# Patient Record
Sex: FEMALE | Race: WHITE | Employment: OTHER | URBAN - METROPOLITAN AREA
[De-identification: names, ages, dates, MRNs, and addresses within clinical notes are randomized per-mention and may not be internally consistent; named-entity substitution may affect disease eponyms.]

---

## 2017-01-17 ENCOUNTER — ALLSCRIPTS OFFICE VISIT (OUTPATIENT)
Dept: OTHER | Facility: OTHER | Age: 77
End: 2017-01-17

## 2017-01-17 ENCOUNTER — LAB REQUISITION (OUTPATIENT)
Dept: LAB | Facility: HOSPITAL | Age: 77
End: 2017-01-17
Payer: COMMERCIAL

## 2017-01-17 DIAGNOSIS — N39.0 URINARY TRACT INFECTION: ICD-10-CM

## 2017-01-17 DIAGNOSIS — R10.13 EPIGASTRIC PAIN: ICD-10-CM

## 2017-01-17 LAB
BILIRUB UR QL STRIP: NEGATIVE
BILIRUB UR QL STRIP: NORMAL
CLARITY UR: CLEAR
CLARITY UR: NORMAL
COLOR UR: YELLOW
COLOR UR: YELLOW
GLUCOSE (HISTORICAL): NORMAL
GLUCOSE UR STRIP-MCNC: NEGATIVE MG/DL
HGB UR QL STRIP.AUTO: NEGATIVE
HGB UR QL STRIP.AUTO: NORMAL
KETONES UR STRIP-MCNC: NEGATIVE MG/DL
KETONES UR STRIP-MCNC: NORMAL MG/DL
LEUKOCYTE ESTERASE UR QL STRIP: 1
LEUKOCYTE ESTERASE UR QL STRIP: NEGATIVE
NITRITE UR QL STRIP: NEGATIVE
NITRITE UR QL STRIP: NORMAL
PH UR STRIP.AUTO: 6 [PH]
PH UR STRIP.AUTO: 6 [PH] (ref 4.5–8)
PROT UR STRIP-MCNC: NEGATIVE MG/DL
PROT UR STRIP-MCNC: NORMAL MG/DL
SP GR UR STRIP.AUTO: 1.01
SP GR UR STRIP.AUTO: 1.01 (ref 1–1.03)
UROBILINOGEN UR QL STRIP.AUTO: 0.2 E.U./DL
UROBILINOGEN UR QL STRIP.AUTO: 3.5

## 2017-01-17 PROCEDURE — 81003 URINALYSIS AUTO W/O SCOPE: CPT | Performed by: NURSE PRACTITIONER

## 2017-01-18 ENCOUNTER — GENERIC CONVERSION - ENCOUNTER (OUTPATIENT)
Dept: OTHER | Facility: OTHER | Age: 77
End: 2017-01-18

## 2017-01-19 ENCOUNTER — APPOINTMENT (OUTPATIENT)
Dept: LAB | Age: 77
End: 2017-01-19
Payer: COMMERCIAL

## 2017-01-19 ENCOUNTER — TRANSCRIBE ORDERS (OUTPATIENT)
Dept: ADMINISTRATIVE | Age: 77
End: 2017-01-19

## 2017-01-19 DIAGNOSIS — R10.13 EPIGASTRIC PAIN: ICD-10-CM

## 2017-01-19 PROCEDURE — 87338 HPYLORI STOOL AG IA: CPT

## 2017-01-20 LAB — H PYLORI AG STL QL IA: NEGATIVE

## 2017-01-22 ENCOUNTER — GENERIC CONVERSION - ENCOUNTER (OUTPATIENT)
Dept: OTHER | Facility: OTHER | Age: 77
End: 2017-01-22

## 2017-02-17 ENCOUNTER — ALLSCRIPTS OFFICE VISIT (OUTPATIENT)
Dept: OTHER | Facility: OTHER | Age: 77
End: 2017-02-17

## 2017-02-27 ENCOUNTER — ALLSCRIPTS OFFICE VISIT (OUTPATIENT)
Dept: OTHER | Facility: OTHER | Age: 77
End: 2017-02-27

## 2017-05-01 ENCOUNTER — ANESTHESIA EVENT (OUTPATIENT)
Dept: GASTROENTEROLOGY | Facility: HOSPITAL | Age: 77
End: 2017-05-01
Payer: COMMERCIAL

## 2017-05-01 ENCOUNTER — GENERIC CONVERSION - ENCOUNTER (OUTPATIENT)
Dept: OTHER | Facility: OTHER | Age: 77
End: 2017-05-01

## 2017-05-01 ENCOUNTER — ANESTHESIA (OUTPATIENT)
Dept: GASTROENTEROLOGY | Facility: HOSPITAL | Age: 77
End: 2017-05-01
Payer: COMMERCIAL

## 2017-05-01 ENCOUNTER — HOSPITAL ENCOUNTER (OUTPATIENT)
Facility: HOSPITAL | Age: 77
Setting detail: OUTPATIENT SURGERY
Discharge: HOME | End: 2017-05-01
Attending: INTERNAL MEDICINE | Admitting: INTERNAL MEDICINE
Payer: COMMERCIAL

## 2017-05-01 VITALS
RESPIRATION RATE: 16 BRPM | DIASTOLIC BLOOD PRESSURE: 53 MMHG | HEART RATE: 55 BPM | OXYGEN SATURATION: 98 % | HEIGHT: 63 IN | SYSTOLIC BLOOD PRESSURE: 106 MMHG | WEIGHT: 116.84 LBS | BODY MASS INDEX: 20.7 KG/M2 | TEMPERATURE: 97.1 F

## 2017-05-01 DIAGNOSIS — Z12.11 ENCOUNTER FOR SCREENING FOR MALIGNANT NEOPLASM OF COLON: ICD-10-CM

## 2017-05-01 DIAGNOSIS — K21.9 ACID REFLUX DISEASE: ICD-10-CM

## 2017-05-01 PROCEDURE — 88342 IMHCHEM/IMCYTCHM 1ST ANTB: CPT | Performed by: INTERNAL MEDICINE

## 2017-05-01 PROCEDURE — 88305 TISSUE EXAM BY PATHOLOGIST: CPT | Performed by: INTERNAL MEDICINE

## 2017-05-01 RX ORDER — LOSARTAN POTASSIUM 50 MG/1
50 TABLET ORAL DAILY
COMMUNITY
End: 2018-07-24 | Stop reason: SDUPTHER

## 2017-05-01 RX ORDER — SODIUM CHLORIDE, SODIUM LACTATE, POTASSIUM CHLORIDE, CALCIUM CHLORIDE 600; 310; 30; 20 MG/100ML; MG/100ML; MG/100ML; MG/100ML
125 INJECTION, SOLUTION INTRAVENOUS CONTINUOUS
Status: DISCONTINUED | OUTPATIENT
Start: 2017-05-01 | End: 2017-05-01 | Stop reason: HOSPADM

## 2017-05-01 RX ORDER — PROPOFOL 10 MG/ML
INJECTION, EMULSION INTRAVENOUS AS NEEDED
Status: DISCONTINUED | OUTPATIENT
Start: 2017-05-01 | End: 2017-05-01 | Stop reason: SURG

## 2017-05-01 RX ORDER — OMEPRAZOLE 40 MG/1
40 CAPSULE, DELAYED RELEASE ORAL DAILY
COMMUNITY
End: 2018-07-24 | Stop reason: SDUPTHER

## 2017-05-01 RX ADMIN — PROPOFOL 100 MG: 10 INJECTION, EMULSION INTRAVENOUS at 14:06

## 2017-05-01 RX ADMIN — PROPOFOL 50 MG: 10 INJECTION, EMULSION INTRAVENOUS at 14:19

## 2017-05-01 RX ADMIN — PROPOFOL 100 MG: 10 INJECTION, EMULSION INTRAVENOUS at 14:00

## 2017-05-01 RX ADMIN — SODIUM CHLORIDE, SODIUM LACTATE, POTASSIUM CHLORIDE, AND CALCIUM CHLORIDE 125 ML/HR: .6; .31; .03; .02 INJECTION, SOLUTION INTRAVENOUS at 12:18

## 2017-05-01 RX ADMIN — PROPOFOL 50 MG: 10 INJECTION, EMULSION INTRAVENOUS at 14:12

## 2017-05-01 RX ADMIN — PROPOFOL 100 MG: 10 INJECTION, EMULSION INTRAVENOUS at 14:03

## 2017-05-25 ENCOUNTER — GENERIC CONVERSION - ENCOUNTER (OUTPATIENT)
Dept: OTHER | Facility: OTHER | Age: 77
End: 2017-05-25

## 2017-09-14 DIAGNOSIS — Z12.31 ENCOUNTER FOR SCREENING MAMMOGRAM FOR MALIGNANT NEOPLASM OF BREAST: ICD-10-CM

## 2017-10-05 ENCOUNTER — HOSPITAL ENCOUNTER (OUTPATIENT)
Dept: RADIOLOGY | Age: 77
Discharge: HOME | End: 2017-10-05
Payer: COMMERCIAL

## 2017-10-05 DIAGNOSIS — Z12.31 ENCOUNTER FOR SCREENING MAMMOGRAM FOR MALIGNANT NEOPLASM OF BREAST: ICD-10-CM

## 2017-10-05 PROCEDURE — G0202 SCR MAMMO BI INCL CAD: HCPCS

## 2017-10-07 ENCOUNTER — GENERIC CONVERSION - ENCOUNTER (OUTPATIENT)
Dept: OTHER | Facility: OTHER | Age: 77
End: 2017-10-07

## 2017-11-08 ENCOUNTER — HOSPITAL ENCOUNTER (OUTPATIENT)
Dept: RADIOLOGY | Age: 77
Discharge: HOME | End: 2017-11-08
Payer: COMMERCIAL

## 2017-11-08 ENCOUNTER — TRANSCRIBE ORDERS (OUTPATIENT)
Dept: ADMINISTRATIVE | Age: 77
End: 2017-11-08

## 2017-11-08 ENCOUNTER — APPOINTMENT (OUTPATIENT)
Dept: LAB | Age: 77
End: 2017-11-08
Payer: COMMERCIAL

## 2017-11-08 DIAGNOSIS — E04.1 NONTOXIC SINGLE THYROID NODULE: ICD-10-CM

## 2017-11-08 DIAGNOSIS — E78.5 HYPERLIPIDEMIA: ICD-10-CM

## 2017-11-08 DIAGNOSIS — I10 ESSENTIAL (PRIMARY) HYPERTENSION: ICD-10-CM

## 2017-11-08 LAB
ALBUMIN SERPL BCP-MCNC: 3.5 G/DL (ref 3.5–5)
ALP SERPL-CCNC: 102 U/L (ref 46–116)
ALT SERPL W P-5'-P-CCNC: 18 U/L (ref 12–78)
ANION GAP SERPL CALCULATED.3IONS-SCNC: 3 MMOL/L (ref 4–13)
AST SERPL W P-5'-P-CCNC: 15 U/L (ref 5–45)
BASOPHILS # BLD AUTO: 0.02 THOUSANDS/ΜL (ref 0–0.1)
BASOPHILS NFR BLD AUTO: 0 % (ref 0–1)
BILIRUB SERPL-MCNC: 0.34 MG/DL (ref 0.2–1)
BUN SERPL-MCNC: 16 MG/DL (ref 5–25)
CALCIUM SERPL-MCNC: 9.1 MG/DL (ref 8.3–10.1)
CHLORIDE SERPL-SCNC: 105 MMOL/L (ref 100–108)
CHOLEST SERPL-MCNC: 218 MG/DL (ref 50–200)
CO2 SERPL-SCNC: 29 MMOL/L (ref 21–32)
CREAT SERPL-MCNC: 0.75 MG/DL (ref 0.6–1.3)
EOSINOPHIL # BLD AUTO: 0.05 THOUSAND/ΜL (ref 0–0.61)
EOSINOPHIL NFR BLD AUTO: 1 % (ref 0–6)
ERYTHROCYTE [DISTWIDTH] IN BLOOD BY AUTOMATED COUNT: 13.2 % (ref 11.6–15.1)
GFR SERPL CREATININE-BSD FRML MDRD: 79 ML/MIN/1.73SQ M
GLUCOSE P FAST SERPL-MCNC: 81 MG/DL (ref 65–99)
HCT VFR BLD AUTO: 43.1 % (ref 34.8–46.1)
HDLC SERPL-MCNC: 74 MG/DL (ref 40–60)
HGB BLD-MCNC: 14.3 G/DL (ref 11.5–15.4)
LDLC SERPL CALC-MCNC: 124 MG/DL (ref 0–100)
LYMPHOCYTES # BLD AUTO: 0.75 THOUSANDS/ΜL (ref 0.6–4.47)
LYMPHOCYTES NFR BLD AUTO: 9 % (ref 14–44)
MCH RBC QN AUTO: 32.1 PG (ref 26.8–34.3)
MCHC RBC AUTO-ENTMCNC: 33.2 G/DL (ref 31.4–37.4)
MCV RBC AUTO: 97 FL (ref 82–98)
MONOCYTES # BLD AUTO: 0.38 THOUSAND/ΜL (ref 0.17–1.22)
MONOCYTES NFR BLD AUTO: 5 % (ref 4–12)
NEUTROPHILS # BLD AUTO: 6.93 THOUSANDS/ΜL (ref 1.85–7.62)
NEUTS SEG NFR BLD AUTO: 85 % (ref 43–75)
NRBC BLD AUTO-RTO: 0 /100 WBCS
PLATELET # BLD AUTO: 287 THOUSANDS/UL (ref 149–390)
PMV BLD AUTO: 10.2 FL (ref 8.9–12.7)
POTASSIUM SERPL-SCNC: 4.5 MMOL/L (ref 3.5–5.3)
PROT SERPL-MCNC: 7.1 G/DL (ref 6.4–8.2)
RBC # BLD AUTO: 4.45 MILLION/UL (ref 3.81–5.12)
SODIUM SERPL-SCNC: 137 MMOL/L (ref 136–145)
TRIGL SERPL-MCNC: 98 MG/DL
TSH SERPL DL<=0.05 MIU/L-ACNC: 1.26 UIU/ML (ref 0.36–3.74)
WBC # BLD AUTO: 8.14 THOUSAND/UL (ref 4.31–10.16)

## 2017-11-08 PROCEDURE — 80061 LIPID PANEL: CPT

## 2017-11-08 PROCEDURE — 36415 COLL VENOUS BLD VENIPUNCTURE: CPT

## 2017-11-08 PROCEDURE — 84443 ASSAY THYROID STIM HORMONE: CPT

## 2017-11-08 PROCEDURE — 85025 COMPLETE CBC W/AUTO DIFF WBC: CPT

## 2017-11-08 PROCEDURE — 76536 US EXAM OF HEAD AND NECK: CPT

## 2017-11-08 PROCEDURE — 80053 COMPREHEN METABOLIC PANEL: CPT

## 2017-11-09 ENCOUNTER — GENERIC CONVERSION - ENCOUNTER (OUTPATIENT)
Dept: OTHER | Facility: OTHER | Age: 77
End: 2017-11-09

## 2017-11-13 ENCOUNTER — GENERIC CONVERSION - ENCOUNTER (OUTPATIENT)
Dept: OTHER | Facility: OTHER | Age: 77
End: 2017-11-13

## 2017-11-21 ENCOUNTER — ALLSCRIPTS OFFICE VISIT (OUTPATIENT)
Dept: OTHER | Facility: OTHER | Age: 77
End: 2017-11-21

## 2018-01-11 NOTE — PROGRESS NOTES
Assessment    1  Encounter for preventive health examination (V70 0) (Z00 00)   2  Need for hepatitis C screening test (V73 89) (Z11 59)   3  Abnormal blood chemistry (790 6) (R79 9)    Plan  Abnormal blood chemistry    · * DXA BONE DENSITY SPINE HIP AND PELVIS; Status:Hold For - Scheduling;  Requested for:21Nov2017; Abnormal blood chemistry, Acid reflux disease, Benign essential hypertension,  Hemorrhoids, Hyperlipidemia, Long term use of drug, Need for hepatitis C  screening test, Thyroid nodule, Vitamin D deficiency    · (1) HEP C ANTIBODY; Status:Active; Requested for:21Jul2018; Abnormal blood chemistry, Acid reflux disease, Benign essential hypertension,  Hemorrhoids, Hyperlipidemia, Long term use of drug, Thyroid nodule, Vitamin D  deficiency    · (1) COMPREHENSIVE METABOLIC PANEL; Status:Active; Requested for:21Jul2018;    · (1) LIPID PANEL FASTING W DIRECT LDL REFLEX; Status:Active; Requested  for:21Jul2018;    · (1) TSH WITH FT4 REFLEX; Status:Active; Requested for:21Jul2018; Abnormal blood chemistry, Need for hepatitis C screening test    · (1) CBC/PLT/DIFF; Status:Active; Requested for:21Jul2018;   Screening for genitourinary condition    · *VB - Urinary Incontinence Screen (Dx Z13 89 Screen for UI); Status:Complete -  Retrospective By Protocol Authorization;   Done: 58MFN3259 10:16AM    Discussion/Summary    DEXA  LABS  F/U 6 MONTHS  ONE YEAR MEDICARE WELLNESS  Impression: Subsequent Annual Wellness Visit, with preventive exam as well as age and risk appropriate counseling completed  Cardiovascular screening and counseling: the risks and benefits of screening were discussed and counseling was given on maintaining a healthy diet  Diabetes screening and counseling: the risks and benefits of screening were discussed and counseling was given on maintaining a healthy diet  Colorectal cancer screening and counseling: the risks and benefits of screening were discussed and screening is current  Breast cancer screening and counseling: the risks and benefits of screening were discussed and screening is current  Cervical cancer screening and counseling: the patient declines screening  Osteoporosis screening and counseling: due for DXA axial skeleton  Abdominal aortic aneurysm screening and counseling: screening not indicated  Glaucoma screening and counseling: the risks and benefits of screening were discussed and screening is current  HIV screening and counseling: screening not indicated  Immunizations: influenza vaccine is up to date this year, the patient declines the pneumococcal vaccination, hepatitis B vaccination series is not indicated at this time due to the patient's low risk of andree the disease, Zostavax vaccination up to date, the patient declines the Td vaccine and the patient declines the Tdap vaccine  Advance Directive Planning: not complete, paperwork and instructions were given to the patient  Patient Discussion: plan discussed with the patient, follow-up visit needed in one year  Chief Complaint  Patient presents to office today for a Medicare wellness  History of Present Illness  HPI: here for medicare wellnss   Welcome to Medicare and Wellness Visits: The patient is being seen for the subsequent annual wellness visit  Medicare Screening and Risk Factors   Hospitalizations: no previous hospitalizations  Medicare Screening Tests Risk Questions   Abdominal aortic aneurysm risk assessment: none indicated  Osteoporosis risk assessment: none indicated  HIV risk assessment: none indicated  Drug and Alcohol Use: The patient is a current cigarette smoker and currently smokes 3/4ppd x55 years cigarettes per day  She is ready to quit using tobacco and SCARED TO TRY CHANTIX, FAILED WITH THE PATCH  The patient reports occasional alcohol use  Alcohol concern:   The patient has no concerns about alcohol abuse  She has never used illicit drugs     Diet and Physical Activity: Current diet includes well balanced meals, low salt food choices and 1 1/2 cups of coffee per day  The patient does not exercise  Mood Disorder and Cognitive Impairment Screening: She denies feeling down, depressed, or hopeless over the past two weeks  She denies feeling little interest or pleasure in doing things over the past two weeks  Cognitive impairment screening: denies difficulty learning/retaining new information, denies difficulty handling complex tasks, denies difficulty with reasoning, denies difficulty with spatial ability and orientation, denies difficulty with language and denies difficulty with behavior  Functional Ability/Level of Safety: Hearing is normal bilaterally  She denies hearing difficulties  She does not use a hearing aid  The patient is currently able to do activities of daily living without limitations, able to do instrumental activities of daily living without limitations, able to participate in social activities without limitations and able to drive without limitations  Activities of daily living details: does not need help using the phone, no transportation help needed, does not need help shopping, no meal preparation help needed, does not need help doing housework, does not need help doing laundry, does not need help managing medications and does not need help managing money  Injury History: no polypharmacy, no alcohol use, no mobility impairment, no antidepressant use, no deconditioning, no postural hypotension, no sedative use, no visual impairment, no urinary incontinence, antihypertensive use, no cognitive impairment, up and go test was normal and no previous fall  Home safety risk factors:  no grab bars in the bathroom, but no unfamiliar surroundings, no loose rugs, no poor household lighting, no uneven floors, no household clutter and handrails on the stairs     Advance Directives: Advance directives: no living will, no durable power of  for health care directives and no advance directives  Co-Managers and Medical Equipment/Suppliers: See Patient Care Team   Reviewed Updated José Barr:   Last Medicare Wellness Visit Information was reviewed, patient interviewed, no change since last AWV  Preventive Quality Program 65 and Older: Falls Risk: The patient fell 0 times in the past 12 months  The patient currently has no urinary incontinence symptoms  Patient Care Team    Care Team Member Role Specialty Office Number   [facility] Specialist Certified Clinical Nurse Specialist [phone]   Connor Ovalles MD Specialist Gastroenterology Adult [phone]   Isaiah Owen MD Specialist Optometry [phone]   [facility] Specialist Neurosurgery [phone]   Becca GERONIMO  Specialist Neurosurgery [phone]   Calvin GERONIMO  Specialist Gastroenterology Adult [phone]     Review of Systems    Constitutional: negative  Eyes: negative  ENT: negative  Cardiovascular: negative  Respiratory: negative  Gastrointestinal: negative  Genitourinary: negative  Musculoskeletal: negative  Integumentary and Breasts: negative  Neurological: negative  Psychiatric: negative  Endocrine: negative  Hematologic and Lymphatic: negative  Active Problems    1  Abnormal blood chemistry (790 6) (R79 9)   2  Acid indigestion (536 8) (K30)   3  Acid reflux disease (530 81) (K21 9)   4  Benign essential hypertension (401 1) (I10)   5  Cervicalgia (723 1) (M54 2)   6  Encounter for screening mammogram for malignant neoplasm of breast (V76 12)   (Z12 31)   7  Family history of colon cancer (V16 0) (Z80 0)   8  Hemorrhoids (455 6) (K64 9)   9  Hyperlipidemia (272 4) (E78 5)   10  Long term use of drug (V58 69) (Z79 899)   11  Midepigastric pain (789 06) (R10 13)   12  Screening for colon cancer (V76 51) (Z12 11)   13  Spondylosis of cervical region without myelopathy or radiculopathy (721 0) (M47 812)   14   Thyroid nodule (241 0) (E04 1)   15  Vitamin D deficiency (268 9) (E55 9)    Past Medical History    · History of Acute upper respiratory infection (465 9) (J06 9)   · History of Encounter for screening mammogram for malignant neoplasm of breast  (V76 12) (Z12 31)   · History of urinary tract infection (V13 02) (Z87 440)   · History of Trapezius muscle spasm (728 85) (W51 373)    The active problems and past medical history were reviewed and updated today  Surgical History    · History of Fasciotomy Of The Lateral Epicondyle Of The Elbow   · History of Hysterectomy   · History of Total Abdominal Hysterectomy With Removal Of Both Ovaries    The surgical history was reviewed and updated today  Family History  Mother    · Family history of Melanoma  Father    · Family history of Cirrhosis, alcoholic   · Family history of CVA (cerebral vascular accident)   · Family history of Pacemaker Placement  Sister    · Family history of Colon cancer   · Family history of Diabetes    The family history was reviewed and updated today  Social History    · Consumes alcohol occasionally (V49 89) (Z78 9)   · Former smoker (B90 80) (H76 700)   · Quit June 2014 - 1/2 ppd for 40 years  · Retired   · Two children  The social history was reviewed and updated today  The social history was reviewed and is unchanged  Current Meds   1  Losartan Potassium 50 MG Oral Tablet; TAKE 1 TABLET DAILY; Therapy: 90QSE9632 to ([facility])  Requested for: 40TYW0923; Last   Rx:10Nov2017 Ordered   2  Omeprazole 40 MG Oral Capsule Delayed Release; take 1 capsule daily; Therapy: 50ODX7247 to ([facility])  Requested for: 19EAQ0247; Last   Rx:10Nov2017 Ordered   3  Vitamin D 1000 UNIT CAPS; take 2 tablet daily; Therapy: 88DRP9655 to Recorded    The medication list was reviewed and updated today  Allergies    1  Atorvastatin Calcium TABS    2  No Known Environmental Allergies   3   No Known Food Allergies    Immunizations   ** Printed in Appendix #1 below  Vitals  Signs    Heart Rate: 79  Systolic: 129  Diastolic: 74  Height: 5 ft 2 in  Weight: 128 lb 6 oz  BMI Calculated: 23 48  BSA Calculated: 1 58    Physical Exam    Constitutional   General appearance: No acute distress, well appearing and well nourished  Head and Face   Head and face: Normal     Eyes   Conjunctiva and lids: No swelling, erythema or discharge  Pupils and irises: Equal, round, reactive to light  Ears, Nose, Mouth, and Throat   External inspection of ears and nose: Normal     Otoscopic examination: Tympanic membranes translucent with normal light reflex  Canals patent without erythema  Nasal mucosa, septum, and turbinates: Normal without edema or erythema  Lips, teeth, and gums: Normal, good dentition  Oropharynx: Normal with no erythema, edema, exudate or lesions  Neck   Neck: Supple, symmetric, trachea midline, no masses  Thyroid: Normal, no thyromegaly  Pulmonary   Respiratory effort: No increased work of breathing or signs of respiratory distress  Percussion of chest: Normal     Palpation of chest: Normal     Auscultation of lungs: Clear to auscultation  Cardiovascular   Auscultation of heart: Normal rate and rhythm, normal S1 and S2, no murmurs  Examination of extremities for edema and/or varicosities: Normal     Abdomen   Abdomen: Non-tender, no masses  Liver and spleen: No hepatomegaly or splenomegaly  Lymphatic   Palpation of lymph nodes in neck: No lymphadenopathy  Palpation of lymph nodes in axillae: No lymphadenopathy  Musculoskeletal   Gait and station: Normal     Digits and nails: Normal without clubbing or cyanosis  Joints, bones, and muscles: Normal     Range of motion: Normal     Stability: Normal     Muscle strength/tone: Normal     Skin   Skin and subcutaneous tissue: Normal without rashes or lesions  Palpation of skin and subcutaneous tissue: Normal turgor  Neurologic   Cranial nerves: Cranial nerves II-XII intact  Reflexes: 2+ and symmetric  Sensation: No sensory loss  Psychiatric   Judgment and insight: Normal     Orientation to person, place, and time: Normal     Recent and remote memory: Intact  Mood and affect: Normal        Results/Data  *VB - Urinary Incontinence Screen (Dx Z13 89 Screen for UI)  10:16AM Kori Rueda     Test Name Result Flag Reference   Urinary Incontinence Assessment [id number]       PHQ-2 Adult Depression Screening 2017 10:15AM User, s     Test Name Result Flag Reference   PHQ-2 Adult Depression Score 0     Over the last two weeks, how often have you been bothered by any of the following problems? Little interest or pleasure in doing things: Not at all - 0  Feeling down, depressed, or hopeless: Not at all - 0   PHQ-2 Adult Depression Screening Negative         Health Management  Acid reflux disease   EGD; every 1 year; Last 53JLX2382; Next Due: 12HLS9026; Active  Family history of colon cancer   COLONOSCOPY (GI, SURG); every 3 years; Last 00LYD1917; Next Due: 69WPW3989; Active  Health Maintenance   Digital Bilateral Screening Mammogram With CAD; every 1 year; Last 46ACA1433; Next  Due: 10Gim4326; Overdue  Medicare Annual Wellness Visit; every 1 year; Next Due: 97YVD4659; Overdue  U/S Thyroid; every 1 year; Last 50HJA3706; Next Due: 87Wta6401;  Overdue    Future Appointments    Date/Time Provider Specialty Site   2018 12:00 PM Kori Rueda [address]  [facility]     Signatures   Electronically signed by : IRAM Dodson; 2017 10:52AM EST                       (Author)    Electronically signed by : MIRTA García ; 2017 11:14AM EST                       (Review)    Appendix #1     Patient: Donnell Segundo ; : 1940; MRN: [id number]      1 2 3 4    Influenza  08-Oct-2013 10-Oct-2014 13-Oct-2015 25-Oct-2016    PCV  Temporarily Deferred: Pt refuses, Fall 2015       PPSV  12-Oct-2010

## 2018-01-12 NOTE — RESULT NOTES
Message   #1  Please call the patient with the results of her mammogram       #2  The radiologist states that her mammogram looks well and recommends repeating another one in 1 year, unless she feels something sooner  #3  She can  an order slip for next year's mammogram at her office visit with Marline Arita later this month  #4  You may leave a message, if her communication consent allows for it  Verified Results  * MAMMO SCREENING BILATERAL W CAD 68AUF7751 09:27AM Rafat Fragmin     Test Name Result Flag Reference   MAMMO SCREENING BILATERAL W CAD (Report)     Patient History:   Patient is postmenopausal    Family history of colorectal cancer in sister at age 71  Cyst aspiration of the right breast, September 8, 2010  Took hormonal contraceptives for 10 years  Took estrogen for 5    years  Patient is a former smoker, and smoked for 45 years  Patient's    BMI is 23 9  Reason for exam: screening (asymptomatic)  Mammo Screening Bilateral W CAD: September 29, 2016 - Check In #:   [de-identified]   Bilateral CC and MLO view(s) were taken  Technologist: Codie Handy, RT(R)(M)   Prior study comparison: September 24, 2015, digital bilateral    screening mammogram performed at [facility]  September 18, 2014, digital bilateral screening mammogram    performed at [facility]  September 12, 2013,    digital bilateral screening mammogram performed at [address] [place] [address]  September 6, 2012, digital bilateral screening   mammogram performed at [facility]  September 1, 2011, digital bilateral screening mammogram performed at [address] [place] [address]  There are scattered fibroglandular densities  The parenchymal pattern appears stable  No dominant soft tissue    mass or suspicious calcifications are noted  The skin and nipple   contours are within normal limits  No mammographic evidence of malignancy   No    significant changes when compared with prior studies  ASSESSMENT: BiRad:1 - Negative     Recommendation:   Routine screening mammogram in 1 year  A reminder letter will be   scheduled  Analyzed by CAD     8-10% of cancers will be missed on mammography  Management of a    palpable abnormality must be based on clinical grounds  Patients   will be notified of their results via letter from our facility  Accredited by [employer] of Radiology and FDA       Transcription Location: GARRY Vincent 98: [id number]     Risk Value(s):   Tyrer-Cuzick 10 Year: 0 937%, Tyrer-Cuzick Lifetime: 1 156%,    Myriad Table: 1 5%, XIOMY 5 Year: 1 3%, NCI Lifetime: 3 1%   Signed by:   Marci Gurrola MD   9/29/16

## 2018-01-13 VITALS
DIASTOLIC BLOOD PRESSURE: 74 MMHG | SYSTOLIC BLOOD PRESSURE: 124 MMHG | HEIGHT: 62 IN | BODY MASS INDEX: 23.62 KG/M2 | WEIGHT: 128.38 LBS | HEART RATE: 79 BPM

## 2018-01-13 VITALS
HEIGHT: 62 IN | SYSTOLIC BLOOD PRESSURE: 130 MMHG | HEART RATE: 96 BPM | BODY MASS INDEX: 23.93 KG/M2 | OXYGEN SATURATION: 95 % | WEIGHT: 130.04 LBS | RESPIRATION RATE: 16 BRPM | TEMPERATURE: 98.1 F | DIASTOLIC BLOOD PRESSURE: 62 MMHG

## 2018-01-13 VITALS
DIASTOLIC BLOOD PRESSURE: 76 MMHG | BODY MASS INDEX: 25.39 KG/M2 | SYSTOLIC BLOOD PRESSURE: 114 MMHG | HEART RATE: 95 BPM | WEIGHT: 130 LBS | TEMPERATURE: 96.5 F | OXYGEN SATURATION: 96 %

## 2018-01-13 NOTE — RESULT NOTES
Message   #1  Please call the patient with the results of her laboratory testing  #2  Her vitamin D level is normal       #3  Her cholesterol level is slightly high, but stable  #4  Otherwise, her laboratory testing looks well  #5  I recommend that she continue with her current supplements until her office visit later this month  #6  You may leave a message, if her communication consent allows for it  Verified Results  (1) COMPREHENSIVE METABOLIC PANEL 24FJV0122 66:63ZH [place]     Test Name Result Flag Reference   GLUCOSE 85 mg/dL  65-99   Fasting reference interval   UREA NITROGEN (BUN) 10 mg/dL  7-25   CREATININE 0 72 mg/dL  0 60-0 93   For patients >52years of age, the reference limit  for Creatinine is approximately 13% higher for people  identified as -American  eGFR NON-AFR  AMERICAN 83 mL/min/1 73m2  > OR = 60   eGFR AFRICAN AMERICAN 96 mL/min/1 73m2  > OR = 60   BUN/CREATININE RATIO   0-62   NOT APPLICABLE (calc)   SODIUM 141 mmol/L  135-146   POTASSIUM 4 6 mmol/L  3 5-5 3   CHLORIDE 104 mmol/L     CARBON DIOXIDE 28 mmol/L  20-31   CALCIUM 10 0 mg/dL  8 6-10 4   PROTEIN, TOTAL 7 0 g/dL  6 1-8 1   ALBUMIN 4 4 g/dL  3 6-5 1   GLOBULIN 2 6 g/dL (calc)  1 9-3 7   ALBUMIN/GLOBULIN RATIO 1 7 (calc)  1 0-2 5   BILIRUBIN, TOTAL 0 4 mg/dL  0 2-1 2   ALKALINE PHOSPHATASE 94 U/L     AST 17 U/L  10-35   ALT 13 U/L  6-29     (Q) LIPID PANEL WITH REFLEX TO DIRECT LDL 11Oct2016 08:53AM [place]     Test Name Result Flag Reference   CHOLESTEROL, TOTAL 251 mg/dL H 125-200   HDL CHOLESTEROL 80 mg/dL  > OR = 46   TRIGLICERIDES 93 mg/dL  <940   LDL-CHOLESTEROL 152 mg/dL (calc) H <130   Desirable range <100 mg/dL for patients with CHD or  diabetes and <70 mg/dL for diabetic patients with  known heart disease     CHOL/HDLC RATIO 3 1 (calc)  < OR = 5 0   NON HDL CHOLESTEROL 171 mg/dL (calc) H    Target for non-HDL cholesterol is 30 mg/dL higher than   LDL cholesterol target  *(Q) VITAMIN D, 25-HYDROXY, LC/MS/MS 11Oct2016 08:53AM Trey Ann     Test Name Result Flag Reference   VITAMIN D, 25-OH, TOTAL 42 ng/mL     Vitamin D Status         25-OH Vitamin D:     Deficiency:                    <20 ng/mL  Insufficiency:             20 - 29 ng/mL  Optimal:                 > or = 30 ng/mL     For 25-OH Vitamin D testing on patients on   D2-supplementation and patients for whom quantitation   of D2 and D3 fractions is required, the QuestAssureD(TM)  25-OH VIT D, (D2,D3), LC/MS/MS is recommended: order   code 84967 (patients >2yrs)  For more information on this test, go to:  http://[employer]/faq/LWL897  (This link is being provided for   informational/educational purposes only )     (Q) TSH, 3RD GENERATION W/REFLEX TO FT4 11Oct2016 08:53AM Trey Ann   REPORT COMMENT:  FASTING:YES     Test Name Result Flag Reference   TSH W/REFLEX TO FT4 1 24 mIU/L  0 40-4 50

## 2018-01-14 VITALS
WEIGHT: 134.05 LBS | RESPIRATION RATE: 16 BRPM | TEMPERATURE: 98.6 F | HEART RATE: 103 BPM | HEIGHT: 62 IN | SYSTOLIC BLOOD PRESSURE: 160 MMHG | DIASTOLIC BLOOD PRESSURE: 70 MMHG | OXYGEN SATURATION: 98 % | BODY MASS INDEX: 24.67 KG/M2

## 2018-01-14 NOTE — PROGRESS NOTES
Chief Complaint  The patient presented today for a BP check (138/80) confirmed with Abigail Holman and she advised the patient to stay on valsartan 160mg once a day  Active Problems    1  Abnormal blood chemistry (790 6) (R79 9)   2  Acid indigestion (536 8) (K30)   3  Acute upper respiratory infection (465 9) (J06 9)   4  Benign essential hypertension (401 1) (I10)   5  Cervicalgia (723 1) (M54 2)   6  Encounter for screening mammogram for malignant neoplasm of breast (V76 12)   (Z12 31)   7  Hyperlipidemia (272 4) (E78 5)   8  Long term use of drug (V58 69) (Z79 899)   9  Need for prophylactic vaccination against single diseases (V05 9) (Z23)   10  Need for prophylactic vaccination against Streptococcus pneumoniae (pneumococcus)    (V03 82) (Z23)   11  Need for prophylactic vaccination and inoculation against influenza (V04 81) (Z23)   12  Screening for colon cancer (V76 51) (Z12 11)   13  Screening for genitourinary condition (V81 6) (Z13 89)   14  Spondylosis of cervical region without myelopathy or radiculopathy (721 0) (M47 812)   15  Thyroid nodule (241 0) (E04 1)   16  Trapezius muscle spasm (728 85) (M62 838)   17  Vitamin D deficiency (268 9) (E55 9)    Current Meds   1  Azithromycin 250 MG Oral Tablet; TAKE 2 TABLETS ON DAY 1 THEN TAKE 1 TABLET A   DAY FOR 4 DAYS; Therapy: 07CLW4868 to (Prasanna Chan)  Requested for: 86EOM8436; Last   Rx:02Nov2016 Ordered   2  RaNITidine HCl - 150 MG Oral Tablet; TAKE 1 TABLET TWICE DAILY; Therapy: 57NIP7675 to (Sia Nguyễn)  Requested for: 24HZH5377; Last   Rx:02Nov2016 Ordered   3  Valsartan 160 MG Oral Tablet; take 1 tablet by mouth every day; Therapy: 94QEY4486 to (Last Rx:29Nov2016)  Requested for: 15YKK1409 Ordered   4  Vitamin D 1000 UNIT CAPS; take 2 tablet daily; Therapy: 42YYP9006 to Recorded    Allergies    1  Atorvastatin Calcium TABS    2  No Known Environmental Allergies   3   No Known Food Allergies    Vitals  Signs    Systolic: 362  Diastolic: 80    Future Appointments    Date/Time Provider Specialty Site   10/26/2017 09:00 AM Pedro Caal [address]  [facility]     Signatures   Electronically signed by : IRAM Daniels; Dec 23 2016  6:29AM EST                       (Author)    Electronically signed by : Ibis Sharpe DO; Dec 23 2016  3:05PM EST                       (Author)

## 2018-01-14 NOTE — RESULT NOTES
Discussion/Summary   egd: shows barretts- repeat in 1 year  continue ppi daily   colon: 1 precancerous polyp- repeat in 3 years    1 precancerous polyp- has Fhx of colon ca, repeat in 3 years     Verified Results  COLONOSCOPY [id number] 02:02PM John Syed     Test Name Result Flag Reference   Colonoscopy 05/01/2017        Summary / No summary entered :      No summary entered   Documents attached :      EPIC OP NOTE - John Syed; [place]: [id number] - Appointment -      John Humphreyo - (Gastroenterology Adult) (Additional Information      Document)  (1) TISSUE EXAM [id number] 02:00PM John Syed     Test Name Result Flag Reference   LAB AP CASE REPORT (Report)     Surgical Pathology Report             Case: [id number]                   Authorizing Provider: Juliana Khalil MD    Collected:      05/01/2017 1400        Ordering Location:   [facility]    Received:      05/01/2017 [facility] [employer] [facility]                               Pathologist:      Elaine Hector MD                                 Specimens:  A) - Duodenum, Cold biopsy duodenum, r/o celiac dx                           B) - Stomach, Cold biopsy stomach, r/o H pylori                            C) - Esophagogastric junction, Cold biopsy GE junction, r/o Christie's esophagus            D) - Large Intestine, Left/Descending Colon, Cold forceps polypectomy descending            colon polyp                                              E) - Rectum, Cold forceps polypectomy rectal polyp   LAB AP FINAL DIAGNOSIS (Report)     A  Duodenum (biopsy):  - Duodenal mucosa with no diagnostic abnormality  - No Marsh lesion  - Negative for dysplasia     B   Stomach (biopsy):  - Reactive antral and oxyntic gastropathy with intestinal metaplasia   - No H pylori identified on immunohistochemistry stain  - Negative for dysplasia     C  GE junction (biopsy):  - Cardiac-type mucosa with focal area of multilayered epithelium  - Adjoining squamous mucosa with reactive epithelial changes  - Negative for dysplasia     Note: Some observers regard multilayered epithelium as a precursor to   Chritsie's mucosa  D  Left/descending colon polyp (polypectomy):  - Tubular adenoma  - No high grade dysplasia     E  Rectal polyp (polypectomy):  - Hyperplastic polyp  - Negative for dysplasia     Electronically signed by Kristin Uribe MD on 5/3/2017 at 9:50 AM   LAB AP SURGICAL ADDITIONAL INFORMATION (Report)     These tests were developed and their performance characteristics   determined by [facility] or [facility]  They may not be cleared or approved by the [place]  Food and   Drug Administration  The FDA has determined that such clearance or   approval is not necessary  These tests are used for clinical purposes  They should not be regarded as investigational or for research  This   laboratory has been approved by [facility], designated as a high-complexity   laboratory and is qualified to perform these tests  LAB AP GROSS DESCRIPTION (Report)     A  The specimen is received in formalin, labeled with the patient's name   and medical record number, and is designated duodenum  The specimen   consists of multiple tan-pink soft tissue fragments measuring in aggregate   0 5 x 0 4 x 0 2 cm  Entirely submitted  One cassette  B  The specimen is received in formalin, labeled with the patient's name   and medical record number, and is designated stomach biopsy  The   specimen consists of 4 tan-pink soft tissue fragments measuring 0 2 cm,   0 3 cm, 0 4 cm, and 0 4 cm in greatest dimension  Entirely submitted  One   cassette  C  The specimen is received in formalin, labeled with the patient's name   and medical record number, and is designated GE junction biopsy  The   specimen consists of one white tan pink soft tissue fragment measuring 0 3   cm in greatest dimension  Entirely submitted  One cassette  D   The specimen is received in formalin, labeled with the patient's name   and medical record number, and is designated descending colon polyp  The specimen consists of one tan-pink soft tissue fragment measuring 0 2   cm in greatest dimension  Entirely submitted  One cassette  E  The specimen is received in formalin, labeled with the patient's name   and medical record number, and is designated rectal polyp  The specimen   consists of one tan-pink soft tissue fragment measuring 0 5 cm in greatest   dimension  Entirely submitted  One cassette  Note: The estimated total formalin fixation time based upon information   provided by the submitting clinician and the standard processing schedule   is 23 75 hours    MAS   LAB AP CLINICAL INFORMATION R/o celiac dx     R/o celiac dx

## 2018-01-15 NOTE — RESULT NOTES
Verified Results  * MAMMO SCREENING BILATERAL W CAD [id number] 09:31AM Jeff Roberto Order Number: [id number]    - Patient Instructions: To schedule this appointment, please contact Central Scheduling at [phone]  Do not wear any perfume, powder, lotion or deodorant on breast or underarm area  Please bring your doctors order, referral (if needed) and insurance information with you on the day of the test  Failure to bring this information may result in this test being rescheduled  Arrive 15 minutes prior to your appointment time to register  On the day of your test, please bring any prior mammogram or breast studies with you that were not performed at a [facility]  Failure to bring prior exams may result in your test needing to be rescheduled  Test Name Result Flag Reference   MAMMO SCREENING BILATERAL W CAD (Report)     Patient History:   Patient is postmenopausal    Family history of colorectal cancer at age 71 in sister  Cyst aspiration of the right breast, September 8, 2010  Took hormonal contraceptives for 10 years  Took estrogen for 5    years  Patient is a former smoker, and smoked for 45 years  Patient's    BMI is 22 1  Reason for exam: screening, asymptomatic  Mammo Screening Bilateral W CAD: October 5, 2017 - Check In #:    [de-identified]   Bilateral CC and MLO view(s) were taken  Technologist: RT Alfredo(R)(M)   Prior study comparison: September 29, 2016, mammo screening    bilateral W CAD performed at [address] [place] [address]  September 24, 2015, digital bilateral screening mammogram    performed at [address] [place] [address]  September 18, 2014,    digital bilateral screening mammogram performed at [address] [place] [address]  September 12, 2013, digital bilateral    screening mammogram performed at [address] [place] [address]  September 6, 2012, digital bilateral screening mammogram    performed at [address] [place] [address]       There are scattered fibroglandular densities  No dominant soft tissue mass, architectural distortion or    suspicious calcifications are noted in either breast  The skin    and nipple contours are within normal limits  No evidence of malignancy  No significant changes when compared with prior studies  ACR BI-RADSï¾® Assessments: BiRad:1 - Negative     Recommendation:   Routine screening mammogram of both breasts in 1 year  Analyzed by CAD     The patient is scheduled in a reminder system for screening    mammography  8-10% of cancers will be missed on mammography  Management of a    palpable abnormality must be based on clinical grounds  Patients   will be notified of their results via letter from our facility  Accredited by [employer] and FDA  Transcription Location: [facility]: [id number]     Risk Value(s):   Tyrer-Cuzick 10 Year: 0 900%, Tyrer-Cuzick Lifetime: 1 100%,    Myriad Table: 1 5%, XIOMY 5 Year: 1 3%, NCI Lifetime: 3 0%   Signed by:   Nicho Palomino MD   10/5/17       Discussion/Summary   MAMMOGRAM SHOWS NO EVIDENCE OF MALIGNANCY    REPEAT IN ONE YEAR OR SOONER WITH ANY CHANGES

## 2018-01-16 NOTE — RESULT NOTES
Verified Results  US THYROID [id number] 08:58AM [facility] Order Number: [id number]    - Patient Instructions: To schedule this appointment, please contact Central Scheduling at [phone]  Test Name Result Flag Reference   US THYROID (Report)     THYROID ULTRASOUND     INDICATION: Nodule follow-up     COMPARISON: Thyroid ultrasound 10/6/2016     TECHNIQUE:  Ultrasound of the thyroid was performed with a high frequency linear transducer in transverse and sagittal planes including volumetric imaging sweeps as well as traditional still imaging technique  FINDINGS: Normal homogeneous smooth echotexture  Right lobe: 5 2 x 1 8 x 1 5 cm  Left lobe: 5 6 x 1 7 x 1 4 cm  Isthmus: 0 4 cm  No nodules bilaterally of greater than 1cm  5 x 6 x 3 mm isoechoic nodule at the upper pole right lobe of the thyroid with smooth margins  5 x 5 x 2 mm hypoechoic nodule at the upper pole left lobe of the thyroid  Scattered colloid cyst to include a 4    mm cyst within the inferior right isthmus  IMPRESSION:   Stable subcentimeter thyroid nodules and colloid cysts without significant interval change  No new or suspicious nodule at this time  Workstation performed: [id number]     Signed by:   Donovan Durham MD   11/12/17       Discussion/Summary   STABLE THYROID NODULE AND CYST  CONTINUE TO MONITOR FOR NOW

## 2018-01-17 NOTE — RESULT NOTES
Message   #1  Please call the patient with the results of her thyroid ultrasound  #2  The radiologist states that her thyroid ultrasound looks well and recommends continued ultrasound surveillance  #3  She may  the order slip for her next ultrasound at her office visit with Dawood Langley later this month  #4  You may leave a message, if her communication consent allows for it  Verified Results  [address] [place], [place] [id number] 10:58AM Lynn Liner     Test Name Result Flag Reference   US THYROID (Report)     THYROID ULTRASOUND     INDICATION: Nontoxic thyroid nodule     COMPARISON: September 15, 2015     TECHNIQUE:  Ultrasound of the thyroid was performed with a high frequency linear transducer in transverse and sagittal planes including volumetric imaging sweeps as well as traditional still imaging technique  FINDINGS:   Mildly heterogenous in echotexture     Right gland: 4 8 x 1 6 x 1 5 cm  Small subcentimeter nodules/colitis cysts are seen within the right thyroid lobe, unchanged from the previous study  Left gland: 5 1 x 1 7 x 1 9 cm  The left thyroid lobe is heterogeneous   Again noted is a nodule in the upper pole which measures 0 6 x 0 5 x 0 2 cm on the previous study this was measuring 0 5 x 0 5 x 0 2 cm   On the previous study 9 mm x 5 mm x 7 mm nodule was seen in the left mid to upper thyroid lobe  This is not identified     Isthmus: 0 38 cm in AP dimension     A cystic appearing nodule is seen in the isthmus on the right side which measures about 0 5 x 0 3 cm, unchanged from the previous study           IMPRESSION:      THE 9 MM NODULE SEEN ON THE PREVIOUS STUDY IN THE LEFT THYROID LOBE IS NOT IDENTIFIED   ADDITIONAL SUBCENTIMETER THYROID NODULES ARE SEEN WHICH ARE STABLE     CONTINUED SURVEILLANCE       Workstation performed: [id number]     Signed by:   Doris Parra MD   10/7/16

## 2018-01-17 NOTE — RESULT NOTES
Verified Results  (1) URINALYSIS w URINE C/S REFLEX (will reflex a microscopy if leukocytes, occult blood, or nitrites are not within normal limits) [id number] 12:12PM Richy Rodrigueslander Order Number: [id number]     Test Name Result Flag Reference   COLOR Yellow     CLARITY Clear     PH UA 6 0  4 5-8 0   LEUKOCYTE ESTERASE UA Negative  Negative   NITRITE UA Negative  Negative   PROTEIN UA Negative mg/dl  Negative   GLUCOSE UA Negative mg/dl  Negative   KETONES UA Negative mg/dl  Negative   UROBILINOGEN UA 0 2 E U /dl  0 2, 1 0 E U /dl   BILIRUBIN UA Negative  Negative   BLOOD UA Negative  Negative   SPECIFIC GRAVITY UA 1 007  1 003-1 030       Discussion/Summary   URINE IS NORMAL

## 2018-01-17 NOTE — PROGRESS NOTES
Assessment    1  Encounter for preventive health examination (V70 0) (Z00 00)    Plan   Benign essential hypertension    · Valsartan 80 MG Oral Tablet; TAKE 1 TABLET DAILY  Benign essential hypertension, Hyperlipidemia    · (1) CBC/PLT/DIFF; Status:Active; Requested FNQ:[id number];    · (1) COMPREHENSIVE METABOLIC PANEL; Status:Active; Requested SANAM:[id number];    · (1) LIPID PANEL FASTING W DIRECT LDL REFLEX; Status:Active; Requested  RZP:[id number];    · (1) TSH WITH FT4 REFLEX; Status:Active; Requested KTN:[id number];   Need for prophylactic vaccination and inoculation against influenza    · Fluzone High-Dose 0 5 ML Intramuscular Suspension Prefilled Syringe  Screening for colon cancer    · *1 -  [facility] Physician Referral  Consult  Status:  Active  Requested for: 96HGZ3170  Care Summary provided  : Yes  Screening for genitourinary condition    · *VB - Urinary Incontinence Screen (Dx Z13 89 Screen for UI); Status:Complete -  Retrospective By Protocol Authorization;   Done: 23GYL0571 12:21PM    US THYROID; Status:Hold For - Scheduling; Requested FRANKLIN:[id number];   Perform:[facility]; SGW:[id number]; Ordered; For:Thyroid nodule; Ordered By:Beatriz Ayers; Discussion/Summary    CAN GO TO VITAMIN WORLD AND TRY FISH OIL 1000MG DAILY  Impression: Subsequent Annual Wellness Visit, with preventive exam as well as age and risk appropriate counseling completed  Cardiovascular screening and counseling: the risks and benefits of screening were discussed and screening is current  Diabetes screening and counseling: the risks and benefits of screening were discussed and screening is current  Colorectal cancer screening and counseling: the risks and benefits of screening were discussed and due for a colonoscopy (low risk)  Breast cancer screening and counseling: the risks and benefits of screening were discussed and screening is current     Cervical cancer screening and counseling: the risks and benefits of screening were discussed and screening not indicated  Osteoporosis screening and counseling: the risks and benefits of screening were discussed and the patient declines screening  Glaucoma screening and counseling: screening not indicated  HIV screening and counseling: screening not indicated  Immunizations: influenza vaccine is due today, the patient declines the pneumococcal vaccination, hepatitis B vaccination series is not indicated at this time due to the patient's low risk of andree the disease, the patient declines the Zostavax vaccine, the patient declines the Td vaccine and the patient declines the Tdap vaccine  Advance Directive Planning: paperwork and instructions were given to the patient  Patient Discussion: plan discussed with the patient, follow-up visit needed in one year  Chief Complaint  Patient presents today for a Medicare wellness      History of Present Illness  HPI: HERE FOR ANNUAL WELLNESS  QUIT SMOKING JUNE OF LAST YEAR  CONTINUES TO NOT SMOKE, GREAT!!!  DID NOT TOLERATE STATIN, HAD NECK SWELLING FROM IT  RELUCTANT TO START OR TRY ANOTHER STATIN  HX OF ELEVATED BP THIS PAST 2 YEARS     Welcome to [employer] and Wellness Visits: The patient is being seen for the subsequent annual wellness visit  Medicare Screening and Risk Factors   Hospitalizations: no previous hospitalizations  Medicare Screening Tests Risk Questions   Osteoporosis risk assessment: , female gender and over 48years of age  HIV risk assessment: none indicated  Drug and Alcohol Use: The patient is a former cigarette smoker  The patient reports occasional alcohol use  She has never used illicit drugs  Diet and Physical Activity: Current diet includes well balanced meals  The patient does not exercise  Mood Disorder and Cognitive Impairment Screening: She denies feeling down, depressed, or hopeless over the past two weeks   She denies feeling little interest or pleasure in doing things over the past two weeks  Cognitive impairment screening: denies difficulty learning/retaining new information, denies difficulty handling complex tasks, denies difficulty with reasoning, denies difficulty with spatial ability and orientation, denies difficulty with language and denies difficulty with behavior  Functional Ability/Level of Safety: Hearing is normal bilaterally  The patient is currently able to do activities of daily living without limitations, able to do instrumental activities of daily living without limitations, able to participate in social activities without limitations and able to drive without limitations  Activities of daily living details: does not need help using the phone, no transportation help needed, does not need help shopping, no meal preparation help needed, does not need help doing housework, does not need help doing laundry, does not need help managing medications and does not need help managing money  Fall risk factors:  alcohol use, but The patient fell 0 times in the past 12 months , no polypharmacy, no mobility impairment, no antidepressant use, no deconditioning, no postural hypotension, no sedative use, no visual impairment, no urinary incontinence, no antihypertensive use, no cognitive impairment, up and go test was normal and no previous fall  Home safety risk factors:  no unfamiliar surroundings, no loose rugs, no poor household lighting, no uneven floors, no household clutter, grab bars in the bathroom and handrails on the stairs  Advance Directives: Advance directives: no living will, no durable power of  for health care directives and no advance directives  end of life decisions were reviewed with the patient and I disagree with the patient's decisions     Co-Managers and Medical Equipment/Suppliers: See Patient Care Team   Reviewed Updated [facility]:   Last Medicare Wellness Visit Information was reviewed, patient interviewed, no change since last AWV  Preventive Quality Program 65 and Older: Falls Risk: The patient fell 0 times in the past 12 months  Patient Care Team    Care Team Member Role Specialty Office Number   [facility] Specialist Certified Clinical Nurse Specialist [phone]   Supa Colunga MD Specialist Gastroenterology Adult [phone]   Norma Olivares MD Specialist Optometry [phone]   [facility] Specialist Neurosurgery [phone]   [address]  Specialist Neurosurgery [phone]     Review of Systems    Constitutional: negative  Eyes: negative  ENT: negative  Cardiovascular: negative  Respiratory: negative  Gastrointestinal: negative  Genitourinary: negative  Musculoskeletal: negative  Integumentary and Breasts: negative  Neurological: negative  Psychiatric: negative  Endocrine: negative  Hematologic and Lymphatic: negative  Active Problems    1  Abnormal blood chemistry (790 6) (R79 9)   2  Cervicalgia (723 1) (M54 2)   3  Encounter for screening mammogram for malignant neoplasm of breast (V76 12)   (Z12 31)   4  Hyperlipidemia (272 4) (E78 5)   5  Long term use of drug (V58 69) (Z79 899)   6  Need for prophylactic vaccination against single diseases (V05 9) (Z23)   7  Need for prophylactic vaccination against Streptococcus pneumoniae (pneumococcus)   (V03 82) (Z23)   8  Need for prophylactic vaccination and inoculation against influenza (V04 81) (Z23)   9  Screening for colon cancer (V76 51) (Z12 11)   10  Screening for genitourinary condition (V81 6) (Z13 89)   11  Spondylosis of cervical region without myelopathy or radiculopathy (721 0) (M47 812)   12  Thyroid nodule (241 0) (E04 1)   13  Trapezius muscle spasm (728 85) (M62 838)   14   Vitamin D deficiency (268 9) (E55 9)    Past Medical History    · Need for prophylactic vaccination against single diseases (V05 9) (Z23)   · Need for prophylactic vaccination and inoculation against influenza (V04 81) (Z23)    The active problems and past medical history were reviewed and updated today  Surgical History    · History of Fasciotomy Of The Lateral Epicondyle Of The Elbow   · History of Total Abdominal Hysterectomy With Removal Of Both Ovaries    The surgical history was reviewed and updated today  Family History  Mother    · Family history of Melanoma  Father    · Family history of Cirrhosis, alcoholic   · Family history of CVA (cerebral vascular accident)   · Family history of Pacemaker Placement  Sister    · Family history of Colon cancer   · Family history of Diabetes    The family history was reviewed and updated today  Social History    · Consumes alcohol occasionally (V49 89) (Z78 9)   · Former smoker (P67 49) (U88 326)   · Quit June 2014 - 1/2 ppd for 40 years  · Retired   · Two children  The social history was reviewed and updated today  The social history was reviewed and is unchanged  Current Meds   1  Vitamin D 1000 UNIT CAPS; take 2 tablet daily; Therapy: 88UDU1905 to Recorded    The medication list was reviewed and updated today  Allergies    1  Atorvastatin Calcium TABS    2  No Known Environmental Allergies   3  No Known Food Allergies    Immunizations   1 2 3    Influenza  08-Oct-2013 10-Oct-2014 13-Oct-2015    PCV  Temporarily Deferred: Pt refuses, Fall 2015      PPSV  12-Oct-2010       Vitals  Signs    Systolic: 837  Diastolic: 74   Systolic: 074  Diastolic: 82  Heart Rate: 85  Respiration: 16  Temperature: 97 6 F  O2 Saturation: 98  Height: 5 ft 2 in  Weight: 136 lb 0 6 oz  BMI Calculated: 24 88  BSA Calculated: 1 62    Physical Exam    Constitutional   General appearance: No acute distress, well appearing and well nourished  Head and Face   Head and face: Normal     Eyes   Conjunctiva and lids: No swelling, erythema or discharge  Pupils and irises: Equal, round, reactive to light      Ears, Nose, Mouth, and Throat   External inspection of ears and nose: Normal  Otoscopic examination: Tympanic membranes translucent with normal light reflex  Canals patent without erythema  Nasal mucosa, septum, and turbinates: Normal without edema or erythema  Lips, teeth, and gums: Normal, good dentition  Oropharynx: Normal with no erythema, edema, exudate or lesions  Neck   Neck: Supple, symmetric, trachea midline, no masses  Thyroid: Normal, no thyromegaly  Pulmonary   Respiratory effort: No increased work of breathing or signs of respiratory distress  Auscultation of lungs: Clear to auscultation  Cardiovascular   Auscultation of heart: Normal rate and rhythm, normal S1 and S2, no murmurs  Examination of extremities for edema and/or varicosities: Normal     Abdomen   Abdomen: Non-tender, no masses  Liver and spleen: No hepatomegaly or splenomegaly  Lymphatic   Palpation of lymph nodes in neck: No lymphadenopathy  Palpation of lymph nodes in axillae: No lymphadenopathy  Musculoskeletal   Gait and station: Normal     Digits and nails: Normal without clubbing or cyanosis  Joints, bones, and muscles: Normal     Range of motion: Normal     Stability: Normal     Muscle strength/tone: Normal     Skin   Skin and subcutaneous tissue: Normal without rashes or lesions  Palpation of skin and subcutaneous tissue: Normal turgor  Neurologic   Cranial nerves: Cranial nerves II-XII intact  Cortical function: Normal mental status  Reflexes: 2+ and symmetric  Sensation: No sensory loss  Coordination: Normal finger to nose and heel to shin  Psychiatric   Judgment and insight: Normal     Orientation to person, place, and time: Normal     Recent and remote memory: Intact      Mood and affect: Normal        Results/Data  *VB - Urinary Incontinence Screen (Dx Z13 89 Screen for UI) 25Oct2016 12:21PM Rosacorwin Osborne     Test Name Result Flag Reference   Urinary Incontinence Assessment 25Oct2016       Falls Risk Assessment (Dx Z13 89 Screen for Neurologic Disorder) 25Oct2016 12:20PM User, Ahs     Test Name Result Flag Reference   Falls Risk      No falls in the past year     PHQ-2 Adult Depression Screening 25Oct2016 12:20PM User, Ahs     Test Name Result Flag Reference   PHQ-2 Adult Depression Score 0     Over the last two weeks, how often have you been bothered by any of the following problems? Little interest or pleasure in doing things: Not at all - 0  Feeling down, depressed, or hopeless: Not at all - 0   PHQ-2 Adult Depression Screening Negative         Health Management  Screening for colon cancer   COLONOSCOPY; every 10 years; Last 59EQY6603; Next Due: 65PKW2723; Active  Health Maintenance   Digital Bilateral Screening Mammogram With CAD; every 1 year; Last 87EYI7307; Next  Due: 42Clo8215; Overdue  Medicare Annual Wellness Visit; every 1 year; Next Due: 48HAR6891; Overdue  U/S Thyroid; every 1 year; Last 67LOJ0033; Next Due: 63Lmb7613;  Overdue    Future Appointments    Date/Time Provider Specialty Site   11/29/2016 11:00 AM CLEVE, Nurse Schedule  [facility]   10/26/2017 09:00 AM Darshan Shrestha, [address]  Internal Medicine [facility]     Signatures   Electronically signed by : IRAM Peralta; Oct 25 2016  1:52PM EST                       (Author)    Electronically signed by : MIRTA Caruso ; Oct 25 2016  6:37PM EST                       (Review)

## 2018-01-18 NOTE — RESULT NOTES
Verified Results  (1) Ace Galicia, STOOL [id number] 11:40AM Nita Cummings Order Number: [id number]     Test Name Result Flag Reference   H PYLORI ANTIGEN Negative  Negative   Performed at:  [address]  [id number]  : Ani Lynn MD, Phone:  [phone]       Discussion/Summary   STOOL NEGATIVE FOR H  PYLORI BACTERIA

## 2018-02-13 NOTE — RESULT NOTES
Verified Results  (1) COMPREHENSIVE METABOLIC PANEL [id number] 01:57QM [facility] Order Number: [id number]     Test Name Result Flag Reference   SODIUM 137 mmol/L  136-145   POTASSIUM 4 5 mmol/L  3 5-5 3   CHLORIDE 105 mmol/L  100-108   CARBON DIOXIDE 29 mmol/L  21-32   ANION GAP (CALC) 3 mmol/L L 4-13   BLOOD UREA NITROGEN 16 mg/dL  5-25   CREATININE 0 75 mg/dL  0 60-1 30   Standardized to IDMS reference method   CALCIUM 9 1 mg/dL  8 3-10 1   BILI, TOTAL 0 34 mg/dL  0 20-1 00   ALK PHOSPHATAS 102 U/L     ALT (SGPT) 18 U/L  12-78   Specimen collection should occur prior to Sulfasalazine and/or Sulfapyridine administration due to the potential for falsely depressed results  AST(SGOT) 15 U/L  5-45   Specimen collection should occur prior to Sulfasalazine administration due to the potential for falsely depressed results  ALBUMIN 3 5 g/dL  3 5-5 0   TOTAL PROTEIN 7 1 g/dL  6 4-8 2   eGFR 79 ml/min/1 73sq m     [facility] Disease Education Program recommendations are as follows:  GFR calculation is accurate only with a steady state creatinine  Chronic Kidney disease less than 60 ml/min/1 73 sq  meters  Kidney failure less than 15 ml/min/1 73 sq  meters  GLUCOSE FASTING 81 mg/dL  65-99   Specimen collection should occur prior to Sulfasalazine administration due to the potential for falsely depressed results  Specimen collection should occur prior to Sulfapyridine administration due to the potential for falsely elevated results  (1) CBC/PLT/DIFF [id number] 09:29AM [facility] Order Number: [id number]     Test Name Result Flag Reference   WBC COUNT 8 14 Thousand/uL  4 31-10 16   RBC COUNT 4 45 Million/uL  3 81-5 12   HEMOGLOBIN 14 3 g/dL  11 5-15 4   HEMATOCRIT 43 1 %  34 8-46  1   MCV 97 fL  82-98   MCH 32 1 pg  26 8-34 3   MCHC 33 2 g/dL  31 4-37 4   RDW 13 2 %  11 6-15 1   MPV 10 2 fL  8 9-12 7   PLATELET COUNT 658 Thousands/uL  149-390   nRBC AUTOMATED 0 /100 WBCs     NEUTROPHILS RELATIVE PERCENT 85 % H 43-75   LYMPHOCYTES RELATIVE PERCENT 9 % L 14-44   MONOCYTES RELATIVE PERCENT 5 %  4-12   EOSINOPHILS RELATIVE PERCENT 1 %  0-6   BASOPHILS RELATIVE PERCENT 0 %  0-1   NEUTROPHILS ABSOLUTE COUNT 6 93 Thousands/? ??L  1 85-7 62   LYMPHOCYTES ABSOLUTE COUNT 0 75 Thousands/? ??L  0 60-4 47   MONOCYTES ABSOLUTE COUNT 0 38 Thousand/? ??L  0 17-1 22   EOSINOPHILS ABSOLUTE COUNT 0 05 Thousand/? ??L  0 00-0 61   BASOPHILS ABSOLUTE COUNT 0 02 Thousands/? ??L  0 00-0 10   - Patient Instructions: This bloodwork is non-fasting  Please drink two glasses of water morning of bloodwork  (1) LIPID PANEL FASTING W DIRECT LDL REFLEX [id number] 09:29AM Jordi Kerr Order Number: [id number]     Test Name Result Flag Reference   CHOLESTEROL 218 mg/dL H    LDL CHOLESTEROL CALCULATED 124 mg/dL H 0-100   - Patient Instructions: This is a fasting blood test  Water, black tea or black coffee only after 9:00pm the night before test   Drink 2 glasses of water the morning of test       Triglyceride:        Normal <150 mg/dl   Borderline High 150-199 mg/dl   High 200-499 mg/dl   Very High >499 mg/dl      Cholesterol:       Desirable <200 mg/dl    Borderline High 200-239 mg/dl    High >239 mg/dl      HDL Cholesterol:       High>59 mg/dL    Low <41 mg/dL      HDL Cholesterol:       High>59 mg/dL    Low <41 mg/dL      This screening LDL is a calculated result  It does not have the accuracy of the Direct Measured LDL in the monitoring of patients with hyperlipidemia and/or statin therapy  Direct Measure LDL (EEH278) must be ordered separately in these patients  TRIGLYCERIDES 98 mg/dL  <=150   Specimen collection should occur prior to N-Acetylcysteine or Metamizole administration due to the potential for falsely depressed results     HDL,DIRECT 74 mg/dL H 40-60   Specimen collection should occur prior to Metamizole administration due to the potential for falsley depressed results  (1) TSH WITH FT4 REFLEX [id number] 09:29AM Mohit Sheehan Order Number: [id number]     Test Name Result Flag Reference   TSH 1 260 uIU/mL  0 358-3 740   Patients undergoing fluorescein dye angiography may retain small amounts of fluorescein in the body for 48-72 hours post procedure  Samples containing fluorescein can produce falsely depressed TSH values  If the patient had this procedure,a specimen should be resubmitted post fluorescein clearance  The recommended reference ranges for TSH during pregnancy are as follows:  First trimester 0 1 to 2 5 uIU/mL  Second trimester  0 2 to 3 0 uIU/mL  Third trimester 0 3 to 3 0 uIU/m       Discussion/Summary   CHOLESTEROL MILD ELEVATED  OTHERWISE LABS ALL OK

## 2018-07-20 DIAGNOSIS — K21.9 GASTROESOPHAGEAL REFLUX DISEASE WITHOUT ESOPHAGITIS: ICD-10-CM

## 2018-07-20 DIAGNOSIS — I10 ESSENTIAL HYPERTENSION: Primary | ICD-10-CM

## 2018-07-20 RX ORDER — OMEPRAZOLE 40 MG/1
1 CAPSULE, DELAYED RELEASE ORAL DAILY
COMMUNITY
Start: 2017-01-17 | End: 2018-07-24

## 2018-07-20 RX ORDER — OMEPRAZOLE 40 MG/1
40 CAPSULE, DELAYED RELEASE ORAL DAILY
Qty: 30 CAPSULE | Refills: 5 | OUTPATIENT
Start: 2018-07-20

## 2018-07-20 RX ORDER — LOSARTAN POTASSIUM 50 MG/1
1 TABLET ORAL DAILY
COMMUNITY
Start: 2017-03-10 | End: 2018-07-24

## 2018-07-20 RX ORDER — LOSARTAN POTASSIUM 50 MG/1
50 TABLET ORAL DAILY
Qty: 30 TABLET | Refills: 5 | OUTPATIENT
Start: 2018-07-20

## 2018-07-21 DIAGNOSIS — E78.5 HYPERLIPIDEMIA: ICD-10-CM

## 2018-07-21 DIAGNOSIS — E04.1 NONTOXIC SINGLE THYROID NODULE: ICD-10-CM

## 2018-07-21 DIAGNOSIS — R79.9 ABNORMAL FINDING OF BLOOD CHEMISTRY: ICD-10-CM

## 2018-07-21 DIAGNOSIS — E55.9 VITAMIN D DEFICIENCY: ICD-10-CM

## 2018-07-21 DIAGNOSIS — K64.9 HEMORRHOIDS: ICD-10-CM

## 2018-07-21 DIAGNOSIS — I10 ESSENTIAL (PRIMARY) HYPERTENSION: ICD-10-CM

## 2018-07-21 DIAGNOSIS — K21.9 GASTRO-ESOPHAGEAL REFLUX DISEASE WITHOUT ESOPHAGITIS: ICD-10-CM

## 2018-07-21 DIAGNOSIS — Z79.899 OTHER LONG TERM (CURRENT) DRUG THERAPY: ICD-10-CM

## 2018-07-21 DIAGNOSIS — Z11.59 ENCOUNTER FOR SCREENING FOR OTHER VIRAL DISEASES (CODE): ICD-10-CM

## 2018-07-24 ENCOUNTER — OFFICE VISIT (OUTPATIENT)
Dept: FAMILY MEDICINE CLINIC | Facility: CLINIC | Age: 78
End: 2018-07-24
Payer: COMMERCIAL

## 2018-07-24 VITALS
SYSTOLIC BLOOD PRESSURE: 140 MMHG | BODY MASS INDEX: 24 KG/M2 | HEART RATE: 84 BPM | DIASTOLIC BLOOD PRESSURE: 80 MMHG | WEIGHT: 131.2 LBS | RESPIRATION RATE: 12 BRPM

## 2018-07-24 DIAGNOSIS — K21.9 GASTROESOPHAGEAL REFLUX DISEASE WITHOUT ESOPHAGITIS: Primary | ICD-10-CM

## 2018-07-24 DIAGNOSIS — I10 BENIGN ESSENTIAL HYPERTENSION: ICD-10-CM

## 2018-07-24 DIAGNOSIS — E04.1 THYROID NODULE: ICD-10-CM

## 2018-07-24 DIAGNOSIS — Z11.59 ENCOUNTER FOR HEPATITIS C SCREENING TEST FOR LOW RISK PATIENT: ICD-10-CM

## 2018-07-24 DIAGNOSIS — Z12.31 ENCOUNTER FOR SCREENING MAMMOGRAM FOR MALIGNANT NEOPLASM OF BREAST: ICD-10-CM

## 2018-07-24 DIAGNOSIS — I10 ESSENTIAL HYPERTENSION: ICD-10-CM

## 2018-07-24 DIAGNOSIS — E78.2 MIXED HYPERLIPIDEMIA: ICD-10-CM

## 2018-07-24 PROCEDURE — 99214 OFFICE O/P EST MOD 30 MIN: CPT | Performed by: NURSE PRACTITIONER

## 2018-07-24 PROCEDURE — 3725F SCREEN DEPRESSION PERFORMED: CPT | Performed by: NURSE PRACTITIONER

## 2018-07-24 PROCEDURE — 1101F PT FALLS ASSESS-DOCD LE1/YR: CPT | Performed by: NURSE PRACTITIONER

## 2018-07-24 RX ORDER — OMEPRAZOLE 40 MG/1
40 CAPSULE, DELAYED RELEASE ORAL DAILY
Qty: 90 CAPSULE | Refills: 3 | Status: SHIPPED | OUTPATIENT
Start: 2018-07-24 | End: 2019-07-29 | Stop reason: SDUPTHER

## 2018-07-24 RX ORDER — BIOTIN 1 MG
2 TABLET ORAL DAILY
COMMUNITY
Start: 2013-10-08 | End: 2019-07-29

## 2018-07-24 RX ORDER — LANOLIN ALCOHOL/MO/W.PET/CERES
CREAM (GRAM) TOPICAL
COMMUNITY
Start: 2018-07-10 | End: 2019-07-29

## 2018-07-24 RX ORDER — LOSARTAN POTASSIUM 50 MG/1
50 TABLET ORAL DAILY
Qty: 90 TABLET | Refills: 3 | Status: SHIPPED | OUTPATIENT
Start: 2018-07-24 | End: 2019-07-29 | Stop reason: SDUPTHER

## 2018-07-24 NOTE — PROGRESS NOTES
Assessment/Plan:           Problem List Items Addressed This Visit        Digestive    Acid reflux disease - Primary     Cont current meds           Relevant Medications    omeprazole (PriLOSEC) 40 MG capsule    Other Relevant Orders    Comprehensive metabolic panel    CBC and differential    TSH, 3rd generation with T4 reflex    Lipid Panel with Direct LDL reflex    Hepatitis C antibody       Endocrine    Thyroid nodule    Relevant Orders    US thyroid       Cardiovascular and Mediastinum    Benign essential hypertension     Cont current meds           Relevant Medications    losartan (COZAAR) 50 mg tablet    Other Relevant Orders    Comprehensive metabolic panel    CBC and differential    TSH, 3rd generation with T4 reflex    Lipid Panel with Direct LDL reflex    Hepatitis C antibody       Other    Hyperlipidemia     Monitor off meds  Low fat diet           Relevant Orders    Comprehensive metabolic panel    CBC and differential    TSH, 3rd generation with T4 reflex    Lipid Panel with Direct LDL reflex    Hepatitis C antibody      Other Visit Diagnoses     Essential hypertension        Relevant Medications    losartan (COZAAR) 50 mg tablet    Other Relevant Orders    Comprehensive metabolic panel    CBC and differential    TSH, 3rd generation with T4 reflex    Lipid Panel with Direct LDL reflex    Hepatitis C antibody    Encounter for screening mammogram for malignant neoplasm of breast        Relevant Orders    Mammo screening bilateral w cad    Encounter for hepatitis C screening test for low risk patient        Relevant Orders    Hepatitis C antibody            Subjective:      Patient ID: Mariaelena Peña is a 78 y o  female      Here for f/u to chronic medical conditions  Feeling well         The following portions of the patient's history were reviewed and updated as appropriate: allergies, current medications, past family history, past medical history, past social history, past surgical history and problem list     Review of Systems   Constitutional: Negative for fatigue and fever  HENT: Negative for congestion, postnasal drip and rhinorrhea  Eyes: Negative for photophobia and visual disturbance  Respiratory: Negative for cough, shortness of breath and wheezing  Cardiovascular: Negative for chest pain and palpitations  Gastrointestinal: Negative for diarrhea, nausea and vomiting  Genitourinary: Negative for dysuria, frequency and urgency  Musculoskeletal: Negative for gait problem and myalgias  Skin: Negative for rash  Neurological: Negative for dizziness, light-headedness and headaches  Hematological: Negative for adenopathy  Psychiatric/Behavioral: Negative for agitation  The patient is not nervous/anxious            Objective:      /80   Pulse 84   Resp 12   Wt 59 5 kg (131 lb 3 2 oz)   LMP 07/22/1986 (LMP Unknown) Comment: hysterectomy 30 years ago  BMI 24 00 kg/m²     Family History   Problem Relation Age of Onset    Cancer Sister     Colon cancer Sister     Diabetes Sister     Melanoma Mother     Cirrhosis Father         alcoholic    Stroke Father         CVA (cerebral vascular accident)    Arrhythmia Father         pacemaker placement     Past Medical History:   Diagnosis Date    GERD (gastroesophageal reflux disease)     Hyperlipidemia     Hypertension     Spinal stenosis     Trapezius muscle spasm     last assessed - 35Mew9723     Social History     Social History    Marital status: /Civil Union     Spouse name: N/A    Number of children: 2    Years of education: N/A     Occupational History    Retired -  - [address] [place] [address] [employer]      Social History Main Topics    Smoking status: Former Smoker     Packs/day: 0 50     Quit date: 7/1/2016    Smokeless tobacco: Former User      Comment: Quit June 2014 - 1/2 ppd for 40 years per [employer]    Alcohol use No      Comment: Consumes alcohol occasionally per [employer]    Drug use: No    Sexual activity: Not on file     Other Topics Concern    Not on file     Social History Narrative    Two children - [address]       Current Outpatient Prescriptions:     Cholecalciferol (VITAMIN D3) 1000 units CAPS, Take 2 tablets by mouth daily, Disp: , Rfl:     Cholecalciferol (VITAMIN D) 2000 UNITS CAPS, Take by mouth , Disp: , Rfl:     cyanocobalamin (VITAMIN B-12) 1,000 mcg tablet, , Disp: , Rfl:     losartan (COZAAR) 50 mg tablet, Take 1 tablet (50 mg total) by mouth daily, Disp: 90 tablet, Rfl: 3    omeprazole (PriLOSEC) 40 MG capsule, Take 1 capsule (40 mg total) by mouth daily, Disp: 90 capsule, Rfl: 3  Allergies   Allergen Reactions    Atorvastatin Edema    Other      Cholesterol med- name unknown and pneumonia vaccine        Physical Exam   Constitutional: She is oriented to person, place, and time  She appears well-developed and well-nourished  HENT:   Head: Normocephalic and atraumatic  Right Ear: External ear normal    Left Ear: External ear normal    Nose: Nose normal    Mouth/Throat: Oropharynx is clear and moist    Eyes: Conjunctivae are normal    Neck: Normal range of motion  Neck supple  No thyromegaly present  Cardiovascular: Normal rate, regular rhythm, normal heart sounds and intact distal pulses  Pulmonary/Chest: Effort normal and breath sounds normal    Abdominal: Soft  Bowel sounds are normal    Musculoskeletal: Normal range of motion  Neurological: She is alert and oriented to person, place, and time  Skin: Skin is warm and dry  Psychiatric: She has a normal mood and affect  Her behavior is normal  Judgment and thought content normal    Nursing note and vitals reviewed

## 2018-09-05 ENCOUNTER — OFFICE VISIT (OUTPATIENT)
Dept: FAMILY MEDICINE CLINIC | Facility: CLINIC | Age: 78
End: 2018-09-05
Payer: COMMERCIAL

## 2018-09-05 VITALS
WEIGHT: 129 LBS | DIASTOLIC BLOOD PRESSURE: 86 MMHG | BODY MASS INDEX: 22.86 KG/M2 | RESPIRATION RATE: 12 BRPM | TEMPERATURE: 98.5 F | HEART RATE: 80 BPM | OXYGEN SATURATION: 97 % | HEIGHT: 63 IN | SYSTOLIC BLOOD PRESSURE: 138 MMHG

## 2018-09-05 DIAGNOSIS — M25.512 ACUTE PAIN OF LEFT SHOULDER: ICD-10-CM

## 2018-09-05 DIAGNOSIS — I10 BENIGN ESSENTIAL HYPERTENSION: ICD-10-CM

## 2018-09-05 DIAGNOSIS — J06.9 ACUTE UPPER RESPIRATORY INFECTION: Primary | ICD-10-CM

## 2018-09-05 PROCEDURE — 99214 OFFICE O/P EST MOD 30 MIN: CPT | Performed by: NURSE PRACTITIONER

## 2018-09-05 PROCEDURE — 1160F RVW MEDS BY RX/DR IN RCRD: CPT | Performed by: NURSE PRACTITIONER

## 2018-09-05 PROCEDURE — 3008F BODY MASS INDEX DOCD: CPT | Performed by: NURSE PRACTITIONER

## 2018-09-05 PROCEDURE — 3079F DIAST BP 80-89 MM HG: CPT | Performed by: NURSE PRACTITIONER

## 2018-09-05 PROCEDURE — 3075F SYST BP GE 130 - 139MM HG: CPT | Performed by: NURSE PRACTITIONER

## 2018-09-05 PROCEDURE — 4040F PNEUMOC VAC/ADMIN/RCVD: CPT | Performed by: NURSE PRACTITIONER

## 2018-09-05 RX ORDER — ALBUTEROL SULFATE 90 UG/1
2 AEROSOL, METERED RESPIRATORY (INHALATION) EVERY 4 HOURS PRN
Qty: 1 INHALER | Refills: 0 | Status: SHIPPED | OUTPATIENT
Start: 2018-09-05 | End: 2020-07-31

## 2018-09-05 RX ORDER — MELOXICAM 7.5 MG/1
7.5 TABLET ORAL DAILY
Qty: 30 TABLET | Refills: 1 | Status: SHIPPED | OUTPATIENT
Start: 2018-09-05 | End: 2020-07-31

## 2018-09-05 RX ORDER — AZITHROMYCIN 250 MG/1
TABLET, FILM COATED ORAL
Qty: 6 TABLET | Refills: 0 | Status: SHIPPED | OUTPATIENT
Start: 2018-09-05 | End: 2018-09-09

## 2018-09-05 NOTE — PROGRESS NOTES
Assessment/Plan:           Problem List Items Addressed This Visit        Cardiovascular and Mediastinum    Benign essential hypertension     Cont current meds              Other    Acute pain of left shoulder     First try mobic low dose due to gi issues or tylenol prn  If not better will refer to ortho for eval  Pt has high copay for therapy           Relevant Medications    meloxicam (MOBIC) 7 5 mg tablet      Other Visit Diagnoses     Acute upper respiratory infection    -  Primary    Relevant Medications    azithromycin (ZITHROMAX) 250 mg tablet    albuterol (PROAIR HFA) 90 mcg/act inhaler            Subjective:      Patient ID: Marek Fleming is a 78 y o  female  Started with a cold 4 weeks ago  Mucous in her throat  Was clear initially  Cold symptoms improved  Now cough lingers  Mucous in throat continues  No fever  No sob  Tried theraflu and cough syrup, tylenol, cough drops  Also c/o left shoulder pain and arm pain  Shoulder hurts in the trapezius area when she moves arm behind her back  Also c/o arm pain under her triceps region when she presses on it only  This started about 2 weeks ago  No known trauma or injury  She does have known cervical disc disease but she states this is not her typical neck pain so she does not feel it is coming from her neck  She does admit she has on occasion had some tingling down her left arm but no loss of          Cough   This is a new problem  The current episode started more than 1 month ago  The problem has been waxing and waning  The problem occurs every few hours  The cough is non-productive  Pertinent negatives include no chest pain, chills, ear congestion, ear pain, fever, headaches, heartburn, hemoptysis, myalgias, nasal congestion, postnasal drip, rash, rhinorrhea, sore throat, shortness of breath, sweats, weight loss or wheezing  Nothing aggravates the symptoms  She has tried OTC cough suppressant for the symptoms  The treatment provided no relief         The following portions of the patient's history were reviewed and updated as appropriate: allergies, current medications, past family history, past medical history, past social history, past surgical history and problem list       Review of Systems   Constitutional: Negative for chills, fever and weight loss  HENT: Negative for congestion, ear pain, postnasal drip, rhinorrhea and sore throat  Eyes: Negative for photophobia and visual disturbance  Respiratory: Positive for cough  Negative for hemoptysis, chest tightness, shortness of breath and wheezing  Cardiovascular: Negative for chest pain and palpitations  Gastrointestinal: Negative for constipation, diarrhea, heartburn, nausea and vomiting  Endocrine: Negative for polydipsia, polyphagia and polyuria  Genitourinary: Negative for dysuria and frequency  Musculoskeletal: Negative for back pain, myalgias and neck pain  Skin: Negative for rash  Neurological: Negative for dizziness, light-headedness and headaches  Hematological: Negative for adenopathy  Psychiatric/Behavioral: Negative for decreased concentration  The patient is not nervous/anxious            Objective:      /86 (BP Location: Right arm, Patient Position: Sitting, Cuff Size: Standard)   Pulse 80   Temp 98 5 °F (36 9 °C)   Resp 12   Ht 5' 3" (1 6 m)   Wt 58 5 kg (129 lb)   LMP 07/22/1986 (LMP Unknown) Comment: hysterectomy 30 years ago  SpO2 97%   BMI 22 85 kg/m²   Family History   Problem Relation Age of Onset    Cancer Sister     Colon cancer Sister     Diabetes Sister     Melanoma Mother     Cirrhosis Father         alcoholic    Stroke Father         CVA (cerebral vascular accident)    Arrhythmia Father         pacemaker placement     Past Medical History:   Diagnosis Date    GERD (gastroesophageal reflux disease)     Hyperlipidemia     Hypertension     Spinal stenosis     Trapezius muscle spasm     last assessed - [id number]     Social History     Social History    Marital status: /Civil Union     Spouse name: N/A    Number of children: 2    Years of education: N/A     Occupational History    Retired -  - [address] [place] [address] [employer]      Social History Main Topics    Smoking status: Former Smoker     Packs/day: 0 50     Quit date: 7/1/2016    Smokeless tobacco: Former User      Comment: Quit June 2014 - 1/2 ppd for 40 years per [employer]    Alcohol use No      Comment: Consumes alcohol occasionally per [employer]    Drug use: No    Sexual activity: Not on file     Other Topics Concern    Not on file     Social History Narrative    Two children - [address]       Current Outpatient Prescriptions:     albuterol (PROAIR HFA) 90 mcg/act inhaler, Inhale 2 puffs every 4 (four) hours as needed for wheezing, Disp: 1 Inhaler, Rfl: 0    azithromycin (ZITHROMAX) 250 mg tablet, Take 2 tablets today then 1 tablet daily x 4 days, Disp: 6 tablet, Rfl: 0    Cholecalciferol (VITAMIN D) 2000 UNITS CAPS, Take by mouth , Disp: , Rfl:     Cholecalciferol (VITAMIN D3) 1000 units CAPS, Take 2 tablets by mouth daily, Disp: , Rfl:     cyanocobalamin (VITAMIN B-12) 1,000 mcg tablet, , Disp: , Rfl:     losartan (COZAAR) 50 mg tablet, Take 1 tablet (50 mg total) by mouth daily, Disp: 90 tablet, Rfl: 3    meloxicam (MOBIC) 7 5 mg tablet, Take 1 tablet (7 5 mg total) by mouth daily, Disp: 30 tablet, Rfl: 1    omeprazole (PriLOSEC) 40 MG capsule, Take 1 capsule (40 mg total) by mouth daily, Disp: 90 capsule, Rfl: 3  Allergies   Allergen Reactions    Atorvastatin Edema    Other      Cholesterol med- name unknown and pneumonia vaccine          Physical Exam   Constitutional: She is oriented to person, place, and time  She appears well-developed and well-nourished  HENT:   Head: Normocephalic and atraumatic     Right Ear: External ear normal    Left Ear: External ear normal    Nose: Nose normal    Mouth/Throat: Oropharynx is clear and moist    Eyes: Conjunctivae are normal  Pupils are equal, round, and reactive to light  Neck: Normal range of motion  Neck supple  No thyromegaly present  Cardiovascular: Normal rate, regular rhythm and normal heart sounds  Pulmonary/Chest: Effort normal and breath sounds normal    Abdominal: Soft  Bowel sounds are normal    Musculoskeletal: Normal range of motion  Full rom left shoulder  Neg arc  Neg crossarm  Mild tender to pain with palpation left trapezius muscle  No mass noted  No mass noted under triceps region     Neurological: She is alert and oriented to person, place, and time  Skin: Skin is warm and dry  Psychiatric: She has a normal mood and affect  Her behavior is normal  Judgment and thought content normal    Nursing note and vitals reviewed

## 2018-09-06 PROBLEM — M25.512 ACUTE PAIN OF LEFT SHOULDER: Status: ACTIVE | Noted: 2018-09-06

## 2018-09-06 NOTE — ASSESSMENT & PLAN NOTE
First try mobic low dose due to gi issues or tylenol prn  If not better will refer to ortho for eval  Pt has high copay for therapy

## 2018-10-11 ENCOUNTER — HOSPITAL ENCOUNTER (OUTPATIENT)
Dept: RADIOLOGY | Age: 78
Discharge: HOME | End: 2018-10-11
Payer: COMMERCIAL

## 2018-10-11 DIAGNOSIS — E04.1 THYROID NODULE: ICD-10-CM

## 2018-10-11 DIAGNOSIS — Z12.31 ENCOUNTER FOR SCREENING MAMMOGRAM FOR MALIGNANT NEOPLASM OF BREAST: ICD-10-CM

## 2018-10-11 PROCEDURE — 77067 SCR MAMMO BI INCL CAD: CPT

## 2018-10-11 PROCEDURE — 76536 US EXAM OF HEAD AND NECK: CPT

## 2018-11-03 ENCOUNTER — HOSPITAL ENCOUNTER (EMERGENCY)
Facility: HOSPITAL | Age: 78
Discharge: HOME | End: 2018-11-03
Attending: EMERGENCY MEDICINE | Admitting: EMERGENCY MEDICINE
Payer: COMMERCIAL

## 2018-11-03 ENCOUNTER — APPOINTMENT (EMERGENCY)
Dept: RADIOLOGY | Facility: HOSPITAL | Age: 78
End: 2018-11-03
Payer: COMMERCIAL

## 2018-11-03 VITALS
BODY MASS INDEX: 23.91 KG/M2 | RESPIRATION RATE: 18 BRPM | SYSTOLIC BLOOD PRESSURE: 191 MMHG | WEIGHT: 135 LBS | OXYGEN SATURATION: 98 % | DIASTOLIC BLOOD PRESSURE: 86 MMHG | TEMPERATURE: 98.5 F | HEART RATE: 98 BPM

## 2018-11-03 DIAGNOSIS — S99.922A TOE TRAUMA, LEFT, INITIAL ENCOUNTER: Primary | ICD-10-CM

## 2018-11-03 PROCEDURE — 99283 EMERGENCY DEPT VISIT LOW MDM: CPT

## 2018-11-03 PROCEDURE — 73660 X-RAY EXAM OF TOE(S): CPT

## 2018-11-03 NOTE — ED PROVIDER NOTES
History  Chief Complaint   Patient presents with    Toe Injury     a cast iron umbrella stand fell on her left great toe  has swelling and pain  happened on wednesday  has been getting worse  took tylenol at home for pain     74-year-old lady that in the process of retrieving her umbrella dropped a cast iron umbrella [place]oya on to her left great toe on Wednesday  She has been ambulating on it normally since Wednesday  Patient did not urinate cracks pops and has not had significant toe pain since  She presents today due to increased swelling and tenderness on the most distal part of her great toe  She has been taking Tylenol which has been relieving her pain appropriately  Patient denies any loss of sensation or other trauma to the toe  History provided by:  Patient   used: No        Prior to Admission Medications   Prescriptions Last Dose Informant Patient Reported? Taking? Cholecalciferol (VITAMIN D) 2000 UNITS CAPS   Yes No   Sig: Take by mouth     Cholecalciferol (VITAMIN D3) 1000 units CAPS   Yes No   Sig: Take 2 tablets by mouth daily   albuterol (PROAIR HFA) 90 mcg/act inhaler   No No   Sig: Inhale 2 puffs every 4 (four) hours as needed for wheezing   cyanocobalamin (VITAMIN B-12) 1,000 mcg tablet   Yes No   losartan (COZAAR) 50 mg tablet   No No   Sig: Take 1 tablet (50 mg total) by mouth daily   meloxicam (MOBIC) 7 5 mg tablet   No No   Sig: Take 1 tablet (7 5 mg total) by mouth daily   omeprazole (PriLOSEC) 40 MG capsule   No No   Sig: Take 1 capsule (40 mg total) by mouth daily      Facility-Administered Medications: None       Past Medical History:   Diagnosis Date    GERD (gastroesophageal reflux disease)     Hyperlipidemia     Hypertension     Spinal stenosis     Trapezius muscle spasm     last assessed - [id number]       Past Surgical History:   Procedure Laterality Date    DEBRIDEMENT TENNIS ELBOW      ELBOW SURGERY Right     tennis elbow    FASCIOTOMY ELBOW Right     of the lateral epicondyle; last assessed - 10Oct2014    HYSTERECTOMY      WIN-BSO    [place] COLONOSCOPY FLX DX W/COLLJ SPEC WHEN PFRMD N/A 5/1/2017    Procedure: EGD AND COLONOSCOPY;  Surgeon: Talib Corona MD;  Location: [facility]; Service: Gastroenterology       Family History   Problem Relation Age of Onset    Cancer Sister     Colon cancer Sister     Diabetes Sister     Melanoma Mother     Cirrhosis Father         alcoholic    Stroke Father         CVA (cerebral vascular accident)    Arrhythmia Father         pacemaker placement     I have reviewed and agree with the history as documented  Social History   Substance Use Topics    Smoking status: Former Smoker     Packs/day: 0 50     Quit date: 7/1/2016    Smokeless tobacco: Former User      Comment: Quit June 2014 - 1/2 ppd for 40 years per [employer]    Alcohol use No      Comment: Consumes alcohol occasionally per [employer]        Review of Systems   Constitutional: Negative  HENT: Negative  Eyes: Negative  Respiratory: Negative  Endocrine: Negative  Musculoskeletal:        Left great toe pain   Allergic/Immunologic: Negative  Neurological: Negative  Hematological: Negative  Psychiatric/Behavioral: Negative  All other systems reviewed and are negative  Physical Exam  Physical Exam   Constitutional: She is oriented to person, place, and time  She appears well-developed and well-nourished  HENT:   Head: Normocephalic and atraumatic  Nose: Nose normal    Eyes: Pupils are equal, round, and reactive to light  EOM are normal    Neck: Normal range of motion  Neck supple  Cardiovascular: Normal rate, regular rhythm and normal heart sounds  Pulmonary/Chest: Effort normal and breath sounds normal    Abdominal: Soft  Bowel sounds are normal    Musculoskeletal: Normal range of motion  Redness and swelling on the left great toe  Minor hematoma at the proximal toe nail     Neurological: She is alert and oriented to person, place, and time  Skin: Skin is warm and dry  Psychiatric: She has a normal mood and affect  Judgment and thought content normal        Vital Signs  ED Triage Vitals [11/03/18 1239]   Temperature Pulse Respirations Blood Pressure SpO2   98 5 °F (36 9 °C) 98 18 (!) 191/86 98 %      Temp Source Heart Rate Source Patient Position - Orthostatic VS BP Location FiO2 (%)   Oral Monitor -- Left arm --      Pain Score       3           Vitals:    11/03/18 1239   BP: (!) 191/86   Pulse: 98       Visual Acuity      ED Medications  Medications - No data to display    Diagnostic Studies  Results Reviewed     None                 XR toe great min 2 views LEFT    (Results Pending)              Procedures  Procedures       Phone Contacts  ED Phone Contact    ED Course                               MDM  Number of Diagnoses or Management Options  Toe trauma, left, initial encounter: new and requires workup     Amount and/or Complexity of Data Reviewed  Tests in the radiology section of CPT®: ordered and reviewed  Discussion of test results with the performing providers: yes  Decide to obtain previous medical records or to obtain history from someone other than the patient: yes  Review and summarize past medical records: yes  Discuss the patient with other providers: yes  Independent visualization of images, tracings, or specimens: yes    Risk of Complications, Morbidity, and/or Mortality  Presenting problems: low  Diagnostic procedures: low      CritCare Time    Disposition  Final diagnoses: Toe trauma, left, initial encounter     Time reflects when diagnosis was documented in both MDM as applicable and the Disposition within this note     Time User Action Codes Description Comment    11/3/2018  1:06 PM Jorge Zhang Add [[id number]] Toe trauma, left, initial encounter       ED Disposition     ED Disposition Condition Comment    Discharge  Maciel Daanabelle discharge to home/self care      Condition at discharge: Stable          Follow-up Information     Follow up With Specialties Details Why [address] [place] [address], [address] [place] [address], Internal Medicine, Nurse Practitioner   [address] [facility] Freida Sotelo [address]  [phone]            Patient's Medications   Discharge Prescriptions    No medications on file     No discharge procedures on file      ED Provider  Electronically Signed by           Valentina Negron PA-C  11/03/18 [address] Marilyn Rivera PA-C  11/03/18 5718

## 2018-11-03 NOTE — DISCHARGE INSTRUCTIONS
Foot Contusion   WHAT YOU NEED TO KNOW:   A foot contusion is a bruise to the foot  DISCHARGE INSTRUCTIONS:   Medicines:   · NSAIDs:  These medicines decrease swelling and pain  NSAIDs are available without a doctor's order  Ask your healthcare provider which medicine is right for you  Ask how much to take and when to take it  Take as directed  NSAIDs can cause stomach bleeding and kidney problems if not taken correctly  · Take your medicine as directed  Contact your healthcare provider if you think your medicine is not helping or if you have side effects  Tell him of her if you are allergic to any medicine  Keep a list of the medicines, vitamins, and herbs you take  Include the amounts, and when and why you take them  Bring the list or the pill bottles to follow-up visits  Carry your medicine list with you in case of an emergency  Follow up with your healthcare provider as directed:  Write down your questions so you remember to ask them during your visits  Care for your foot: Follow your treatment plan to help decrease your pain and improve your muscle movement  · Rest:  You will need to rest your foot for 1 to 2 days after your injury  This will help decrease the risk of more damage  · Ice:  Ice helps decrease swelling and pain  Ice may also help prevent tissue damage  Use an ice pack, or put crushed ice in a plastic bag  Cover it with a towel and place it on your foot for 15 to 20 minutes every hour or as directed  · Compression:  Compression (tight hold) provides support and helps decrease swelling and movement so your foot can heal  You may be told to keep your foot wrapped with a tight elastic bandage  Follow instructions about how to apply your bandage  Do not massage your foot  You could cause more damage or pain  · Elevation:  Keep your foot raised above the level of your heart while you are sitting or lying down  This will help decrease or limit swelling   Use pillows, blankets, or rolled towels to elevate your foot comfortably  Exercise your foot:  You may be given gentle exercises to improve your foot movement and help decrease stiffness  Ask when you can return to your normal activities or sports  Prevent another injury:   · Wear equipment to protect yourself when you play sports  · Make sure your shoes fit properly  · Always wear shoes on streets or sidewalks  · Clean spills off the floor right away to avoid slipping or hitting your foot  · Make sure your home is well lit when you get up during the night  This will help you avoid hurting your foot in the dark  Contact your healthcare provider if:   · You have increased swelling on your foot  · You have severe foot pain  · You are not able to move your foot  · You have questions or concerns about your injury or treatment  © 2017 [address] [employer]  Information is for End User's use only and may not be sold, redistributed or otherwise used for commercial purposes  All illustrations and images included in CareNotes® are the copyrighted property of [employer]  or [employer]  The above information is an  only  It is not intended as medical advice for individual conditions or treatments  Talk to your doctor, nurse or pharmacist before following any medical regimen to see if it is safe and effective for you

## 2019-01-24 LAB
ALBUMIN SERPL-MCNC: 4.4 G/DL (ref 3.6–5.1)
ALBUMIN/GLOB SERPL: 1.7 (CALC) (ref 1–2.5)
ALP SERPL-CCNC: 113 U/L (ref 33–130)
ALT SERPL-CCNC: 12 U/L (ref 6–29)
AST SERPL-CCNC: 18 U/L (ref 10–35)
BASOPHILS # BLD AUTO: 20 CELLS/UL (ref 0–200)
BASOPHILS NFR BLD AUTO: 0.4 %
BILIRUB SERPL-MCNC: 0.5 MG/DL (ref 0.2–1.2)
BUN SERPL-MCNC: 17 MG/DL (ref 7–25)
BUN/CREAT SERPL: NORMAL (CALC) (ref 6–22)
CALCIUM SERPL-MCNC: 9.8 MG/DL (ref 8.6–10.4)
CHLORIDE SERPL-SCNC: 101 MMOL/L (ref 98–110)
CHOLEST SERPL-MCNC: 228 MG/DL
CHOLEST/HDLC SERPL: 2.9 (CALC)
CO2 SERPL-SCNC: 28 MMOL/L (ref 20–32)
CREAT SERPL-MCNC: 0.84 MG/DL (ref 0.6–0.93)
EOSINOPHIL # BLD AUTO: 80 CELLS/UL (ref 15–500)
EOSINOPHIL NFR BLD AUTO: 1.6 %
ERYTHROCYTE [DISTWIDTH] IN BLOOD BY AUTOMATED COUNT: 12.6 % (ref 11–15)
GLOBULIN SER CALC-MCNC: 2.6 G/DL (CALC) (ref 1.9–3.7)
GLUCOSE SERPL-MCNC: 85 MG/DL (ref 65–99)
HCT VFR BLD AUTO: 44 % (ref 35–45)
HCV AB S/CO SERPL IA: 0.02
HCV AB SERPL QL IA: NORMAL
HDLC SERPL-MCNC: 78 MG/DL
HGB BLD-MCNC: 15 G/DL (ref 11.7–15.5)
LDLC SERPL CALC-MCNC: 128 MG/DL (CALC)
LYMPHOCYTES # BLD AUTO: 1195 CELLS/UL (ref 850–3900)
LYMPHOCYTES NFR BLD AUTO: 23.9 %
MCH RBC QN AUTO: 32.3 PG (ref 27–33)
MCHC RBC AUTO-ENTMCNC: 34.1 G/DL (ref 32–36)
MCV RBC AUTO: 94.6 FL (ref 80–100)
MONOCYTES # BLD AUTO: 310 CELLS/UL (ref 200–950)
MONOCYTES NFR BLD AUTO: 6.2 %
NEUTROPHILS # BLD AUTO: 3395 CELLS/UL (ref 1500–7800)
NEUTROPHILS NFR BLD AUTO: 67.9 %
NONHDLC SERPL-MCNC: 150 MG/DL (CALC)
PLATELET # BLD AUTO: 339 THOUSAND/UL (ref 140–400)
PMV BLD REES-ECKER: 10.8 FL (ref 7.5–12.5)
POTASSIUM SERPL-SCNC: 4.3 MMOL/L (ref 3.5–5.3)
PROT SERPL-MCNC: 7 G/DL (ref 6.1–8.1)
RBC # BLD AUTO: 4.65 MILLION/UL (ref 3.8–5.1)
SL AMB EGFR AFRICAN AMERICAN: 78 ML/MIN/1.73M2
SL AMB EGFR NON AFRICAN AMERICAN: 68 ML/MIN/1.73M2
SODIUM SERPL-SCNC: 139 MMOL/L (ref 135–146)
TRIGL SERPL-MCNC: 111 MG/DL
TSH SERPL-ACNC: 1.58 MIU/L (ref 0.4–4.5)
WBC # BLD AUTO: 5 THOUSAND/UL (ref 3.8–10.8)

## 2019-01-28 ENCOUNTER — OFFICE VISIT (OUTPATIENT)
Dept: FAMILY MEDICINE CLINIC | Facility: CLINIC | Age: 79
End: 2019-01-28
Payer: COMMERCIAL

## 2019-01-28 VITALS
OXYGEN SATURATION: 98 % | DIASTOLIC BLOOD PRESSURE: 74 MMHG | WEIGHT: 131.2 LBS | HEART RATE: 82 BPM | BODY MASS INDEX: 23.25 KG/M2 | HEIGHT: 63 IN | SYSTOLIC BLOOD PRESSURE: 130 MMHG | RESPIRATION RATE: 12 BRPM

## 2019-01-28 DIAGNOSIS — K21.9 GASTROESOPHAGEAL REFLUX DISEASE WITHOUT ESOPHAGITIS: ICD-10-CM

## 2019-01-28 DIAGNOSIS — M25.512 ACUTE PAIN OF LEFT SHOULDER: ICD-10-CM

## 2019-01-28 DIAGNOSIS — I10 BENIGN ESSENTIAL HYPERTENSION: Primary | ICD-10-CM

## 2019-01-28 DIAGNOSIS — E78.2 MIXED HYPERLIPIDEMIA: ICD-10-CM

## 2019-01-28 PROCEDURE — 1036F TOBACCO NON-USER: CPT | Performed by: NURSE PRACTITIONER

## 2019-01-28 PROCEDURE — 3078F DIAST BP <80 MM HG: CPT | Performed by: NURSE PRACTITIONER

## 2019-01-28 PROCEDURE — 3075F SYST BP GE 130 - 139MM HG: CPT | Performed by: NURSE PRACTITIONER

## 2019-01-28 PROCEDURE — 99214 OFFICE O/P EST MOD 30 MIN: CPT | Performed by: NURSE PRACTITIONER

## 2019-01-28 PROCEDURE — 1160F RVW MEDS BY RX/DR IN RCRD: CPT | Performed by: NURSE PRACTITIONER

## 2019-01-28 NOTE — PROGRESS NOTES
Assessment/Plan:           Problem List Items Addressed This Visit        Digestive    Acid reflux disease    Relevant Orders    Comprehensive metabolic panel    Lipid Panel with Direct LDL reflex    CBC and differential       Cardiovascular and Mediastinum    Benign essential hypertension - Primary    Relevant Orders    Comprehensive metabolic panel    Lipid Panel with Direct LDL reflex    CBC and differential       Other    Hyperlipidemia    Relevant Orders    Comprehensive metabolic panel    Lipid Panel with Direct LDL reflex    CBC and differential    RESOLVED: Acute pain of left shoulder    Relevant Orders    Comprehensive metabolic panel    Lipid Panel with Direct LDL reflex    CBC and differential            Subjective:      Patient ID: Tarah Escalante is a 70 y o  female  Here for f/u to chronic medical conditions   Feeling well  Tolerating meds without side affects  Labs reviewed with patient          The following portions of the patient's history were reviewed and updated as appropriate: allergies, current medications, past family history, past medical history, past social history, past surgical history and problem list     Review of Systems   Constitutional: Negative for fatigue and fever  HENT: Negative for congestion, ear pain and rhinorrhea  Eyes: Negative for photophobia and visual disturbance  Respiratory: Negative for cough, shortness of breath and wheezing  Cardiovascular: Negative for chest pain and palpitations  Gastrointestinal: Negative for constipation, diarrhea, nausea and vomiting  Endocrine: Negative for polydipsia, polyphagia and polyuria  Genitourinary: Negative for dysuria, frequency and hematuria  Musculoskeletal: Negative for arthralgias, myalgias and neck pain  Skin: Negative for rash  Neurological: Negative for dizziness, light-headedness and headaches  Hematological: Negative for adenopathy     Psychiatric/Behavioral: Negative for dysphoric mood and sleep disturbance  The patient is not nervous/anxious            Objective:      /82 (BP Location: Right arm, Patient Position: Sitting, Cuff Size: Standard)   Pulse 82   Resp 12   Ht 5' 3" (1 6 m)   Wt 59 5 kg (131 lb 3 2 oz)   LMP 07/22/1986 (LMP Unknown) Comment: hysterectomy 30 years ago  SpO2 98%   BMI 23 24 kg/m²     /74   Pulse 82   Resp 12   Ht 5' 3" (1 6 m)   Wt 59 5 kg (131 lb 3 2 oz)   LMP 07/22/1986 (LMP Unknown) Comment: hysterectomy 30 years ago  SpO2 98%   BMI 23 24 kg/m²     Family History   Problem Relation Age of Onset    Cancer Sister     Colon cancer Sister     Diabetes Sister     Melanoma Mother     Cirrhosis Father         alcoholic    Stroke Father         CVA (cerebral vascular accident)    Arrhythmia Father         pacemaker placement     Past Medical History:   Diagnosis Date    GERD (gastroesophageal reflux disease)     Hyperlipidemia     Hypertension     Spinal stenosis     Trapezius muscle spasm     last assessed - [id number]     Social History     Social History    Marital status: /Civil Union     Spouse name: N/A    Number of children: 2    Years of education: N/A     Occupational History    Retired -  - [address] [place] [address] [email or web address]      Social History Main Topics    Smoking status: Former Smoker     Packs/day: 0 50     Quit date: 7/1/2016    Smokeless tobacco: Former User      Comment: Quit June 2014 - 1/2 ppd for 40 years per [employer]    Alcohol use No      Comment: Consumes alcohol occasionally per [employer]    Drug use: No    Sexual activity: Not on file     Other Topics Concern    Not on file     Social History Narrative    Two children - [address]       Current Outpatient Prescriptions:     albuterol (PROAIR HFA) 90 mcg/act inhaler, Inhale 2 puffs every 4 (four) hours as needed for wheezing, Disp: 1 Inhaler, Rfl: 0    Cholecalciferol (VITAMIN D) 2000 UNITS CAPS, Take by mouth , Disp: , Rfl:     Cholecalciferol (VITAMIN D3) 1000 units CAPS, Take 2 tablets by mouth daily, Disp: , Rfl:     cyanocobalamin (VITAMIN B-12) 1,000 mcg tablet, , Disp: , Rfl:     losartan (COZAAR) 50 mg tablet, Take 1 tablet (50 mg total) by mouth daily, Disp: 90 tablet, Rfl: 3    meloxicam (MOBIC) 7 5 mg tablet, Take 1 tablet (7 5 mg total) by mouth daily, Disp: 30 tablet, Rfl: 1    omeprazole (PriLOSEC) 40 MG capsule, Take 1 capsule (40 mg total) by mouth daily, Disp: 90 capsule, Rfl: 3  Allergies   Allergen Reactions    Atorvastatin Edema    Other      Cholesterol med- name unknown and pneumonia vaccine    Pneumococcal Vaccines Edema        Physical Exam   Constitutional: She is oriented to person, place, and time  She appears well-developed and well-nourished  HENT:   Head: Normocephalic and atraumatic  Right Ear: External ear normal    Left Ear: External ear normal    Nose: Nose normal    Mouth/Throat: Oropharynx is clear and moist    Eyes: Conjunctivae are normal    Neck: Normal range of motion  Neck supple  No thyromegaly present  Cardiovascular: Normal rate, regular rhythm, normal heart sounds and intact distal pulses  Pulmonary/Chest: Effort normal and breath sounds normal    Abdominal: Soft  Bowel sounds are normal    Musculoskeletal: Normal range of motion  Neurological: She is alert and oriented to person, place, and time  Skin: Skin is warm and dry  Psychiatric: She has a normal mood and affect  Her behavior is normal  Judgment and thought content normal    Nursing note and vitals reviewed          Recent Results (from the past 1008 hour(s))   Lipid Panel with Direct LDL reflex    Collection Time: 01/23/19  8:24 AM   Result Value Ref Range    Total Cholesterol 228 (H) <200 mg/dL    HDL 78 >50 mg/dL    Triglycerides 111 <150 mg/dL    LDL Direct 128 (H) mg/dL (calc)    Chol HDLC Ratio 2 9 <5 0 (calc)    Non-HDL Cholesterol 150 (H) <130 mg/dL (calc)   Comprehensive metabolic panel    Collection Time: 01/23/19  8:24 AM   Result Value Ref Range    Glucose, Random 85 65 - 99 mg/dL    BUN 17 7 - 25 mg/dL    Creatinine 0 84 0 60 - 0 93 mg/dL    eGFR Non  68 > OR = 60 mL/min/1 73m2    SL AMB EGFR  78 > OR = 60 mL/min/1 73m2    SL AMB BUN/CREATININE RATIO NOT APPLICABLE 6 - 22 (calc)    Sodium 139 135 - 146 mmol/L    Potassium 4 3 3 5 - 5 3 mmol/L    Chloride 101 98 - 110 mmol/L    CO2 28 20 - 32 mmol/L    SL AMB CALCIUM 9 8 8 6 - 10 4 mg/dL    SL AMB PROTEIN, TOTAL 7 0 6 1 - 8 1 g/dL    Albumin 4 4 3 6 - 5 1 g/dL    Globulin 2 6 1 9 - 3 7 g/dL (calc)    Albumin/Globulin Ratio 1 7 1 0 - 2 5 (calc)    TOTAL BILIRUBIN 0 5 0 2 - 1 2 mg/dL    Alkaline Phosphatase 113 33 - 130 U/L    SL AMB AST 18 10 - 35 U/L    SL AMB ALT 12 6 - 29 U/L   CBC and differential    Collection Time: 01/23/19  8:24 AM   Result Value Ref Range    White Blood Cell Count 5 0 3 8 - 10 8 Thousand/uL    Red Blood Cell Count 4 65 3 80 - 5 10 Million/uL    Hemoglobin 15 0 11 7 - 15 5 g/dL    HCT 44 0 35 0 - 45 0 %    MCV 94 6 80 0 - 100 0 fL    MCH 32 3 27 0 - 33 0 pg    MCHC 34 1 32 0 - 36 0 g/dL    RDW 12 6 11 0 - 15 0 %    Platelet Count 095 706 - 400 Thousand/uL    SL AMB MPV 10 8 7 5 - 12 5 fL    Neutrophils (Absolute) 3,395 1,500 - 7,800 cells/uL    Lymphocytes (Absolute) 1,195 850 - 3,900 cells/uL    Monocytes (Absolute) 310 200 - 950 cells/uL    Eosinophils (Absolute) 80 15 - 500 cells/uL    Basophils ABS 20 0 - 200 cells/uL    Neutrophils 67 9 %    Lymphocytes 23 9 %    Monocytes 6 2 %    Eosinophils 1 6 %    Basophils PCT 0 4 %   Hepatitis C Ab W/Refl To HCV RNA, Qn, PCR    Collection Time: 01/23/19  8:24 AM   Result Value Ref Range    HEP C AB NON-REACTIVE NON-REACTIVE    Signal to Cut-Off 0 02 <1 00   TSH, 3rd generation with Free T4 reflex    Collection Time: 01/23/19  8:24 AM   Result Value Ref Range    TSH W/RFX TO FREE T4 1 58 0 40 - 4 50 mIU/L

## 2019-05-21 ENCOUNTER — OFFICE VISIT (OUTPATIENT)
Dept: FAMILY MEDICINE CLINIC | Facility: CLINIC | Age: 79
End: 2019-05-21
Payer: COMMERCIAL

## 2019-05-21 VITALS
WEIGHT: 126.2 LBS | SYSTOLIC BLOOD PRESSURE: 158 MMHG | BODY MASS INDEX: 22.36 KG/M2 | DIASTOLIC BLOOD PRESSURE: 80 MMHG | OXYGEN SATURATION: 97 % | HEIGHT: 63 IN | RESPIRATION RATE: 12 BRPM | HEART RATE: 104 BPM

## 2019-05-21 DIAGNOSIS — R10.30 LOWER ABDOMINAL PAIN: Primary | ICD-10-CM

## 2019-05-21 PROCEDURE — 1101F PT FALLS ASSESS-DOCD LE1/YR: CPT | Performed by: NURSE PRACTITIONER

## 2019-05-21 PROCEDURE — 3725F SCREEN DEPRESSION PERFORMED: CPT | Performed by: NURSE PRACTITIONER

## 2019-05-21 PROCEDURE — 1160F RVW MEDS BY RX/DR IN RCRD: CPT | Performed by: NURSE PRACTITIONER

## 2019-05-21 PROCEDURE — 99213 OFFICE O/P EST LOW 20 MIN: CPT | Performed by: NURSE PRACTITIONER

## 2019-05-25 ENCOUNTER — HOSPITAL ENCOUNTER (EMERGENCY)
Facility: HOSPITAL | Age: 79
Discharge: HOME | End: 2019-05-25
Attending: EMERGENCY MEDICINE | Admitting: EMERGENCY MEDICINE
Payer: COMMERCIAL

## 2019-05-25 VITALS
TEMPERATURE: 97.8 F | OXYGEN SATURATION: 99 % | BODY MASS INDEX: 22.81 KG/M2 | RESPIRATION RATE: 18 BRPM | HEART RATE: 72 BPM | SYSTOLIC BLOOD PRESSURE: 165 MMHG | WEIGHT: 128.75 LBS | DIASTOLIC BLOOD PRESSURE: 76 MMHG

## 2019-05-25 DIAGNOSIS — M54.31 SCIATICA OF RIGHT SIDE: ICD-10-CM

## 2019-05-25 DIAGNOSIS — M54.9 BACK PAIN: Primary | ICD-10-CM

## 2019-05-25 LAB
BACTERIA UR QL AUTO: ABNORMAL /HPF
BILIRUB UR QL STRIP: NEGATIVE
CLARITY UR: CLEAR
COLOR UR: YELLOW
GLUCOSE UR STRIP-MCNC: NEGATIVE MG/DL
HGB UR QL STRIP.AUTO: ABNORMAL
INR PPP: 0.98 (ref 0.86–1.17)
KETONES UR STRIP-MCNC: NEGATIVE MG/DL
LEUKOCYTE ESTERASE UR QL STRIP: NEGATIVE
NITRITE UR QL STRIP: NEGATIVE
NON-SQ EPI CELLS URNS QL MICRO: ABNORMAL /HPF
PH UR STRIP.AUTO: 5.5 [PH]
PROT UR STRIP-MCNC: NEGATIVE MG/DL
PROTHROMBIN TIME: 12.7 SECONDS (ref 11.8–14.2)
RBC #/AREA URNS AUTO: ABNORMAL /HPF
SP GR UR STRIP.AUTO: 1.01 (ref 1–1.03)
UROBILINOGEN UR QL STRIP.AUTO: 0.2 E.U./DL
WBC #/AREA URNS AUTO: ABNORMAL /HPF

## 2019-05-25 PROCEDURE — 81001 URINALYSIS AUTO W/SCOPE: CPT | Performed by: PHYSICIAN ASSISTANT

## 2019-05-25 PROCEDURE — 36415 COLL VENOUS BLD VENIPUNCTURE: CPT | Performed by: PHYSICIAN ASSISTANT

## 2019-05-25 PROCEDURE — 99283 EMERGENCY DEPT VISIT LOW MDM: CPT | Performed by: PHYSICIAN ASSISTANT

## 2019-05-25 PROCEDURE — 85610 PROTHROMBIN TIME: CPT | Performed by: PHYSICIAN ASSISTANT

## 2019-05-25 PROCEDURE — 99283 EMERGENCY DEPT VISIT LOW MDM: CPT

## 2019-05-25 RX ORDER — CYCLOBENZAPRINE HCL 10 MG
10 TABLET ORAL
Qty: 5 TABLET | Refills: 0 | Status: SHIPPED | OUTPATIENT
Start: 2019-05-25 | End: 2019-07-29

## 2019-05-25 RX ORDER — ACETAMINOPHEN 325 MG/1
650 TABLET ORAL ONCE
Status: COMPLETED | OUTPATIENT
Start: 2019-05-25 | End: 2019-05-25

## 2019-05-25 RX ADMIN — ACETAMINOPHEN 650 MG: 325 TABLET, FILM COATED ORAL at 14:06

## 2019-07-16 LAB
ALBUMIN SERPL-MCNC: 4 G/DL (ref 3.6–5.1)
ALBUMIN/GLOB SERPL: 1.6 (CALC) (ref 1–2.5)
ALP SERPL-CCNC: 95 U/L (ref 33–130)
ALT SERPL-CCNC: 11 U/L (ref 6–29)
AST SERPL-CCNC: 15 U/L (ref 10–35)
BASOPHILS # BLD AUTO: 22 CELLS/UL (ref 0–200)
BASOPHILS NFR BLD AUTO: 0.5 %
BILIRUB SERPL-MCNC: 0.5 MG/DL (ref 0.2–1.2)
BUN SERPL-MCNC: 15 MG/DL (ref 7–25)
BUN/CREAT SERPL: NORMAL (CALC) (ref 6–22)
CALCIUM SERPL-MCNC: 9.7 MG/DL (ref 8.6–10.4)
CHLORIDE SERPL-SCNC: 103 MMOL/L (ref 98–110)
CHOLEST SERPL-MCNC: 208 MG/DL
CHOLEST/HDLC SERPL: 3.5 (CALC)
CO2 SERPL-SCNC: 30 MMOL/L (ref 20–32)
CREAT SERPL-MCNC: 0.78 MG/DL (ref 0.6–0.93)
EOSINOPHIL # BLD AUTO: 69 CELLS/UL (ref 15–500)
EOSINOPHIL NFR BLD AUTO: 1.6 %
ERYTHROCYTE [DISTWIDTH] IN BLOOD BY AUTOMATED COUNT: 13.1 % (ref 11–15)
GLOBULIN SER CALC-MCNC: 2.5 G/DL (CALC) (ref 1.9–3.7)
GLUCOSE SERPL-MCNC: 86 MG/DL (ref 65–99)
HCT VFR BLD AUTO: 43.4 % (ref 35–45)
HDLC SERPL-MCNC: 59 MG/DL
HGB BLD-MCNC: 14.4 G/DL (ref 11.7–15.5)
LDLC SERPL CALC-MCNC: 125 MG/DL (CALC)
LYMPHOCYTES # BLD AUTO: 1269 CELLS/UL (ref 850–3900)
LYMPHOCYTES NFR BLD AUTO: 29.5 %
MCH RBC QN AUTO: 31.9 PG (ref 27–33)
MCHC RBC AUTO-ENTMCNC: 33.2 G/DL (ref 32–36)
MCV RBC AUTO: 96 FL (ref 80–100)
MONOCYTES # BLD AUTO: 280 CELLS/UL (ref 200–950)
MONOCYTES NFR BLD AUTO: 6.5 %
NEUTROPHILS # BLD AUTO: 2662 CELLS/UL (ref 1500–7800)
NEUTROPHILS NFR BLD AUTO: 61.9 %
NONHDLC SERPL-MCNC: 149 MG/DL (CALC)
PLATELET # BLD AUTO: 284 THOUSAND/UL (ref 140–400)
PMV BLD REES-ECKER: 10.5 FL (ref 7.5–12.5)
POTASSIUM SERPL-SCNC: 4.3 MMOL/L (ref 3.5–5.3)
PROT SERPL-MCNC: 6.5 G/DL (ref 6.1–8.1)
RBC # BLD AUTO: 4.52 MILLION/UL (ref 3.8–5.1)
SL AMB EGFR AFRICAN AMERICAN: 86 ML/MIN/1.73M2
SL AMB EGFR NON AFRICAN AMERICAN: 74 ML/MIN/1.73M2
SODIUM SERPL-SCNC: 139 MMOL/L (ref 135–146)
TRIGL SERPL-MCNC: 125 MG/DL
WBC # BLD AUTO: 4.3 THOUSAND/UL (ref 3.8–10.8)

## 2019-07-29 ENCOUNTER — OFFICE VISIT (OUTPATIENT)
Dept: FAMILY MEDICINE CLINIC | Facility: CLINIC | Age: 79
End: 2019-07-29
Payer: COMMERCIAL

## 2019-07-29 VITALS
DIASTOLIC BLOOD PRESSURE: 68 MMHG | HEIGHT: 63 IN | SYSTOLIC BLOOD PRESSURE: 124 MMHG | BODY MASS INDEX: 22.18 KG/M2 | HEART RATE: 97 BPM | RESPIRATION RATE: 12 BRPM | WEIGHT: 125.2 LBS | OXYGEN SATURATION: 95 %

## 2019-07-29 DIAGNOSIS — E78.2 MIXED HYPERLIPIDEMIA: ICD-10-CM

## 2019-07-29 DIAGNOSIS — K21.9 GASTROESOPHAGEAL REFLUX DISEASE WITHOUT ESOPHAGITIS: ICD-10-CM

## 2019-07-29 DIAGNOSIS — Z12.31 ENCOUNTER FOR SCREENING MAMMOGRAM FOR MALIGNANT NEOPLASM OF BREAST: ICD-10-CM

## 2019-07-29 DIAGNOSIS — I10 ESSENTIAL HYPERTENSION: Primary | ICD-10-CM

## 2019-07-29 DIAGNOSIS — Z00.00 MEDICARE ANNUAL WELLNESS VISIT, SUBSEQUENT: ICD-10-CM

## 2019-07-29 PROCEDURE — 1036F TOBACCO NON-USER: CPT | Performed by: NURSE PRACTITIONER

## 2019-07-29 PROCEDURE — 1125F AMNT PAIN NOTED PAIN PRSNT: CPT | Performed by: NURSE PRACTITIONER

## 2019-07-29 PROCEDURE — 99214 OFFICE O/P EST MOD 30 MIN: CPT | Performed by: NURSE PRACTITIONER

## 2019-07-29 PROCEDURE — 1170F FXNL STATUS ASSESSED: CPT | Performed by: NURSE PRACTITIONER

## 2019-07-29 PROCEDURE — 3074F SYST BP LT 130 MM HG: CPT | Performed by: NURSE PRACTITIONER

## 2019-07-29 PROCEDURE — 1160F RVW MEDS BY RX/DR IN RCRD: CPT | Performed by: NURSE PRACTITIONER

## 2019-07-29 PROCEDURE — G0439 PPPS, SUBSEQ VISIT: HCPCS | Performed by: NURSE PRACTITIONER

## 2019-07-29 RX ORDER — OMEPRAZOLE 40 MG/1
40 CAPSULE, DELAYED RELEASE ORAL DAILY
Qty: 90 CAPSULE | Refills: 3 | Status: SHIPPED | OUTPATIENT
Start: 2019-07-29 | End: 2020-07-17

## 2019-07-29 RX ORDER — LOSARTAN POTASSIUM 50 MG/1
50 TABLET ORAL DAILY
Qty: 90 TABLET | Refills: 3 | Status: SHIPPED | OUTPATIENT
Start: 2019-07-29 | End: 2020-07-17

## 2019-07-29 NOTE — PROGRESS NOTES
Assessment and Plan:     Problem List Items Addressed This Visit        Digestive    Acid reflux disease    Relevant Medications    omeprazole (PriLOSEC) 40 MG capsule       Cardiovascular and Mediastinum    Essential hypertension - Primary    Relevant Medications    losartan (COZAAR) 50 mg tablet       Other    Hyperlipidemia     Monitor for now  Low fat diet             Other Visit Diagnoses     Encounter for screening mammogram for malignant neoplasm of breast        Relevant Orders    Mammo screening bilateral w cad    Medicare annual wellness visit, subsequent             History of Present Illness:     Patient presents for Medicare Annual Wellness visit    Patient Care Team:  IRAM Washington as PCP - General (Family Medicine)  MD Olive Tran PA-C Jenkins Clink, CRNP Delphina Perish, MD Chalmer Huntsman, MD as Endoscopist     Problem List:     Patient Active Problem List   Diagnosis    Acid reflux disease    Essential hypertension    Hyperlipidemia    Spondylosis of cervical region without myelopathy or radiculopathy    Thyroid nodule    Vitamin D deficiency      Past Medical and Surgical History:     Past Medical History:   Diagnosis Date    GERD (gastroesophageal reflux disease)     Hyperlipidemia     Hypertension     Spinal stenosis     Trapezius muscle spasm     last assessed - [id number]     Past Surgical History:   Procedure Laterality Date    DEBRIDEMENT TENNIS ELBOW      ELBOW SURGERY Right     tennis elbow    FASCIOTOMY ELBOW Right     of the lateral epicondyle; last assessed - [id number]    HYSTERECTOMY      WIN-BSO    MT COLONOSCOPY FLX DX W/COLLJ SPEC WHEN PFRMD N/A 5/1/2017    Procedure: EGD AND COLONOSCOPY;  Surgeon: Kavitha Gregorio MD;  Location: [facility];   Service: Gastroenterology      Family History:     Family History   Problem Relation Age of Onset    Cancer Sister     Colon cancer Sister     Diabetes Sister     Melanoma Mother     Cirrhosis Father         alcoholic    Stroke Father         CVA (cerebral vascular accident)    Arrhythmia Father         pacemaker placement      Social History:     Social History     Tobacco Use   Smoking Status Former Smoker    Packs/day: 0 50    Last attempt to quit: 7/1/2016    Years since quitting: 3 0   Smokeless Tobacco Former User   Tobacco Comment    Quit June 2014 - 1/2 ppd for 40 years per [employer]     Social History     Substance and Sexual Activity   Alcohol Use No    Comment: Consumes alcohol occasionally per [employer]     Social History     Substance and Sexual Activity   Drug Use No      Medications and Allergies:     Current Outpatient Medications   Medication Sig Dispense Refill    albuterol (PROAIR HFA) 90 mcg/act inhaler Inhale 2 puffs every 4 (four) hours as needed for wheezing (Patient not taking: Reported on 5/25/2019) 1 Inhaler 0    Cholecalciferol (VITAMIN D) 2000 UNITS CAPS Take by mouth   losartan (COZAAR) 50 mg tablet Take 1 tablet (50 mg total) by mouth daily 90 tablet 3    meloxicam (MOBIC) 7 5 mg tablet Take 1 tablet (7 5 mg total) by mouth daily (Patient not taking: Reported on 5/25/2019) 30 tablet 1    omeprazole (PriLOSEC) 40 MG capsule Take 1 capsule (40 mg total) by mouth daily 90 capsule 3     No current facility-administered medications for this visit        Allergies   Allergen Reactions    Atorvastatin Edema    Other      Cholesterol med- name unknown and pneumonia vaccine    Pneumococcal Vaccines Edema      Immunizations:     Immunization History   Administered Date(s) Administered     Influenza (IM) Preservative Free 11/24/2018    INFLUENZA 10/25/2016, 10/24/2017, 11/01/2018, 11/24/2018    Influenza Split High Dose Preservative Free IM 10/10/2014, 10/13/2015, 10/25/2016, 11/01/2018    Influenza TIV (IM) 10/08/2013    Pneumococcal Polysaccharide PPV23 10/12/2010    Zoster 05/31/2017      Medicare Screening Tests and Risk Assessments:     Donald Cazares is here for her Subsequent Wellness visit  Health Risk Assessment:  Patient rates overall health as very good  Patient feels that their physical health rating is Same  Eyesight was rated as Same  Hearing was rated as Same  Patient feels that their emotional and mental health rating is Same  Pain experienced by patient in the last 7 days has been None  Patient states that she has experienced no weight loss or gain in last 6 months  Emotional/Mental Health:  Patient has not been feeling nervous/anxious  PHQ-9 Depression Screening:    Frequency of the following problems over the past two weeks:      1  Little interest or pleasure in doing things: 0 - not at all      2  Feeling down, depressed, or hopeless: 0 - not at all  PHQ-2 Score: 0          Broken Bones/Falls: Fall Risk Assessment:    In the past year, patient has experienced: No history of falling in past year          Bladder/Bowel:  Patient has not leaked urine accidently in the last six months  Patient reports no loss of bowel control  Immunizations:  Patient has had a flu vaccination within the last year  Patient has not received a pneumonia shot  Patient has not received a shingles shot  Patient has not received tetanus/diphtheria shot  Home Safety:  Patient does not have trouble with stairs inside or outside of their home  Patient currently reports that there are no safety hazards present in home, working smoke alarms, working carbon monoxide detectors  Preventative Screenings:   Breast cancer screening performed, colon cancer screen completed, cholesterol screen completed, glaucoma eye exam completed,     Nutrition:  Current diet: Regular and Limited junk food with servings of the following:    Medications:  Patient is currently taking over-the-counter supplements  List of OTC medications includes: vit d  Patient is able to manage medications  Lifestyle Choices:  Patient reports no tobacco use    Patient has not smoked or used tobacco in the past   Patient reports no alcohol use  Patient drives a vehicle  Patient wears seat belt  Current level of exercise of physical activity described by patient as: limited activity  Activities of Daily Living:  Can get out of bed by his or her self, able to dress self, able to make own meals, able to do own shopping, able to bathe self, can do own laundry/housekeeping, can manage own money, pay bills and track expenses    Previous Hospitalizations:  No hospitalization or ED visit in past 12 months        Advanced Directives:  Patient has decided on a power of   Patient has spoken to designated power of   Patient has not completed advanced directive  Preventative Screening/Counseling:      Cardiovascular:      General: Risks and Benefits Discussed and Screening Current      Counseling: Healthy Diet and Improve Exercise Tolerance          Diabetes:      General: Risks and Benefits Discussed and Screening Current      Counseling: Improve Physical Activity          Colorectal Cancer:      General: Risks and Benefits Discussed and Screening Current      Counseling: high fiber diet          Breast Cancer:      General: Risks and Benefits Discussed and Screening Current          Cervical Cancer:      General: Screening Not Indicated          Osteoporosis:      General: Risks and Benefits Discussed and Screening Current          AAA:      General: Screening Not Indicated          Glaucoma:      General: Risks and Benefits Discussed and Screening Current          HIV:      General: Screening Not Indicated          Hepatitis C:      General: Screening Not Indicated        Advanced Directives:   Patient has no living will for healthcare, does not have durable POA for healthcare, patient does not have an advanced directive  Information on ACP and/or AD not provided  5 wishes given  End of life assessment reviewed with patient       Immunizations:      Influenza: Risks & Benefits Discussed and Influenza UTD This Year      Pneumococcal: Patient Declines      Shingrix: Patient Declines      Hepatitis B (Low risk patients): Series Not Indicated      Zostavax: Patient Declines      TD: Td Vaccine UTD      TDAP: Tdap Vaccine UTD      Other Preventative Counseling (Non-Medicare):   Fall Prevention, Increase physical activity, Skin self-exam and Sunscreen use

## 2019-07-29 NOTE — PATIENT INSTRUCTIONS
Obesity   AMBULATORY CARE:   Obesity  is when your body mass index (BMI) is greater than 30  Your healthcare provider will use your height and weight to measure your BMI  The risks of obesity include  many health problems, such as injuries or physical disability  You may need tests to check for the following:  · Diabetes     · High blood pressure or high cholesterol     · Heart disease     · Gallbladder or liver disease     · Cancer of the colon, breast, prostate, liver, or kidney     · Sleep apnea     · Arthritis or gout  Seek care immediately if:   · You have a severe headache, confusion, or difficulty speaking  · You have weakness on one side of your body  · You have chest pain, sweating, or shortness of breath  Contact your healthcare provider if:   · You have symptoms of gallbladder or liver disease, such as pain in your upper abdomen  · You have knee or hip pain and discomfort while walking  · You have symptoms of diabetes, such as intense hunger and thirst, and frequent urination  · You have symptoms of sleep apnea, such as snoring or daytime sleepiness  · You have questions or concerns about your condition or care  Treatment for obesity  focuses on helping you lose weight to improve your health  Even a small decrease in BMI can reduce the risk for many health problems  Your healthcare provider will help you set a weight-loss goal   · Lifestyle changes  are the first step in treating obesity  These include making healthy food choices and getting regular physical activity  Your healthcare provider may suggest a weight-loss program that involves coaching, education, and therapy  · Medicine  may help you lose weight when it is used with a healthy diet and physical activity  · Surgery  can help you lose weight if you are very obese and have other health problems  There are several types of weight-loss surgery  Ask your healthcare provider for more information    Be successful losing weight:   · Set small, realistic goals  An example of a small goal is to walk for 20 minutes 5 days a week  Anther goal is to lose 5% of your body weight  · Tell friends, family members, and coworkers about your goals  and ask for their support  Ask a friend to lose weight with you, or join a weight-loss support group  · Identify foods or triggers that may cause you to overeat , and find ways to avoid them  Remove tempting high-calorie foods from your home and workplace  Place a bowl of fresh fruit on your kitchen counter  If stress causes you to eat, then find other ways to cope with stress  · Keep a diary to track what you eat and drink  Also write down how many minutes of physical activity you do each day  Weigh yourself once a week and record it in your diary  Eating changes: You will need to eat 500 to 1,000 fewer calories each day than you currently eat to lose 1 to 2 pounds a week  The following changes will help you cut calories:  · Eat smaller portions  Use small plates, no larger than 9 inches in diameter  Fill your plate half full of fruits and vegetables  Measure your food using measuring cups until you know what a serving size looks like  · Eat 3 meals and 1 or 2 snacks each day  Plan your meals in advance  [employer] and eat at home most of the time  Eat slowly  · Eat fruits and vegetables at every meal   They are low in calories and high in fiber, which makes you feel full  Do not add butter, margarine, or cream sauce to vegetables  Use herbs to season steamed vegetables  · Eat less fat and fewer fried foods  Eat more baked or grilled chicken and fish  These protein sources are lower in calories and fat than red meat  Limit fast food  Dress your salads with olive oil and vinegar instead of bottled dressing  · Limit the amount of sugar you eat  Do not drink sugary beverages  Limit alcohol  Activity changes:  Physical activity is good for your body in many ways   It helps you burn calories and build strong muscles  It decreases stress and depression, and improves your mood  It can also help you sleep better  Talk to your healthcare provider before you begin an exercise program   · Exercise for at least 30 minutes 5 days a week  Start slowly  Set aside time each day for physical activity that you enjoy and that is convenient for you  It is best to do both weight training and an activity that increases your heart rate, such as walking, bicycling, or swimming  · Find ways to be more active  Do yard work and housecleaning  Walk up the stairs instead of using elevators  Spend your leisure time going to events that require walking, such as outdoor festivals or fairs  This extra physical activity can help you lose weight and keep it off  Follow up with your healthcare provider as directed: You may need to meet with a dietitian  Write down your questions so you remember to ask them during your visits  © 2017 [address] Information is for End User's use only and may not be sold, redistributed or otherwise used for commercial purposes  All illustrations and images included in CareNotes® are the copyrighted property of [employer]  or Benny Crowder  The above information is an  only  It is not intended as medical advice for individual conditions or treatments  Talk to your doctor, nurse or pharmacist before following any medical regimen to see if it is safe and effective for you  Urinary Incontinence   WHAT YOU NEED TO KNOW:   What is urinary incontinence? Urinary incontinence (UI) is when you lose control of your bladder  What causes UI? UI occurs because your bladder cannot store or empty urine properly  The following are the most common types of UI:  · Stress incontinence  is when you leak urine due to increased bladder pressure  This may happen when you cough, sneeze, or exercise       · Urge incontinence  is when you feel the need to urinate right away and leak urine accidentally  · Mixed incontinence  is when you have both stress and urge UI  What are the signs and symptoms of UI?   · You feel like your bladder does not empty completely when you urinate  · You urinate often and need to urinate immediately  · You leak urine when you sleep, or you wake up with the urge to urinate  · You leak urine when you cough, sneeze, exercise, or laugh  How is UI diagnosed? Your healthcare provider will ask how often you leak urine and whether you have stress or urge symptoms  Tell him which medicines you take, how often you urinate, and how much liquid you drink each day  You may need any of the following tests:  · Urine tests  may show infection or kidney function  · A pelvic exam  may be done to check for blockages  A pelvic exam will also show if your bladder, uterus, or other organs have moved out of place  · An x-ray, ultrasound, or CT  may show problems with parts of your urinary system  You may be given contrast liquid to help your organs show up better in the pictures  Tell the healthcare provider if you have ever had an allergic reaction to contrast liquid  Do not enter the MRI room with anything metal  Metal can cause serious injury  Tell the healthcare provider if you have any metal in or on your body  · A bladder scan  will show how much urine is left in your bladder after you urinate  You will be asked to urinate and then healthcare providers will use a small ultrasound machine to check the urine left in your bladder  · Cystometry  is used to check the function of your urinary system  Your healthcare provider checks the pressure in your bladder while filling it with fluid  Your bladder pressure may also be tested when your bladder is full and while you urinate  How is UI treated? · Medicines  can help strengthen your bladder control      · Electrical stimulation  is used to send a small amount of electrical energy to your pelvic floor muscles  This helps control your bladder function  Electrodes may be placed outside your body or in your rectum  For women, the electrodes may be placed in the vagina  · A bulking agent  may be injected into the wall of your urethra to make it thicker  This helps keep your urethra closed and decreases urine leakage  · Devices  such as a clamp, pessary, or tampon may help stop urine leaks  Ask your healthcare provider for more information about these and other devices  · Surgery  may be needed if other treatments do not work  Several types of surgery can help improve your bladder control  Ask your healthcare provider for more information about the surgery you may need  How can I manage my symptoms? · Do pelvic muscle exercises often  Your pelvic muscles help you stop urinating  Squeeze these muscles tight for 5 seconds, then relax for 5 seconds  Gradually work up to squeezing for 10 seconds  Do 3 sets of 15 repetitions a day, or as directed  This will help strengthen your pelvic muscles and improve bladder control  · A catheter  may be used to help empty your bladder  A catheter is a tiny, plastic tube that is put into your bladder to drain your urine  Your healthcare provider may tell you to use a catheter to prevent your bladder from getting too full and leaking urine  · Keep a UI record  Write down how often you leak urine and how much you leak  Make a note of what you were doing when you leaked urine  · Train your bladder  Go to the bathroom at set times, such as every 2 hours, even if you do not feel the urge to go  You can also try to hold your urine when you feel the urge to go  For example, hold your urine for 5 minutes when you feel the urge to go  As that becomes easier, hold your urine for 10 minutes  · Drink liquids as directed  Ask your healthcare provider how much liquid to drink each day and which liquids are best for you   You may need to limit the amount of liquid you drink to help control your urine leakage  Limit or do not have drinks that contain caffeine or alcohol  Do not drink any liquid right before you go to bed  · Prevent constipation  Eat a variety of high-fiber foods  Good examples are high-fiber cereals, beans, vegetables, and whole-grain breads  Prune juice may help make your bowel movement softer  Walking is the best way to trigger your intestines to have a bowel movement  · Exercise regularly and maintain a healthy weight  Ask your healthcare provider how much you should weigh and about the best exercise plan for you  Weight loss and exercise will decrease pressure on your bladder and help you control your leakage  Ask him to help you create a weight loss plan if you are overweight  When should I seek immediate care? · You have severe pain  · You are confused or cannot think clearly  When should I contact my healthcare provider? · You have a fever  · You see blood in your urine  · You have pain when you urinate  · You have new or worse pain, even after treatment  · Your mouth feels dry or you have vision changes  · Your urine is cloudy or smells bad  · You have questions or concerns about your condition or care  CARE AGREEMENT:   You have the right to help plan your care  Learn about your health condition and how it may be treated  Discuss treatment options with your caregivers to decide what care you want to receive  You always have the right to refuse treatment  The above information is an  only  It is not intended as medical advice for individual conditions or treatments  Talk to your doctor, nurse or pharmacist before following any medical regimen to see if it is safe and effective for you  © 2017 [address] [employer] Information is for End User's use only and may not be sold, redistributed or otherwise used for commercial purposes   All illustrations and images included in CareNotes® are the copyrighted property of [employer] M [employer] Inc  or Benny Crowder  Cigarette Smoking and Your Health   AMBULATORY CARE:   Risks to your health if you smoke:  Nicotine and other chemicals found in tobacco damage every cell in your body  Even if you are a light smoker, you have an increased risk for cancer, heart disease, and lung disease  If you are pregnant or have diabetes, smoking increases your risk for complications  Benefits to your health if you stop smoking:   · You decrease respiratory symptoms such as coughing, wheezing, and shortness of breath  · You reduce your risk for cancers of the lung, mouth, throat, kidney, bladder, pancreas, stomach, and cervix  If you already have cancer, you increase the benefits of chemotherapy  You also reduce your risk for cancer returning or a second cancer from developing  · You reduce your risk for heart disease, blood clots, heart attack, and stroke  · You reduce your risk for lung infections, and diseases such as pneumonia, asthma, chronic bronchitis, and emphysema  · Your circulation improves  More oxygen can be delivered to your body  If you have diabetes, you lower your risk for complications, such as kidney, artery, and eye diseases  You also lower your risk for nerve damage  Nerve damage can lead to amputations, poor vision, and blindness  · You improve your body's ability to heal and to fight infections  Benefits to the health of others if you stop smoking:  Tobacco is harmful to nonsmokers who breathe in your secondhand smoke  The following are ways the health of others around you may improve when you stop smoking:  · You lower the risks for lung cancer and heart disease in nonsmoking adults  · If you are pregnant, you lower the risk for miscarriage, early delivery, low birth weight, and stillbirth  You also lower your baby's risk for SIDS, obesity, developmental delay, and neurobehavioral problems, such as ADHD  · If you have children, you lower their risk for ear infections, colds, pneumonia, bronchitis, and asthma  For more information and support to stop smoking:   · [employer]  gov  Phone: 2- 687 - 334-2832  Web Address: www smokefree  gov  Follow up with your healthcare provider as directed:  Write down your questions so you remember to ask them during your visits  © 2017 [address] Information is for End User's use only and may not be sold, redistributed or otherwise used for commercial purposes  All illustrations and images included in CareNotes® are the copyrighted property of [employer] M [employer]  or Benny Crowder  The above information is an  only  It is not intended as medical advice for individual conditions or treatments  Talk to your doctor, nurse or pharmacist before following any medical regimen to see if it is safe and effective for you  Fall Prevention   AMBULATORY CARE:   Fall prevention  includes ways to make your home and other areas safer  It also includes ways you can move more carefully to prevent a fall  Health conditions that cause changes in your blood pressure, vision, or muscle strength and coordination may increase your risk for falls  Medicines may also increase your risk for falls if they make you dizzy, weak, or sleepy  Call 911 or have someone else call if:   · You have fallen and are unconscious  · You have fallen and cannot move part of your body  Contact your healthcare provider if:   · You have fallen and have pain or a headache  · You have questions or concerns about your condition or care  Fall prevention tips:   · Stand or sit up slowly  This may help you keep your balance and prevent falls  · Use assistive devices as directed  Your healthcare provider may suggest that you use a cane or walker to help you keep your balance  You may need to have grab bars put in your bathroom near the toilet or in the shower      · Wear shoes that fit well and have soles that   Wear shoes both inside and outside  Use slippers with good   Do not wear shoes with high heels  · Wear a personal alarm  This is a device that allows you to call 911 if you fall and need help  Ask your healthcare provider for more information  · Stay active  Exercise can help strengthen your muscles and improve your balance  Your healthcare provider may recommend water aerobics or walking  He or she may also recommend physical therapy to improve your coordination  Never start an exercise program without talking to your healthcare provider first      · Manage your medical conditions  Keep all appointments with your healthcare providers  Visit your eye doctor as directed  Home safety tips:   · Add items to prevent falls in the bathroom  Put nonslip strips on your bath or shower floor to prevent you from slipping  Use a bath mat if you do not have carpet in the bathroom  This will prevent you from falling when you step out of the bath or shower  Use a shower seat so you do not need to stand while you shower  Sit on the toilet or a chair in your bathroom to dry yourself and put on clothing  This will prevent you from losing your balance from drying or dressing yourself while you are standing  · Keep paths clear  Remove books, shoes, and other objects from walkways and stairs  Place cords for telephones and lamps out of the way so that you do not need to walk over them  Tape them down if you cannot move them  Remove small rugs  If you cannot remove a rug, secure it with double-sided tape  This will prevent you from tripping  · Install bright lights in your home  Use night lights to help light paths to the bathroom or kitchen  Always turn on the light before you start walking  · Keep items you use often on shelves within reach  Do not use a step stool to help you reach an item  · Paint or place reflective tape on the edges of your stairs    This will help you see the stairs better  Follow up with your healthcare provider as directed:  Write down your questions so you remember to ask them during your visits  © 2017 [address] [employer] Information is for End User's use only and may not be sold, redistributed or otherwise used for commercial purposes  All illustrations and images included in CareNotes® are the copyrighted property of [employer]  or Benny Crowder  The above information is an  only  It is not intended as medical advice for individual conditions or treatments  Talk to your doctor, nurse or pharmacist before following any medical regimen to see if it is safe and effective for you  Advance Directives   WHAT YOU NEED TO KNOW:   What are advance directives? Advance directives are legal documents that state your wishes and plans for medical care  These plans are made ahead of time in case you lose your ability to make decisions for yourself  Advance directives can apply to any medical decision, such as the treatments you want, and if you want to donate organs  What are the types of advance directives? There are many types of advance directives, and each state has rules about how to use them  You may choose a combination of any of the following:  · Living will: This is a written record of the treatment you want  You can also choose which treatments you do not want, which to limit, and which to stop at a certain time  This includes surgery, medicine, IV fluid, and tube feedings  · Durable power of  for healthcare [facility]): This is a written record that states who you want to make healthcare choices for you when you are unable to make them for yourself  This person, called a proxy, is usually a family member or a friend  You may choose more than 1 proxy  · Do not resuscitate (DNR) order:  A DNR order is used in case your heart stops beating or you stop breathing   It is a request not to have certain forms of treatment, such as CPR  A DNR order may be included in other types of advance directives  · Medical directive: This covers the care that you want if you are in a coma, near death, or unable to make decisions for yourself  You can list the treatments you want for each condition  Treatment may include pain medicine, surgery, blood transfusions, dialysis, IV or tube feedings, and a ventilator (breathing machine)  · Values history: This document has questions about your views, beliefs, and how you feel and think about life  This information can help others choose the care that you would choose  Why are advance directives important? An advance directive helps you control your care  Although spoken wishes may be used, it is better to have your wishes written down  Spoken wishes can be misunderstood, or not followed  Treatments may be given even if you do not want them  An advance directive may make it easier for your family to make difficult choices about your care  How do I decide what to put in my advance directives? · Make informed decisions:  Make sure you fully understand treatments or care you may receive  Think about the benefits and problems your decisions could cause for you or your family  Talk to healthcare providers if you have concerns or questions before you write down your wishes  You may also want to talk with your [employer] or , or a   Check your state laws to make sure that what you put in your advance directive is legal      · Sign all forms:  Sign and date your advance directive when you have finished  You may also need 2 witnesses to sign the forms  Witnesses cannot be your doctor or his staff, your spouse, heirs or beneficiaries, people you owe money to, or your chosen proxy  Talk to your family, proxy, and healthcare providers about your advance directive  Give each person a copy, and keep one for yourself in a place you can get to easily   Do not keep it hidden or locked away  · Review and revise your plans: You can revise your advance directive at any time, as long as you are able to make decisions  Review your plan every year, and when there are changes in your life, or your health  When you make changes, let your family, proxy, and healthcare providers know  Give each a new copy  Where can I find more information? · [employer]  [address] , [place] [address]  Phone: 3- 245 - [phone]  Phone: 8- 496 - [phone]  Web Address: http://www  aafp org  · [address] [facility])  [address] [address] [facility], [address]  Phone: [phone] - [phone]  Phone: [phone]  Web Address: Steven tavarez  CARE AGREEMENT:   You have the right to help plan your care  To help with this plan, you must learn about your health condition and treatment options  You must also learn about advance directives and how they are used  Work with your healthcare providers to decide what care will be used to treat you  You always have the right to refuse treatment  The above information is an  only  It is not intended as medical advice for individual conditions or treatments  Talk to your doctor, nurse or pharmacist before following any medical regimen to see if it is safe and effective for you  © 2017 [address] Information is for End User's use only and may not be sold, redistributed or otherwise used for commercial purposes  All illustrations and images included in CareNotes® are the copyrighted property of [employer]  or Benny Crowder

## 2019-07-29 NOTE — PROGRESS NOTES
Assessment/Plan:           Problem List Items Addressed This Visit        Digestive    Acid reflux disease    Relevant Medications    omeprazole (PriLOSEC) 40 MG capsule       Cardiovascular and Mediastinum    Essential hypertension - Primary    Relevant Medications    losartan (COZAAR) 50 mg tablet       Other    Hyperlipidemia     Monitor for now  Low fat diet             Other Visit Diagnoses     Encounter for screening mammogram for malignant neoplasm of breast        Relevant Orders    Mammo screening bilateral w cad            Subjective:      Patient ID: Marta Be is a 70 y o  female  Here for f/u to chronic medical conditions  Also medicare wellness  Feels well  Needs refills of meds  Unable to get pneumonia shot due to allergic reaction in past        The following portions of the patient's history were reviewed and updated as appropriate: allergies, current medications, past family history, past medical history, past social history, past surgical history and problem list     Review of Systems   Constitutional: Negative for fatigue and fever  HENT: Negative for congestion, postnasal drip and rhinorrhea  Eyes: Negative for photophobia and visual disturbance  Respiratory: Negative for cough, shortness of breath and wheezing  Cardiovascular: Negative for chest pain and palpitations  Gastrointestinal: Negative for constipation, diarrhea, nausea and vomiting  Endocrine: Negative for polydipsia, polyphagia and polyuria  Genitourinary: Negative for dysuria, flank pain and urgency  Musculoskeletal: Negative for back pain and myalgias  Skin: Negative for rash  Neurological: Negative for dizziness, light-headedness and headaches  Psychiatric/Behavioral: Negative for confusion and sleep disturbance  The patient is not nervous/anxious            Objective:      /68 (BP Location: Right arm, Patient Position: Sitting, Cuff Size: Standard)   Pulse 97   Resp 12   Ht 5' 3" (1 6 m) Wt 56 8 kg (125 lb 3 2 oz)   LMP 07/22/1986 (LMP Unknown) Comment: hysterectomy 30 years ago  SpO2 95%   BMI 22 18 kg/m²     Family History   Problem Relation Age of Onset   [de-identified] Cancer Sister     Colon cancer Sister     Diabetes Sister    [de-identified] Melanoma Mother     Cirrhosis Father         alcoholic    Stroke Father         CVA (cerebral vascular accident)    Arrhythmia Father         pacemaker placement     Past Medical History:   Diagnosis Date    GERD (gastroesophageal reflux disease)     Hyperlipidemia     Hypertension     Spinal stenosis     Trapezius muscle spasm     last assessed - [id number]     Social History     Socioeconomic History    Marital status: /Civil Union     Spouse name: Not on file    Number of children: 2    Years of education: Not on file    Highest education level: Not on file   Occupational History    Occupation: Retired -  - [address] Financial resource strain: Not on file    Food insecurity:     Worry: Not on file     Inability: Not on file   [employer] needs:     Medical: Not on file     Non-medical: Not on file   Tobacco Use    Smoking status: Former Smoker     Packs/day: 0 50     Last attempt to quit: 7/1/2016     Years since quitting: 3 0    Smokeless tobacco: Former User    Tobacco comment: Quit June 2014 - 1/2 ppd for 40 years per [employer]   Substance and Sexual Activity    Alcohol use: No     Comment: Consumes alcohol occasionally per [employer]    Drug use: No    Sexual activity: Not on file   Lifestyle    Physical activity:     Days per week: Not on file     Minutes per session: Not on file    Stress: Not on file   Relationships    Social connections:     Talks on phone: Not on file     Gets together: Not on file     Attends Mandaeism service: Not on file     Active member of club or organization: Not on file     Attends meetings of clubs or organizations: Not on file     Relationship status: Not on file    Intimate partner violence:     Fear of current or ex partner: Not on file     Emotionally abused: Not on file     Physically abused: Not on file     Forced sexual activity: Not on file   Other Topics Concern    Not on file   Social History Narrative    Two children - [address] 68       Current Outpatient Medications:     albuterol (PROAIR HFA) 90 mcg/act inhaler, Inhale 2 puffs every 4 (four) hours as needed for wheezing (Patient not taking: Reported on 5/25/2019), Disp: 1 Inhaler, Rfl: 0    Cholecalciferol (VITAMIN D) 2000 UNITS CAPS, Take by mouth , Disp: , Rfl:     Cholecalciferol (VITAMIN D3) 1000 units CAPS, Take 2 tablets by mouth daily, Disp: , Rfl:     cyanocobalamin (VITAMIN B-12) 1,000 mcg tablet, , Disp: , Rfl:     cyclobenzaprine (FLEXERIL) 10 mg tablet, Take 1 tablet (10 mg total) by mouth daily at bedtime for 5 days, Disp: 5 tablet, Rfl: 0    losartan (COZAAR) 50 mg tablet, Take 1 tablet (50 mg total) by mouth daily, Disp: 90 tablet, Rfl: 3    meloxicam (MOBIC) 7 5 mg tablet, Take 1 tablet (7 5 mg total) by mouth daily (Patient not taking: Reported on 5/25/2019), Disp: 30 tablet, Rfl: 1    omeprazole (PriLOSEC) 40 MG capsule, Take 1 capsule (40 mg total) by mouth daily, Disp: 90 capsule, Rfl: 3  Allergies   Allergen Reactions    Atorvastatin Edema    Other      Cholesterol med- name unknown and pneumonia vaccine    Pneumococcal Vaccines Edema        Physical Exam   Constitutional: She is oriented to person, place, and time  She appears well-developed and well-nourished  HENT:   Head: Normocephalic and atraumatic  Right Ear: External ear normal    Left Ear: External ear normal    Nose: Nose normal    Mouth/Throat: Oropharynx is clear and moist    Eyes: Conjunctivae are normal    Neck: Normal range of motion  Neck supple  Cardiovascular: Normal rate, regular rhythm, normal heart sounds and intact distal pulses     Pulmonary/Chest: Effort normal and breath sounds normal    Abdominal: Soft  Bowel sounds are normal    Musculoskeletal: Normal range of motion  Neurological: She is alert and oriented to person, place, and time  Skin: Skin is warm and dry  Capillary refill takes less than 2 seconds  Psychiatric: She has a normal mood and affect  Her behavior is normal  Judgment and thought content normal    Nursing note and vitals reviewed          Recent Results (from the past 504 hour(s))   Lipid Panel with Direct LDL reflex    Collection Time: 07/15/19  8:17 AM   Result Value Ref Range    Total Cholesterol 208 (H) <200 mg/dL    HDL 59 >50 mg/dL    Triglycerides 125 <150 mg/dL    LDL Direct 125 (H) mg/dL (calc)    Chol HDLC Ratio 3 5 <5 0 (calc)    Non-HDL Cholesterol 149 (H) <130 mg/dL (calc)   Comprehensive metabolic panel    Collection Time: 07/15/19  8:17 AM   Result Value Ref Range    Glucose, Random 86 65 - 99 mg/dL    BUN 15 7 - 25 mg/dL    Creatinine 0 78 0 60 - 0 93 mg/dL    eGFR Non  74 > OR = 60 mL/min/1 73m2    eGFR  86 > OR = 60 mL/min/1 73m2    SL AMB BUN/CREATININE RATIO NOT APPLICABLE 6 - 22 (calc)    Sodium 139 135 - 146 mmol/L    Potassium 4 3 3 5 - 5 3 mmol/L    Chloride 103 98 - 110 mmol/L    CO2 30 20 - 32 mmol/L    SL AMB CALCIUM 9 7 8 6 - 10 4 mg/dL    Protein, Total 6 5 6 1 - 8 1 g/dL    Albumin 4 0 3 6 - 5 1 g/dL    Globulin 2 5 1 9 - 3 7 g/dL (calc)    Albumin/Globulin Ratio 1 6 1 0 - 2 5 (calc)    TOTAL BILIRUBIN 0 5 0 2 - 1 2 mg/dL    Alkaline Phosphatase 95 33 - 130 U/L    AST 15 10 - 35 U/L    ALT 11 6 - 29 U/L   CBC and differential    Collection Time: 07/15/19  8:17 AM   Result Value Ref Range    White Blood Cell Count 4 3 3 8 - 10 8 Thousand/uL    Red Blood Cell Count 4 52 3 80 - 5 10 Million/uL    Hemoglobin 14 4 11 7 - 15 5 g/dL    HCT 43 4 35 0 - 45 0 %    MCV 96 0 80 0 - 100 0 fL    MCH 31 9 27 0 - 33 0 pg    MCHC 33 2 32 0 - 36 0 g/dL    RDW 13 1 11 0 - 15 0 %    Platelet Count 781 993 - 400 Thousand/uL    SL AMB MPV 10 5 7 5 - 12 5 fL    Neutrophils (Absolute) 2,662 1,500 - 7,800 cells/uL    Lymphocytes (Absolute) 1,269 850 - 3,900 cells/uL    Monocytes (Absolute) 280 200 - 950 cells/uL    Eosinophils (Absolute) 69 15 - 500 cells/uL    Basophils ABS 22 0 - 200 cells/uL    Neutrophils 61 9 %    Lymphocytes 29 5 %    Monocytes 6 5 %    Eosinophils 1 6 %    Basophils PCT 0 5 %

## 2019-10-17 ENCOUNTER — HOSPITAL ENCOUNTER (OUTPATIENT)
Dept: RADIOLOGY | Age: 79
Discharge: HOME | End: 2019-10-17
Payer: COMMERCIAL

## 2019-10-17 VITALS — HEIGHT: 63 IN | WEIGHT: 135 LBS | BODY MASS INDEX: 23.92 KG/M2

## 2019-10-17 DIAGNOSIS — Z12.31 ENCOUNTER FOR SCREENING MAMMOGRAM FOR MALIGNANT NEOPLASM OF BREAST: ICD-10-CM

## 2019-10-17 PROCEDURE — 77067 SCR MAMMO BI INCL CAD: CPT

## 2020-01-30 ENCOUNTER — OFFICE VISIT (OUTPATIENT)
Dept: FAMILY MEDICINE CLINIC | Facility: CLINIC | Age: 80
End: 2020-01-30
Payer: COMMERCIAL

## 2020-01-30 VITALS
RESPIRATION RATE: 14 BRPM | SYSTOLIC BLOOD PRESSURE: 144 MMHG | DIASTOLIC BLOOD PRESSURE: 60 MMHG | WEIGHT: 126.8 LBS | BODY MASS INDEX: 22.47 KG/M2 | TEMPERATURE: 97.1 F | HEIGHT: 63 IN | HEART RATE: 92 BPM | OXYGEN SATURATION: 95 %

## 2020-01-30 DIAGNOSIS — I10 ESSENTIAL HYPERTENSION: ICD-10-CM

## 2020-01-30 DIAGNOSIS — E78.2 MIXED HYPERLIPIDEMIA: ICD-10-CM

## 2020-01-30 DIAGNOSIS — E04.1 THYROID NODULE: ICD-10-CM

## 2020-01-30 DIAGNOSIS — K21.9 GASTROESOPHAGEAL REFLUX DISEASE WITHOUT ESOPHAGITIS: Primary | ICD-10-CM

## 2020-01-30 PROCEDURE — 1101F PT FALLS ASSESS-DOCD LE1/YR: CPT | Performed by: NURSE PRACTITIONER

## 2020-01-30 PROCEDURE — 99214 OFFICE O/P EST MOD 30 MIN: CPT | Performed by: NURSE PRACTITIONER

## 2020-01-30 PROCEDURE — 3725F SCREEN DEPRESSION PERFORMED: CPT | Performed by: NURSE PRACTITIONER

## 2020-01-30 PROCEDURE — 1160F RVW MEDS BY RX/DR IN RCRD: CPT | Performed by: NURSE PRACTITIONER

## 2020-01-30 PROCEDURE — 3288F FALL RISK ASSESSMENT DOCD: CPT | Performed by: NURSE PRACTITIONER

## 2020-01-30 PROCEDURE — 1036F TOBACCO NON-USER: CPT | Performed by: NURSE PRACTITIONER

## 2020-01-30 NOTE — PROGRESS NOTES
Assessment/Plan:           Problem List Items Addressed This Visit        Digestive    Acid reflux disease - Primary    Relevant Orders    Comprehensive metabolic panel    CBC and differential    TSH, 3rd generation with Free T4 reflex    Lipid Panel with Direct LDL reflex    Comprehensive metabolic panel    CBC and differential    Lipid Panel with Direct LDL reflex       Endocrine    Thyroid nodule    Relevant Orders    Comprehensive metabolic panel    CBC and differential    TSH, 3rd generation with Free T4 reflex    Lipid Panel with Direct LDL reflex    Comprehensive metabolic panel    CBC and differential    Lipid Panel with Direct LDL reflex       Cardiovascular and Mediastinum    Essential hypertension    Relevant Orders    Comprehensive metabolic panel    CBC and differential    TSH, 3rd generation with Free T4 reflex    Lipid Panel with Direct LDL reflex    Comprehensive metabolic panel    CBC and differential    Lipid Panel with Direct LDL reflex       Other    Hyperlipidemia    Relevant Orders    Comprehensive metabolic panel    CBC and differential    TSH, 3rd generation with Free T4 reflex    Lipid Panel with Direct LDL reflex    Comprehensive metabolic panel    CBC and differential    Lipid Panel with Direct LDL reflex            Subjective:      Patient ID: Isabel Barrett is a 70 y o  female  Here for f/u to chronic medical conditions  Feeling well  bp stable  Tolerating cholesterol meds without side affects  To get labs  gerd under control  Due for colonoscopy this year  No longer does pap/pelvic exams  Will do mammograms every other year now        The following portions of the patient's history were reviewed and updated as appropriate: allergies, current medications, past family history, past medical history, past social history, past surgical history and problem list     Review of Systems   Constitutional: Negative for fatigue and fever  HENT: Negative for congestion and postnasal drip  Eyes: Negative for photophobia and visual disturbance  Respiratory: Negative for cough and shortness of breath  Cardiovascular: Negative for chest pain and palpitations  Gastrointestinal: Negative for constipation and diarrhea  Genitourinary: Negative for dysuria and frequency  Musculoskeletal: Negative for arthralgias and myalgias  Skin: Negative for rash  Neurological: Negative for dizziness and headaches  Hematological: Negative for adenopathy  Psychiatric/Behavioral: Negative for dysphoric mood  The patient is not nervous/anxious            Objective:      /60   Pulse 92   Temp (!) 97 1 °F (36 2 °C)   Resp 14   Ht 5' 3" (1 6 m)   Wt 57 5 kg (126 lb 12 8 oz)   LMP 07/22/1986 (LMP Unknown) Comment: hysterectomy 30 years ago  SpO2 95%   BMI 22 46 kg/m²     Family History   Problem Relation Age of Onset    Cancer Sister     Colon cancer Sister 71    Diabetes Sister     Melanoma Mother     Cirrhosis Father         alcoholic    Stroke Father         CVA (cerebral vascular accident)    Arrhythmia Father         pacemaker placement    No Known Problems Maternal Grandmother     No Known Problems Maternal Grandfather     No Known Problems Paternal Grandmother     No Known Problems Paternal Grandfather     No Known Problems Sister     No Known Problems Sister     No Known Problems Sister     No Known Problems Sister     No Known Problems Maternal Aunt     No Known Problems Maternal Aunt     No Known Problems Maternal Aunt     No Known Problems Maternal Aunt     No Known Problems Maternal Aunt     No Known Problems Maternal Aunt     No Known Problems Paternal Aunt     No Known Problems Paternal Aunt     No Known Problems Paternal Aunt      Past Medical History:   Diagnosis Date    GERD (gastroesophageal reflux disease)     Hyperlipidemia     Hypertension     Spinal stenosis     Trapezius muscle spasm     last assessed - [id number]     Social History Socioeconomic History    Marital status: /Civil Union     Spouse name: Not on file    Number of children: 2    Years of education: Not on file    Highest education level: Not on file   Occupational History    Occupation: Retired -  - [address] resource strain: Not on file    Food insecurity:     Worry: Not on file     Inability: Not on file   [employer] needs:     Medical: Not on file     Non-medical: Not on file   Tobacco Use    Smoking status: Former Smoker     Packs/day: 0 50     Last attempt to quit: 7/1/2016     Years since quitting: 3 5    Smokeless tobacco: Former User    Tobacco comment: Quit June 2014 - 1/2 ppd for 40 years per [employer]   Substance and Sexual Activity    Alcohol use: No     Comment: Consumes alcohol occasionally per [employer]    Drug use: No    Sexual activity: Not on file   Lifestyle    Physical activity:     Days per week: Not on file     Minutes per session: Not on file    Stress: Not on file   Relationships    Social connections:     Talks on phone: Not on file     Gets together: Not on file     Attends Religion service: Not on file     Active member of club or organization: Not on file     Attends meetings of clubs or organizations: Not on file     Relationship status: Not on file    Intimate partner violence:     Fear of current or ex partner: Not on file     Emotionally abused: Not on file     Physically abused: Not on file     Forced sexual activity: Not on file   Other Topics Concern    Not on file   Social History Narrative    Two children - [address]       Current Outpatient Medications:     Cholecalciferol (VITAMIN D) 2000 UNITS CAPS, Take by mouth , Disp: , Rfl:     losartan (COZAAR) 50 mg tablet, Take 1 tablet (50 mg total) by mouth daily, Disp: 90 tablet, Rfl: 3    omeprazole (PriLOSEC) 40 MG capsule, Take 1 capsule (40 mg total) by mouth daily, Disp: 90 capsule, Rfl: 3    albuterol [facility] HFA) 90 mcg/act inhaler, Inhale 2 puffs every 4 (four) hours as needed for wheezing (Patient not taking: Reported on 5/25/2019), Disp: 1 Inhaler, Rfl: 0    meloxicam (MOBIC) 7 5 mg tablet, Take 1 tablet (7 5 mg total) by mouth daily (Patient not taking: Reported on 5/25/2019), Disp: 30 tablet, Rfl: 1  Allergies   Allergen Reactions    Atorvastatin Edema    Other      Cholesterol med- name unknown and pneumonia vaccine    Pneumococcal Vaccines Edema        Physical Exam   Constitutional: She is oriented to person, place, and time  She appears well-developed and well-nourished  HENT:   Head: Normocephalic and atraumatic  Right Ear: External ear normal    Left Ear: External ear normal    Nose: Nose normal    Mouth/Throat: Oropharynx is clear and moist    Eyes: Pupils are equal, round, and reactive to light  Conjunctivae and EOM are normal    Neck: Normal range of motion  Neck supple  No thyromegaly present  Cardiovascular: Normal rate, regular rhythm and normal heart sounds  Pulmonary/Chest: Effort normal and breath sounds normal    Abdominal: Soft  Bowel sounds are normal    Musculoskeletal: Normal range of motion  Neurological: She is alert and oriented to person, place, and time  Skin: Skin is warm and dry  Capillary refill takes less than 2 seconds  Psychiatric: She has a normal mood and affect  Her behavior is normal  Judgment and thought content normal    Nursing note and vitals reviewed  BMI Counseling: Body mass index is 22 46 kg/m²  The BMI is within normal limits

## 2020-02-11 LAB
ALBUMIN SERPL-MCNC: 4.3 G/DL (ref 3.6–5.1)
ALBUMIN/GLOB SERPL: 1.7 (CALC) (ref 1–2.5)
ALP SERPL-CCNC: 107 U/L (ref 37–153)
ALT SERPL-CCNC: 10 U/L (ref 6–29)
AST SERPL-CCNC: 15 U/L (ref 10–35)
BASOPHILS # BLD AUTO: 19 CELLS/UL (ref 0–200)
BASOPHILS NFR BLD AUTO: 0.3 %
BILIRUB SERPL-MCNC: 0.5 MG/DL (ref 0.2–1.2)
BUN SERPL-MCNC: 16 MG/DL (ref 7–25)
BUN/CREAT SERPL: NORMAL (CALC) (ref 6–22)
CALCIUM SERPL-MCNC: 9.9 MG/DL (ref 8.6–10.4)
CHLORIDE SERPL-SCNC: 102 MMOL/L (ref 98–110)
CO2 SERPL-SCNC: 29 MMOL/L (ref 20–32)
CREAT SERPL-MCNC: 0.77 MG/DL (ref 0.6–0.93)
EOSINOPHIL # BLD AUTO: 62 CELLS/UL (ref 15–500)
EOSINOPHIL NFR BLD AUTO: 1 %
ERYTHROCYTE [DISTWIDTH] IN BLOOD BY AUTOMATED COUNT: 12.4 % (ref 11–15)
GLOBULIN SER CALC-MCNC: 2.6 G/DL (CALC) (ref 1.9–3.7)
GLUCOSE SERPL-MCNC: 84 MG/DL (ref 65–99)
HCT VFR BLD AUTO: 44.7 % (ref 35–45)
HGB BLD-MCNC: 14.9 G/DL (ref 11.7–15.5)
LYMPHOCYTES # BLD AUTO: 1197 CELLS/UL (ref 850–3900)
LYMPHOCYTES NFR BLD AUTO: 19.3 %
MCH RBC QN AUTO: 32 PG (ref 27–33)
MCHC RBC AUTO-ENTMCNC: 33.3 G/DL (ref 32–36)
MCV RBC AUTO: 96.1 FL (ref 80–100)
MONOCYTES # BLD AUTO: 391 CELLS/UL (ref 200–950)
MONOCYTES NFR BLD AUTO: 6.3 %
NEUTROPHILS # BLD AUTO: 4532 CELLS/UL (ref 1500–7800)
NEUTROPHILS NFR BLD AUTO: 73.1 %
PLATELET # BLD AUTO: 269 THOUSAND/UL (ref 140–400)
PMV BLD REES-ECKER: 10.9 FL (ref 7.5–12.5)
POTASSIUM SERPL-SCNC: 4.5 MMOL/L (ref 3.5–5.3)
PROT SERPL-MCNC: 6.9 G/DL (ref 6.1–8.1)
RBC # BLD AUTO: 4.65 MILLION/UL (ref 3.8–5.1)
SL AMB EGFR AFRICAN AMERICAN: 86 ML/MIN/1.73M2
SL AMB EGFR NON AFRICAN AMERICAN: 74 ML/MIN/1.73M2
SODIUM SERPL-SCNC: 140 MMOL/L (ref 135–146)
TSH SERPL-ACNC: 1.9 MIU/L (ref 0.4–4.5)
WBC # BLD AUTO: 6.2 THOUSAND/UL (ref 3.8–10.8)

## 2020-07-17 DIAGNOSIS — I10 ESSENTIAL HYPERTENSION: ICD-10-CM

## 2020-07-17 DIAGNOSIS — K21.9 GASTROESOPHAGEAL REFLUX DISEASE WITHOUT ESOPHAGITIS: ICD-10-CM

## 2020-07-17 RX ORDER — OMEPRAZOLE 40 MG/1
CAPSULE, DELAYED RELEASE ORAL
Qty: 90 CAPSULE | Refills: 3 | Status: SHIPPED | OUTPATIENT
Start: 2020-07-17 | End: 2021-07-15

## 2020-07-17 RX ORDER — LOSARTAN POTASSIUM 50 MG/1
TABLET ORAL
Qty: 90 TABLET | Refills: 3 | Status: SHIPPED | OUTPATIENT
Start: 2020-07-17 | End: 2020-07-31 | Stop reason: DRUGHIGH

## 2020-07-24 LAB
ALBUMIN SERPL-MCNC: 4.3 G/DL (ref 3.6–5.1)
ALBUMIN/GLOB SERPL: 1.9 (CALC) (ref 1–2.5)
ALP SERPL-CCNC: 88 U/L (ref 37–153)
ALT SERPL-CCNC: 11 U/L (ref 6–29)
AST SERPL-CCNC: 17 U/L (ref 10–35)
BASOPHILS # BLD AUTO: 19 CELLS/UL (ref 0–200)
BASOPHILS NFR BLD AUTO: 0.4 %
BILIRUB SERPL-MCNC: 0.5 MG/DL (ref 0.2–1.2)
BUN SERPL-MCNC: 12 MG/DL (ref 7–25)
BUN/CREAT SERPL: NORMAL (CALC) (ref 6–22)
CALCIUM SERPL-MCNC: 9.7 MG/DL (ref 8.6–10.4)
CHLORIDE SERPL-SCNC: 103 MMOL/L (ref 98–110)
CHOLEST SERPL-MCNC: 224 MG/DL
CHOLEST/HDLC SERPL: 3.1 (CALC)
CO2 SERPL-SCNC: 29 MMOL/L (ref 20–32)
CREAT SERPL-MCNC: 0.8 MG/DL (ref 0.6–0.93)
EOSINOPHIL # BLD AUTO: 58 CELLS/UL (ref 15–500)
EOSINOPHIL NFR BLD AUTO: 1.2 %
ERYTHROCYTE [DISTWIDTH] IN BLOOD BY AUTOMATED COUNT: 12.8 % (ref 11–15)
GLOBULIN SER CALC-MCNC: 2.3 G/DL (CALC) (ref 1.9–3.7)
GLUCOSE SERPL-MCNC: 92 MG/DL (ref 65–99)
HCT VFR BLD AUTO: 45.2 % (ref 35–45)
HDLC SERPL-MCNC: 72 MG/DL
HGB BLD-MCNC: 15 G/DL (ref 11.7–15.5)
LDLC SERPL CALC-MCNC: 131 MG/DL (CALC)
LYMPHOCYTES # BLD AUTO: 1090 CELLS/UL (ref 850–3900)
LYMPHOCYTES NFR BLD AUTO: 22.7 %
MCH RBC QN AUTO: 32.3 PG (ref 27–33)
MCHC RBC AUTO-ENTMCNC: 33.2 G/DL (ref 32–36)
MCV RBC AUTO: 97.2 FL (ref 80–100)
MONOCYTES # BLD AUTO: 355 CELLS/UL (ref 200–950)
MONOCYTES NFR BLD AUTO: 7.4 %
NEUTROPHILS # BLD AUTO: 3278 CELLS/UL (ref 1500–7800)
NEUTROPHILS NFR BLD AUTO: 68.3 %
NONHDLC SERPL-MCNC: 152 MG/DL (CALC)
PLATELET # BLD AUTO: 285 THOUSAND/UL (ref 140–400)
PMV BLD REES-ECKER: 11.1 FL (ref 7.5–12.5)
POTASSIUM SERPL-SCNC: 4.4 MMOL/L (ref 3.5–5.3)
PROT SERPL-MCNC: 6.6 G/DL (ref 6.1–8.1)
RBC # BLD AUTO: 4.65 MILLION/UL (ref 3.8–5.1)
SL AMB EGFR AFRICAN AMERICAN: 82 ML/MIN/1.73M2
SL AMB EGFR NON AFRICAN AMERICAN: 71 ML/MIN/1.73M2
SODIUM SERPL-SCNC: 139 MMOL/L (ref 135–146)
TRIGL SERPL-MCNC: 103 MG/DL
WBC # BLD AUTO: 4.8 THOUSAND/UL (ref 3.8–10.8)

## 2020-07-31 ENCOUNTER — OFFICE VISIT (OUTPATIENT)
Dept: FAMILY MEDICINE CLINIC | Facility: CLINIC | Age: 80
End: 2020-07-31
Payer: COMMERCIAL

## 2020-07-31 VITALS
WEIGHT: 124.4 LBS | RESPIRATION RATE: 14 BRPM | HEIGHT: 63 IN | OXYGEN SATURATION: 97 % | DIASTOLIC BLOOD PRESSURE: 64 MMHG | TEMPERATURE: 99.1 F | HEART RATE: 103 BPM | SYSTOLIC BLOOD PRESSURE: 132 MMHG | BODY MASS INDEX: 22.04 KG/M2

## 2020-07-31 DIAGNOSIS — I10 ESSENTIAL HYPERTENSION: ICD-10-CM

## 2020-07-31 DIAGNOSIS — Z00.00 MEDICARE ANNUAL WELLNESS VISIT, SUBSEQUENT: Primary | ICD-10-CM

## 2020-07-31 DIAGNOSIS — K21.9 GASTROESOPHAGEAL REFLUX DISEASE WITHOUT ESOPHAGITIS: ICD-10-CM

## 2020-07-31 DIAGNOSIS — E78.2 MIXED HYPERLIPIDEMIA: ICD-10-CM

## 2020-07-31 PROCEDURE — G0439 PPPS, SUBSEQ VISIT: HCPCS | Performed by: NURSE PRACTITIONER

## 2020-07-31 PROCEDURE — 1170F FXNL STATUS ASSESSED: CPT | Performed by: NURSE PRACTITIONER

## 2020-07-31 PROCEDURE — 1160F RVW MEDS BY RX/DR IN RCRD: CPT | Performed by: NURSE PRACTITIONER

## 2020-07-31 PROCEDURE — 1125F AMNT PAIN NOTED PAIN PRSNT: CPT | Performed by: NURSE PRACTITIONER

## 2020-07-31 PROCEDURE — 3008F BODY MASS INDEX DOCD: CPT | Performed by: NURSE PRACTITIONER

## 2020-07-31 PROCEDURE — 4040F PNEUMOC VAC/ADMIN/RCVD: CPT | Performed by: NURSE PRACTITIONER

## 2020-07-31 PROCEDURE — 3078F DIAST BP <80 MM HG: CPT | Performed by: NURSE PRACTITIONER

## 2020-07-31 PROCEDURE — 3075F SYST BP GE 130 - 139MM HG: CPT | Performed by: NURSE PRACTITIONER

## 2020-07-31 PROCEDURE — 1036F TOBACCO NON-USER: CPT | Performed by: NURSE PRACTITIONER

## 2020-07-31 RX ORDER — LOSARTAN POTASSIUM 100 MG/1
100 TABLET ORAL DAILY
Qty: 90 TABLET | Refills: 3
Start: 2020-07-31 | End: 2020-09-02 | Stop reason: SDUPTHER

## 2020-07-31 NOTE — PATIENT INSTRUCTIONS
Medicare Preventive Visit Patient Instructions  Thank you for completing your Welcome to Medicare Visit or Medicare Annual Wellness Visit today  Your next wellness visit will be due in one year (7/31/2021)  The screening/preventive services that you may require over the next 5-10 years are detailed below  Some tests may not apply to you based off risk factors and/or age  Screening tests ordered at today's visit but not completed yet may show as past due  Also, please note that scanned in results may not display below  Preventive Screenings:  Service Recommendations Previous Testing/Comments   Colorectal Cancer Screening  * Colonoscopy    * Fecal Occult Blood Test (FOBT)/Fecal Immunochemical Test (FIT)  * Fecal DNA/Cologuard Test  * Flexible Sigmoidoscopy Age: 54-65 years old   Colonoscopy: every 10 years (may be performed more frequently if at higher risk)  OR  FOBT/FIT: every 1 year  OR  Cologuard: every 3 years  OR  Sigmoidoscopy: every 5 years  Screening may be recommended earlier than age 48 if at higher risk for colorectal cancer  Also, an individualized decision between you and your healthcare provider will decide whether screening between the ages of 74-80 would be appropriate  Colonoscopy: 05/01/2017  FOBT/FIT: Not on file  Cologuard: Not on file  Sigmoidoscopy: Not on file         Breast Cancer Screening Age: 36 years old  Frequency: every 1-2 years  Not required if history of left and right mastectomy Mammogram: 10/17/2019       Cervical Cancer Screening Between the ages of 21-29, pap smear recommended once every 3 years  Between the ages of 33-67, can perform pap smear with HPV co-testing every 5 years     Recommendations may differ for women with a history of total hysterectomy, cervical cancer, or abnormal pap smears in past  Pap Smear: Not on file       Hepatitis C Screening Once for adults born between [facility]  More frequently in patients at high risk for Hepatitis C Hep C Antibody: 01/23/2019       Diabetes Screening 1-2 times per year if you're at risk for diabetes or have pre-diabetes Fasting glucose: 81 mg/dL   A1C: No results in last 5 years       Cholesterol Screening Once every 5 years if you don't have a lipid disorder  May order more often based on risk factors  Lipid panel: 07/24/2020         Other Preventive Screenings Covered by Medicare:  1  Abdominal Aortic Aneurysm (AAA) Screening: covered once if your at risk  You're considered to be at risk if you have a family history of AAA  2  Lung Cancer Screening: covers low dose CT scan once per year if you meet all of the following conditions: (1) Age 50-69; (2) No signs or symptoms of lung cancer; (3) Current smoker or have quit smoking within the last 15 years; (4) You have a tobacco smoking history of at least 30 pack years (packs per day multiplied by number of years you smoked); (5) You get a written order from a healthcare provider  3  Glaucoma Screening: covered annually if you're considered high risk: (1) You have diabetes OR (2) Family history of glaucoma OR (3)  aged 48 and older OR (3)  American aged 72 and older  3  Osteoporosis Screening: covered every 2 years if you meet one of the following conditions: (1) You're estrogen deficient and at risk for osteoporosis based off medical history and other findings; (2) Have a vertebral abnormality; (3) On glucocorticoid therapy for more than 3 months; (4) Have primary hyperparathyroidism; (5) On osteoporosis medications and need to assess response to drug therapy  · Last bone density test (DXA Scan): Not on file  5  HIV Screening: covered annually if you're between the age of 12-76  Also covered annually if you are younger than 13 and older than 72 with risk factors for HIV infection  For pregnant patients, it is covered up to 3 times per pregnancy      Immunizations:  Immunization Recommendations   Influenza Vaccine Annual influenza vaccination during flu season is recommended for all persons aged >= 6 months who do not have contraindications   Pneumococcal Vaccine (Prevnar and Pneumovax)  * Prevnar = PCV13  * Pneumovax = PPSV23   Adults 25-60 years old: 1-3 doses may be recommended based on certain risk factors  Adults 72 years old: Prevnar (PCV13) vaccine recommended followed by Pneumovax (PPSV23) vaccine  If already received PPSV23 since turning 65, then PCV13 recommended at least one year after PPSV23 dose  Hepatitis B Vaccine 3 dose series if at intermediate or high risk (ex: diabetes, end stage renal disease, liver disease)   Tetanus (Td) Vaccine - COST NOT COVERED BY MEDICARE PART B Following completion of primary series, a booster dose should be given every 10 years to maintain immunity against tetanus  Td may also be given as tetanus wound prophylaxis  Tdap Vaccine - COST NOT COVERED BY MEDICARE PART B Recommended at least once for all adults  For pregnant patients, recommended with each pregnancy  Shingles Vaccine (Shingrix) - COST NOT COVERED BY MEDICARE PART B  2 shot series recommended in those aged 48 and above     Health Maintenance Due:      Topic Date Due    CRC Screening: Colonoscopy  05/01/2020    Hepatitis C Screening  Completed     Immunizations Due:      Topic Date Due    DTaP,Tdap,and Td Vaccines (1 - Tdap) 12/02/1951    Influenza Vaccine  07/01/2020     Advance Directives   What are advance directives? Advance directives are legal documents that state your wishes and plans for medical care  These plans are made ahead of time in case you lose your ability to make decisions for yourself  Advance directives can apply to any medical decision, such as the treatments you want, and if you want to donate organs  What are the types of advance directives? There are many types of advance directives, and each state has rules about how to use them  You may choose a combination of any of the following:  · Living will:   This is a written record of the treatment you want  You can also choose which treatments you do not want, which to limit, and which to stop at a certain time  This includes surgery, medicine, IV fluid, and tube feedings  · Durable power of  for healthcare [facility]): This is a written record that states who you want to make healthcare choices for you when you are unable to make them for yourself  This person, called a proxy, is usually a family member or a friend  You may choose more than 1 proxy  · Do not resuscitate (DNR) order:  A DNR order is used in case your heart stops beating or you stop breathing  It is a request not to have certain forms of treatment, such as CPR  A DNR order may be included in other types of advance directives  · Medical directive: This covers the care that you want if you are in a coma, near death, or unable to make decisions for yourself  You can list the treatments you want for each condition  Treatment may include pain medicine, surgery, blood transfusions, dialysis, IV or tube feedings, and a ventilator (breathing machine)  · Values history: This document has questions about your views, beliefs, and how you feel and think about life  This information can help others choose the care that you would choose  Why are advance directives important? An advance directive helps you control your care  Although spoken wishes may be used, it is better to have your wishes written down  Spoken wishes can be misunderstood, or not followed  Treatments may be given even if you do not want them  An advance directive may make it easier for your family to make difficult choices about your care  © Copyright [employer] 2018 Information is for End User's use only and may not be sold, redistributed or otherwise used for commercial purposes   All illustrations and images included in CareNotes® are the copyrighted property of [employer]  or [facility]

## 2020-07-31 NOTE — PROGRESS NOTES
Assessment and Plan:     Problem List Items Addressed This Visit        Digestive    Acid reflux disease    Relevant Orders    Comprehensive metabolic panel    Lipid Panel with Direct LDL reflex       Cardiovascular and Mediastinum    Essential hypertension    Relevant Medications    losartan (COZAAR) 100 MG tablet    Other Relevant Orders    Comprehensive metabolic panel    Lipid Panel with Direct LDL reflex       Other    Hyperlipidemia    Relevant Orders    Comprehensive metabolic panel    Lipid Panel with Direct LDL reflex      Other Visit Diagnoses     Medicare annual wellness visit, subsequent    -  Primary    Relevant Orders    Comprehensive metabolic panel    Lipid Panel with Direct LDL reflex           Preventive health issues were discussed with patient, and age appropriate screening tests were ordered as noted in patient's After Visit Summary  Personalized health advice and appropriate referrals for health education or preventive services given if needed, as noted in patient's After Visit Summary  History of Present Illness:     Patient presents for Welcome to Medicare visit  Patient Care Team:  IRAM Alvarado as PCP - General (Family Medicine)  Aurther Passer, MD Glee Ang, PA-C Lianne Gallop, CRNP Kristy Collet, MD Aurther Passer, MD as Endoscopist     Review of Systems:     Review of Systems   Constitutional: Negative for fatigue and fever  HENT: Negative for congestion and postnasal drip  Eyes: Negative for photophobia and visual disturbance  Respiratory: Negative for cough and shortness of breath  Cardiovascular: Negative for chest pain and palpitations  Gastrointestinal: Negative for constipation and diarrhea  Genitourinary: Negative for dysuria and frequency  Musculoskeletal: Negative for arthralgias and myalgias  Skin: Negative for rash  Neurological: Negative for dizziness and headaches  Hematological: Negative for adenopathy  Psychiatric/Behavioral: Negative for dysphoric mood  The patient is not nervous/anxious  Problem List:     Patient Active Problem List   Diagnosis    Acid reflux disease    Essential hypertension    Hyperlipidemia    Spondylosis of cervical region without myelopathy or radiculopathy    Thyroid nodule    Vitamin D deficiency      Past Medical and Surgical History:     Past Medical History:   Diagnosis Date    GERD (gastroesophageal reflux disease)     Hyperlipidemia     Hypertension     Spinal stenosis     Trapezius muscle spasm     last assessed - [id number]     Past Surgical History:   Procedure Laterality Date    BREAST CYST ASPIRATION Right 2010    DEBRIDEMENT TENNIS ELBOW      ELBOW SURGERY Right     tennis elbow    FASCIOTOMY ELBOW Right     of the lateral epicondyle; last assessed - [id number]    HYSTERECTOMY      WIN-BSO    OOPHORECTOMY Bilateral     SD COLONOSCOPY FLX DX W/COLLJ SPEC WHEN PFRMD N/A 5/1/2017    Procedure: EGD AND COLONOSCOPY;  Surgeon: Wanda Chua MD;  Location: [facility];   Service: Gastroenterology      Family History:     Family History   Problem Relation Age of Onset    Cancer Sister     Colon cancer Sister 71    Diabetes Sister     Melanoma Mother     Cirrhosis Father         alcoholic    Stroke Father         CVA (cerebral vascular accident)    Arrhythmia Father         pacemaker placement    No Known Problems Maternal Grandmother     No Known Problems Maternal Grandfather     No Known Problems Paternal Grandmother     No Known Problems Paternal Grandfather     No Known Problems Sister     No Known Problems Sister     No Known Problems Sister     No Known Problems Sister     No Known Problems Maternal Aunt     No Known Problems Maternal Aunt     No Known Problems Maternal Aunt     No Known Problems Maternal Aunt     No Known Problems Maternal Aunt     No Known Problems Maternal Aunt     No Known Problems Paternal Aunt     No Known Problems Paternal Aunt     No Known Problems Paternal Aunt       Social History:        Social History     Socioeconomic History    Marital status: /Civil Union     Spouse name: None    Number of children: 2    Years of education: None    Highest education level: None   Occupational History    Occupation: Retired -  - [address] resource strain: None    Food insecurity:     Worry: None     Inability: None    Transportation needs:     Medical: None     Non-medical: None   Tobacco Use    Smoking status: Former Smoker     Packs/day: 0 50     Last attempt to quit: 2016     Years since quittin 0    Smokeless tobacco: Former User    Tobacco comment: Quit 2014 -  ppd for 40 years per [employer]   Substance and Sexual Activity    Alcohol use: No     Comment: Consumes alcohol occasionally per [employer]    Drug use: No    Sexual activity: None   Lifestyle    Physical activity:     Days per week: None     Minutes per session: None    Stress: None   Relationships    Social connections:     Talks on phone: None     Gets together: None     Attends Gnosticist service: None     Active member of club or organization: None     Attends meetings of clubs or organizations: None     Relationship status: None    Intimate partner violence:     Fear of current or ex partner: None     Emotionally abused: None     Physically abused: None     Forced sexual activity: None   Other Topics Concern    None   Social History Narrative    Two children - [address]      Medications and Allergies:     Current Outpatient Medications   Medication Sig Dispense Refill    Cholecalciferol (VITAMIN D) 2000 UNITS CAPS Take by mouth   omeprazole (PriLOSEC) 40 MG capsule TAKE 1 CAPSULE EVERY DAY 90 capsule 3    losartan (COZAAR) 100 MG tablet Take 1 tablet (100 mg total) by mouth daily 90 tablet 3     No current facility-administered medications for this visit  Allergies   Allergen Reactions    Atorvastatin Edema    Other      Cholesterol med- name unknown and pneumonia vaccine    Pneumococcal Vaccines Edema      Immunizations:     Immunization History   Administered Date(s) Administered     Influenza (IM) Preservative Free 11/24/2018    INFLUENZA 10/25/2016, 10/24/2017, 11/01/2018, 11/24/2018    Influenza Split High Dose Preservative Free IM 10/10/2014, 10/13/2015, 10/25/2016, 11/01/2018, 11/07/2019    Influenza TIV (IM) 10/08/2013    Influenza, high dose seasonal 0 5 mL 10/24/2019    Pneumococcal Polysaccharide PPV23 10/12/2010    Zoster 05/31/2017      Health Maintenance:         Topic Date Due    CRC Screening: Colonoscopy  05/01/2020    Hepatitis C Screening  Completed         Topic Date Due    Influenza Vaccine  07/01/2020      Medicare Screening Tests and Risk Assessments:     Teressa Holter is here for her Subsequent Wellness visit  Last Medicare Wellness visit information reviewed, patient interviewed and updates made to the record today  Health Risk Assessment:   Patient rates overall health as good  Patient feels that their physical health rating is same  Eyesight was rated as same  Hearing was rated as same  Patient feels that their emotional and mental health rating is same  Pain experienced in the last 7 days has been none  Patient states that she has experienced no weight loss or gain in last 6 months  Fall Risk Screening: In the past year, patient has experienced: no history of falling in past year      Urinary Incontinence Screening:   Patient has not leaked urine accidently in the last six months  Home Safety:  Patient does not have trouble with stairs inside or outside of their home  Patient has working smoke alarms and has working carbon monoxide detector  Home safety hazards include: none  Nutrition:   Current diet is Regular  Medications:   Patient is currently taking over-the-counter supplements   OTC medications include: see medication list  Patient is able to manage medications  Activities of Daily Living (ADLs)/Instrumental Activities of Daily Living (IADLs):   Walk and transfer into and out of bed and chair?: Yes  Dress and groom yourself?: Yes    Bathe or shower yourself?: Yes    Feed yourself? Yes  Do your laundry/housekeeping?: Yes  Manage your money, pay your bills and track your expenses?: Yes  Make your own meals?: Yes    Do your own shopping?: Yes    Previous Hospitalizations:   Any hospitalizations or ED visits within the last 12 months?: No      Advance Care Planning:   Living will: No    Durable POA for healthcare: No    Advanced directive: No    Advanced directive counseling given: No    Five wishes given: Yes    End of Life Decisions reviewed with patient: Yes      Comments: Needs to fill out five wishes      PREVENTIVE SCREENINGS      Cardiovascular Screening:    General: Screening Not Indicated and History Lipid Disorder      Diabetes Screening:     General: Screening Current      Colorectal Cancer Screening:     General: Risks and Benefits Discussed    Due for: Colonoscopy - Low Risk      Breast Cancer Screening:     General: Screening Current      Cervical Cancer Screening:    General: Screening Not Indicated      Osteoporosis Screening:    General: Patient Declines      Abdominal Aortic Aneurysm (AAA) Screening:        General: Screening Not Indicated      Lung Cancer Screening:     General: Screening Not Indicated      Hepatitis C Screening:    General: Screening Current    Other Counseling Topics:   Alcohol use counseling, car/seat belt/driving safety, skin self-exam, sunscreen and calcium and vitamin D intake and regular weightbearing exercise       No exam data present     Physical Exam:     /64 (BP Location: Left arm, Patient Position: Sitting, Cuff Size: Standard)   Pulse 103   Temp 99 1 °F (37 3 °C) (Tympanic)   Resp 14   Ht 5' 3" (1 6 m)   Wt 56 4 kg (124 lb 6 4 oz)   LMP 1986 (LMP Unknown) Comment: hysterectomy 30 years ago  SpO2 97%   BMI 22 04 kg/m²       Family History   Problem Relation Age of Onset   Carlene Courser Cancer Sister     Colon cancer Sister 71    Diabetes Sister     Melanoma Mother     Cirrhosis Father         alcoholic    Stroke Father         CVA (cerebral vascular accident)    Arrhythmia Father         pacemaker placement    No Known Problems Maternal Grandmother     No Known Problems Maternal Grandfather     No Known Problems Paternal Grandmother     No Known Problems Paternal Grandfather     No Known Problems Sister     No Known Problems Sister     No Known Problems Sister     No Known Problems Sister     No Known Problems Maternal Aunt     No Known Problems Maternal Aunt     No Known Problems Maternal Aunt     No Known Problems Maternal Aunt     No Known Problems Maternal Aunt     No Known Problems Maternal Aunt     No Known Problems Paternal Aunt     No Known Problems Paternal Aunt     No Known Problems Paternal Aunt      Past Medical History:   Diagnosis Date    GERD (gastroesophageal reflux disease)     Hyperlipidemia     Hypertension     Spinal stenosis     Trapezius muscle spasm     last assessed - [id number]     Social History     Socioeconomic History    Marital status: /Civil Union     Spouse name: Not on file    Number of children: 2    Years of education: Not on file    Highest education level: Not on file   Occupational History    Occupation: Retired -  - [employer]   Social Needs    Financial resource strain: Not on file    Food insecurity:     Worry: Not on file     Inability: Not on file   [employer] needs:     Medical: Not on file     Non-medical: Not on file   Tobacco Use    Smoking status: Former Smoker     Packs/day: 0 50     Last attempt to quit: 2016     Years since quittin 0    Smokeless tobacco: Former User    Tobacco comment: Quit 2014 -  ppd for 40 years per [employer] Substance and Sexual Activity    Alcohol use: No     Comment: Consumes alcohol occasionally per [employer]    Drug use: No    Sexual activity: Not on file   Lifestyle    Physical activity:     Days per week: Not on file     Minutes per session: Not on file    Stress: Not on file   Relationships    Social connections:     Talks on phone: Not on file     Gets together: Not on file     Attends Gnosticist service: Not on file     Active member of club or organization: Not on file     Attends meetings of clubs or organizations: Not on file     Relationship status: Not on file    Intimate partner violence:     Fear of current or ex partner: Not on file     Emotionally abused: Not on file     Physically abused: Not on file     Forced sexual activity: Not on file   Other Topics Concern    Not on file   Social History Narrative    Two children - [address]       Current Outpatient Medications:     Cholecalciferol (VITAMIN D) 2000 UNITS CAPS, Take by mouth , Disp: , Rfl:     omeprazole (PriLOSEC) 40 MG capsule, TAKE 1 CAPSULE EVERY DAY, Disp: 90 capsule, Rfl: 3    losartan (COZAAR) 100 MG tablet, Take 1 tablet (100 mg total) by mouth daily, Disp: 90 tablet, Rfl: 3  Allergies   Allergen Reactions    Atorvastatin Edema    Other      Cholesterol med- name unknown and pneumonia vaccine    Pneumococcal Vaccines Edema     Physical Exam   Constitutional: She is oriented to person, place, and time  She appears well-developed and well-nourished  HENT:   Head: Normocephalic and atraumatic  Right Ear: External ear normal    Left Ear: External ear normal    Nose: Nose normal    Mouth/Throat: Oropharynx is clear and moist    Eyes: Pupils are equal, round, and reactive to light  Conjunctivae and EOM are normal    Neck: Normal range of motion  Neck supple  No thyromegaly present  Cardiovascular: Normal rate, regular rhythm, normal heart sounds and intact distal pulses     Pulmonary/Chest: Effort normal and breath sounds normal    Abdominal: Soft  Bowel sounds are normal    Musculoskeletal: Normal range of motion  Neurological: She is alert and oriented to person, place, and time  Skin: Skin is warm and dry  Capillary refill takes less than 2 seconds  Psychiatric: She has a normal mood and affect  Her behavior is normal  Judgment and thought content normal    Nursing note and vitals reviewed        IRAM Long

## 2020-09-01 ENCOUNTER — TELEPHONE (OUTPATIENT)
Dept: DERMATOLOGY | Facility: CLINIC | Age: 80
End: 2020-09-01

## 2020-09-02 DIAGNOSIS — I10 ESSENTIAL HYPERTENSION: ICD-10-CM

## 2020-09-02 RX ORDER — LOSARTAN POTASSIUM 100 MG/1
100 TABLET ORAL DAILY
Qty: 90 TABLET | Refills: 3
Start: 2020-09-02 | End: 2021-08-23

## 2020-09-02 NOTE — TELEPHONE ENCOUNTER
Medication:losartan (COZAAR) 100 MG tablet       Dosage: 100 mg  How Often:Sig: Take 1 tablet (100 mg total) by mouth daily   Quantity:  90  Last Office Visit: 7/31/2020  Next Office Visit: 1/26/2021  Last refilled:7/31/2020  How many pills left: 1 week   Pharmacy:   [place] [id number]  Phone: [phone] Fax: [phone]

## 2020-09-08 ENCOUNTER — TELEPHONE (OUTPATIENT)
Dept: FAMILY MEDICINE CLINIC | Facility: CLINIC | Age: 80
End: 2020-09-08

## 2020-09-08 NOTE — TELEPHONE ENCOUNTER
Rin Martinez called in to state that [facility] had not received request for Losartan 100mg the script was sent on 9/2/20  I did call [employer] and give verbal for the med with 90 day supply and 3 refills

## 2021-01-19 LAB
ALBUMIN SERPL-MCNC: 4 G/DL (ref 3.6–5.1)
ALBUMIN/GLOB SERPL: 1.4 (CALC) (ref 1–2.5)
ALP SERPL-CCNC: 116 U/L (ref 37–153)
ALT SERPL-CCNC: 11 U/L (ref 6–29)
AST SERPL-CCNC: 16 U/L (ref 10–35)
BILIRUB SERPL-MCNC: 0.5 MG/DL (ref 0.2–1.2)
BUN SERPL-MCNC: 13 MG/DL (ref 7–25)
BUN/CREAT SERPL: NORMAL (CALC) (ref 6–22)
CALCIUM SERPL-MCNC: 9.7 MG/DL (ref 8.6–10.4)
CHLORIDE SERPL-SCNC: 105 MMOL/L (ref 98–110)
CHOLEST SERPL-MCNC: 235 MG/DL
CHOLEST/HDLC SERPL: 2.7 (CALC)
CO2 SERPL-SCNC: 31 MMOL/L (ref 20–32)
CREAT SERPL-MCNC: 0.81 MG/DL (ref 0.6–0.93)
GLOBULIN SER CALC-MCNC: 2.8 G/DL (CALC) (ref 1.9–3.7)
GLUCOSE SERPL-MCNC: 86 MG/DL (ref 65–99)
HDLC SERPL-MCNC: 87 MG/DL
LDLC SERPL CALC-MCNC: 129 MG/DL (CALC)
NONHDLC SERPL-MCNC: 148 MG/DL (CALC)
POTASSIUM SERPL-SCNC: 4.5 MMOL/L (ref 3.5–5.3)
PROT SERPL-MCNC: 6.8 G/DL (ref 6.1–8.1)
SL AMB EGFR AFRICAN AMERICAN: 81 ML/MIN/1.73M2
SL AMB EGFR NON AFRICAN AMERICAN: 70 ML/MIN/1.73M2
SODIUM SERPL-SCNC: 141 MMOL/L (ref 135–146)
TRIGL SERPL-MCNC: 88 MG/DL

## 2021-01-21 ENCOUNTER — IMMUNIZATIONS (OUTPATIENT)
Dept: FAMILY MEDICINE CLINIC | Facility: HOSPITAL | Age: 81
End: 2021-01-21

## 2021-01-21 DIAGNOSIS — Z23 ENCOUNTER FOR IMMUNIZATION: Primary | ICD-10-CM

## 2021-01-21 PROCEDURE — 91300 SARS-COV-2 / COVID-19 MRNA VACCINE (PFIZER-BIONTECH) 30 MCG: CPT

## 2021-01-21 PROCEDURE — 0001A SARS-COV-2 / COVID-19 MRNA VACCINE (PFIZER-BIONTECH) 30 MCG: CPT

## 2021-01-26 ENCOUNTER — OFFICE VISIT (OUTPATIENT)
Dept: FAMILY MEDICINE CLINIC | Facility: CLINIC | Age: 81
End: 2021-01-26
Payer: COMMERCIAL

## 2021-01-26 VITALS
DIASTOLIC BLOOD PRESSURE: 62 MMHG | HEART RATE: 96 BPM | OXYGEN SATURATION: 98 % | RESPIRATION RATE: 14 BRPM | HEIGHT: 63 IN | TEMPERATURE: 97.4 F | SYSTOLIC BLOOD PRESSURE: 150 MMHG | BODY MASS INDEX: 22.11 KG/M2 | WEIGHT: 124.8 LBS

## 2021-01-26 DIAGNOSIS — Z12.11 COLON CANCER SCREENING: ICD-10-CM

## 2021-01-26 DIAGNOSIS — Z12.31 ENCOUNTER FOR SCREENING MAMMOGRAM FOR MALIGNANT NEOPLASM OF BREAST: ICD-10-CM

## 2021-01-26 DIAGNOSIS — E78.2 MIXED HYPERLIPIDEMIA: ICD-10-CM

## 2021-01-26 DIAGNOSIS — K21.9 GASTROESOPHAGEAL REFLUX DISEASE WITHOUT ESOPHAGITIS: ICD-10-CM

## 2021-01-26 DIAGNOSIS — I10 ESSENTIAL HYPERTENSION: Primary | ICD-10-CM

## 2021-01-26 PROCEDURE — 3725F SCREEN DEPRESSION PERFORMED: CPT | Performed by: FAMILY MEDICINE

## 2021-01-26 PROCEDURE — 99214 OFFICE O/P EST MOD 30 MIN: CPT | Performed by: FAMILY MEDICINE

## 2021-01-26 PROCEDURE — 1036F TOBACCO NON-USER: CPT | Performed by: FAMILY MEDICINE

## 2021-01-26 PROCEDURE — 1160F RVW MEDS BY RX/DR IN RCRD: CPT | Performed by: FAMILY MEDICINE

## 2021-01-26 PROCEDURE — 3077F SYST BP >= 140 MM HG: CPT | Performed by: FAMILY MEDICINE

## 2021-01-26 PROCEDURE — 3078F DIAST BP <80 MM HG: CPT | Performed by: FAMILY MEDICINE

## 2021-01-26 NOTE — PROGRESS NOTES
FAMILY PRACTICE OFFICE VISIT       NAME: Anastasiya Austin  AGE: 68 y o  SEX: female       : 1940        MRN: [id number]    DATE: 2021  TIME: 10:15 AM    Assessment and Plan   1  Essential hypertension  -     Comprehensive metabolic panel; Future  -     CBC and differential; Future  -     Lipid Panel with Direct LDL reflex; Future    2  Gastroesophageal reflux disease without esophagitis  -     Comprehensive metabolic panel; Future  -     CBC and differential; Future  -     Lipid Panel with Direct LDL reflex; Future    3  Mixed hyperlipidemia  -     Comprehensive metabolic panel; Future  -     CBC and differential; Future  -     Lipid Panel with Direct LDL reflex; Future    4  Colon cancer screening  -     Cologuard; Future  -     Comprehensive metabolic panel; Future  -     CBC and differential; Future  -     Lipid Panel with Direct LDL reflex; Future    5  Encounter for screening mammogram for malignant neoplasm of breast  -     Mammo screening bilateral w cad; Future; Expected date: 2021  -     Comprehensive metabolic panel; Future  -     CBC and differential; Future  -     Lipid Panel with Direct LDL reflex; Future                 Chief Complaint     Chief Complaint   Patient presents with    Follow-up       History of Present Illness   Anastasiya Austin is a 68y o -year-old female who is here for f/u to chronic medical conditions  Tolerating meds without side affects  Declines shingrix  Declines prevnar 13  Agrees to mammogram and cologuard  Declines colonoscopy  Overall feels well  Labs reviewed      Review of Systems   Review of Systems   Constitutional: Negative for fatigue and fever  HENT: Negative for congestion and postnasal drip  Eyes: Negative for photophobia and visual disturbance  Respiratory: Negative for cough and shortness of breath  Cardiovascular: Negative for chest pain and palpitations  Gastrointestinal: Negative for constipation and diarrhea     Genitourinary: Negative for dysuria and frequency  Musculoskeletal: Negative for arthralgias and myalgias  Skin: Negative for rash  Neurological: Negative for dizziness and headaches  Hematological: Negative for adenopathy  Psychiatric/Behavioral: Negative for dysphoric mood  The patient is not nervous/anxious  Active Problem List     Patient Active Problem List   Diagnosis    Acid reflux disease    Essential hypertension    Hyperlipidemia    Spondylosis of cervical region without myelopathy or radiculopathy    Thyroid nodule    Vitamin D deficiency         Past Medical History:  Past Medical History:   Diagnosis Date    GERD (gastroesophageal reflux disease)     Hyperlipidemia     Hypertension     Spinal stenosis     Trapezius muscle spasm     last assessed - [id number]       Past Surgical History:  Past Surgical History:   Procedure Laterality Date    BREAST CYST ASPIRATION Right 2010    DEBRIDEMENT TENNIS ELBOW      ELBOW SURGERY Right     tennis elbow    FASCIOTOMY ELBOW Right     of the lateral epicondyle; last assessed - [id number]    HYSTERECTOMY      WIN-BSO    OOPHORECTOMY Bilateral     MS COLONOSCOPY FLX DX W/COLLJ SPEC WHEN PFRMD N/A 5/1/2017    Procedure: EGD AND COLONOSCOPY;  Surgeon: Jalyn De Jesus MD;  Location: [facility];   Service: Gastroenterology       Family History:  Family History   Problem Relation Age of Onset    Cancer Sister     Colon cancer Sister 71    Diabetes Sister     Melanoma Mother     Cirrhosis Father         alcoholic    Stroke Father         CVA (cerebral vascular accident)    Arrhythmia Father         pacemaker placement    No Known Problems Maternal Grandmother     No Known Problems Maternal Grandfather     No Known Problems Paternal Grandmother     No Known Problems Paternal Grandfather     No Known Problems Sister     No Known Problems Sister     No Known Problems Sister     No Known Problems Sister     No Known Problems Maternal Aunt     No Known Problems Maternal Aunt     No Known Problems Maternal Aunt     No Known Problems Maternal Aunt     No Known Problems Maternal Aunt     No Known Problems Maternal Aunt     No Known Problems Paternal Aunt     No Known Problems Paternal Aunt     No Known Problems Paternal Aunt        Social History:  Social History     Socioeconomic History    Marital status: /Civil Union     Spouse name: Not on file    Number of children: 2    Years of education: Not on file    Highest education level: Not on file   Occupational History    Occupation: Retired -  - [address] Financial resource strain: Not on file    Food insecurity     Worry: Not on file     Inability: Not on file   [employer] needs     Medical: Not on file     Non-medical: Not on file   Tobacco Use    Smoking status: Former Smoker     Packs/day: 0 50     Quit date: 2016     Years since quittin 5    Smokeless tobacco: Former User    Tobacco comment: Quit 2014 -  ppd for 40 years per [employer]   Substance and Sexual Activity    Alcohol use: No     Comment: Consumes alcohol occasionally per [employer]    Drug use: No    Sexual activity: Not on file   Lifestyle    Physical activity     Days per week: Not on file     Minutes per session: Not on file    Stress: Not on file   Relationships    Social connections     Talks on phone: Not on file     Gets together: Not on file     Attends [employer] service: Not on file     Active member of club or organization: Not on file     Attends meetings of clubs or organizations: Not on file     Relationship status: Not on file    Intimate partner violence     Fear of current or ex partner: Not on file     Emotionally abused: Not on file     Physically abused: Not on file     Forced sexual activity: Not on file   Other Topics Concern    Not on file   Social History Narrative    Two children - [address]       Objective     Vitals:    21 0953   BP: 150/62   Pulse: 96   Resp: 14   Temp: (!) 97 4 °F (36 3 °C)   SpO2: 98%     Wt Readings from Last 3 Encounters:   01/26/21 56 6 kg (124 lb 12 8 oz)   07/31/20 56 4 kg (124 lb 6 4 oz)   01/30/20 57 5 kg (126 lb 12 8 oz)       Physical Exam  Vitals signs and nursing note reviewed  Constitutional:       Appearance: Normal appearance  HENT:      Head: Normocephalic and atraumatic  Right Ear: Tympanic membrane, ear canal and external ear normal       Left Ear: Tympanic membrane normal       Nose: Nose normal       Mouth/Throat:      Mouth: Mucous membranes are moist    Eyes:      Conjunctiva/sclera: Conjunctivae normal       Pupils: Pupils are equal, round, and reactive to light  Neck:      Musculoskeletal: Normal range of motion and neck supple  Cardiovascular:      Rate and Rhythm: Normal rate and regular rhythm  Pulses: Normal pulses  Heart sounds: Normal heart sounds  Pulmonary:      Effort: Pulmonary effort is normal       Breath sounds: Normal breath sounds  Abdominal:      General: Bowel sounds are normal       Palpations: Abdomen is soft  Musculoskeletal: Normal range of motion  Skin:     General: Skin is warm and dry  Capillary Refill: Capillary refill takes less than 2 seconds  Neurological:      Mental Status: She is alert and oriented to person, place, and time  Psychiatric:         Mood and Affect: Mood normal          Behavior: Behavior normal          Thought Content:  Thought content normal          Judgment: Judgment normal          Pertinent Laboratory/Diagnostic Studies:  Lab Results   Component Value Date    GLUCOSE 178 (H) 09/16/2016    BUN 13 01/19/2021    CREATININE 0 81 01/19/2021    CALCIUM 9 7 01/19/2021     10/11/2016    K 4 5 01/19/2021    CO2 31 01/19/2021     01/19/2021     Lab Results   Component Value Date    ALT 11 01/19/2021    AST 16 01/19/2021    ALKPHOS 116 01/19/2021    BILITOT 0 4 10/11/2016       Lab Results   Component Value Date    WBC 4 8 07/24/2020    HGB 15 0 07/24/2020    HCT 45 2 (H) 07/24/2020    MCV 97 2 07/24/2020     07/24/2020       No results found for: TSH    Lab Results   Component Value Date    CHOL 251 (H) 10/11/2016     Lab Results   Component Value Date    TRIG 88 01/19/2021     Lab Results   Component Value Date    HDL 87 01/19/2021     Lab Results   Component Value Date    LDLCALC 129 (H) 01/19/2021     No results found for: HGBA1C    Results for orders placed or performed in visit on 01/19/21   Lipid Panel with Direct LDL reflex   Result Value Ref Range    Total Cholesterol 235 (H) <200 mg/dL    HDL 87 > OR = 50 mg/dL    Triglycerides 88 <150 mg/dL    LDL Calculated 129 (H) mg/dL (calc)    Chol HDLC Ratio 2 7 <5 0 (calc)    Non-HDL Cholesterol 148 (H) <130 mg/dL (calc)   Comprehensive metabolic panel   Result Value Ref Range    Glucose, Random 86 65 - 99 mg/dL    BUN 13 7 - 25 mg/dL    Creatinine 0 81 0 60 - 0 93 mg/dL    eGFR Non  70 > OR = 60 mL/min/1 73m2    eGFR  81 > OR = 60 mL/min/1 73m2    SL AMB BUN/CREATININE RATIO NOT APPLICABLE 6 - 22 (calc)    Sodium 141 135 - 146 mmol/L    Potassium 4 5 3 5 - 5 3 mmol/L    Chloride 105 98 - 110 mmol/L    CO2 31 20 - 32 mmol/L    Calcium 9 7 8 6 - 10 4 mg/dL    Protein, Total 6 8 6 1 - 8 1 g/dL    Albumin 4 0 3 6 - 5 1 g/dL    Globulin 2 8 1 9 - 3 7 g/dL (calc)    Albumin/Globulin Ratio 1 4 1 0 - 2 5 (calc)    TOTAL BILIRUBIN 0 5 0 2 - 1 2 mg/dL    Alkaline Phosphatase 116 37 - 153 U/L    AST 16 10 - 35 U/L    ALT 11 6 - 29 U/L       Orders Placed This Encounter   Procedures    Cologuard    Mammo screening bilateral w cad    Comprehensive metabolic panel    CBC and differential    Lipid Panel with Direct LDL reflex       ALLERGIES:  Allergies   Allergen Reactions    Atorvastatin Edema    Other      Cholesterol med- name unknown and pneumonia vaccine    Pneumococcal Vaccines Edema       Current Medications     Current Outpatient Medications   Medication Sig Dispense Refill    Cholecalciferol (VITAMIN D) 2000 UNITS CAPS Take by mouth   losartan (COZAAR) 100 MG tablet Take 1 tablet (100 mg total) by mouth daily 90 tablet 3    omeprazole (PriLOSEC) 40 MG capsule TAKE 1 CAPSULE EVERY DAY 90 capsule 3     No current facility-administered medications for this visit            Health Maintenance     Health Maintenance   Topic Date Due    SLP PLAN OF CARE  1940    Colonoscopy Surveillance  05/01/2020    DTaP,Tdap,and Td Vaccines (1 - Tdap) 07/31/2021 (Originally 12/2/1961)    COVID-19 Vaccine (2 of 2 - Pfizer series) 02/11/2021    Fall Risk  07/31/2021    Medicare Annual Wellness Visit (AWV)  07/31/2021    BMI: Adult  07/31/2021    Depression Screening PHQ  01/26/2022    Hepatitis C Screening  Completed    Pneumococcal Vaccine: 65+ Years  Completed    Influenza Vaccine  Completed    HIB Vaccine  Aged Out    Hepatitis B Vaccine  Aged Out    IPV Vaccine  Aged Out    Hepatitis A Vaccine  Aged Out    Meningococcal ACWY Vaccine  Aged Out    HPV Vaccine  Aged Out     Immunization History   Administered Date(s) Administered    INFLUENZA 10/25/2016, 10/24/2017, 11/01/2018, 11/24/2018, 10/24/2019    Influenza Split High Dose Preservative Free IM 10/10/2014, 10/13/2015, 10/25/2016, 11/01/2018, 11/07/2019    Influenza, high dose seasonal 0 7 mL 10/24/2019    Influenza, recombinant, quadrivalent,injectable, preservative free 11/03/2020    Influenza, seasonal, injectable 10/08/2013    Influenza, seasonal, injectable, preservative free 11/24/2018    Pneumococcal Polysaccharide PPV23 10/12/2010    SARS-CoV-2 / COVID-19 mRNA IM (Pfizer-BioNTech) 01/21/2021    Zoster 05/31/2017          IRAM Moran

## 2021-02-09 ENCOUNTER — IMMUNIZATIONS (OUTPATIENT)
Dept: FAMILY MEDICINE CLINIC | Facility: HOSPITAL | Age: 81
End: 2021-02-09

## 2021-02-09 DIAGNOSIS — Z23 ENCOUNTER FOR IMMUNIZATION: Primary | ICD-10-CM

## 2021-02-09 PROCEDURE — 91300 SARS-COV-2 / COVID-19 MRNA VACCINE (PFIZER-BIONTECH) 30 MCG: CPT

## 2021-02-09 PROCEDURE — 0002A SARS-COV-2 / COVID-19 MRNA VACCINE (PFIZER-BIONTECH) 30 MCG: CPT

## 2021-07-15 DIAGNOSIS — K21.9 GASTROESOPHAGEAL REFLUX DISEASE WITHOUT ESOPHAGITIS: ICD-10-CM

## 2021-07-15 RX ORDER — OMEPRAZOLE 40 MG/1
CAPSULE, DELAYED RELEASE ORAL
Qty: 90 CAPSULE | Refills: 3 | Status: SHIPPED | OUTPATIENT
Start: 2021-07-15 | End: 2022-08-09 | Stop reason: SDUPTHER

## 2021-07-23 ENCOUNTER — RA CDI HCC (OUTPATIENT)
Dept: OTHER | Facility: HOSPITAL | Age: 81
End: 2021-07-23

## 2021-07-23 NOTE — PROGRESS NOTES
[facility]  coding opportunities          Chart reviewed, no opportunity found: CHART REVIEWED, NO OPPORTUNITY FOUND

## 2021-07-28 LAB
ALBUMIN SERPL-MCNC: 4.2 G/DL (ref 3.6–5.1)
ALBUMIN/GLOB SERPL: 1.8 (CALC) (ref 1–2.5)
ALP SERPL-CCNC: 92 U/L (ref 37–153)
ALT SERPL-CCNC: 9 U/L (ref 6–29)
AST SERPL-CCNC: 17 U/L (ref 10–35)
BASOPHILS # BLD AUTO: 21 CELLS/UL (ref 0–200)
BASOPHILS NFR BLD AUTO: 0.5 %
BILIRUB SERPL-MCNC: 0.5 MG/DL (ref 0.2–1.2)
BUN SERPL-MCNC: 12 MG/DL (ref 7–25)
BUN/CREAT SERPL: NORMAL (CALC) (ref 6–22)
CALCIUM SERPL-MCNC: 9.8 MG/DL (ref 8.6–10.4)
CHLORIDE SERPL-SCNC: 104 MMOL/L (ref 98–110)
CHOLEST SERPL-MCNC: 216 MG/DL
CHOLEST/HDLC SERPL: 3 (CALC)
CO2 SERPL-SCNC: 29 MMOL/L (ref 20–32)
CREAT SERPL-MCNC: 0.79 MG/DL (ref 0.6–0.93)
EOSINOPHIL # BLD AUTO: 59 CELLS/UL (ref 15–500)
EOSINOPHIL NFR BLD AUTO: 1.4 %
ERYTHROCYTE [DISTWIDTH] IN BLOOD BY AUTOMATED COUNT: 12.5 % (ref 11–15)
GLOBULIN SER CALC-MCNC: 2.4 G/DL (CALC) (ref 1.9–3.7)
GLUCOSE SERPL-MCNC: 93 MG/DL (ref 65–99)
HCT VFR BLD AUTO: 42.4 % (ref 35–45)
HDLC SERPL-MCNC: 71 MG/DL
HGB BLD-MCNC: 14.6 G/DL (ref 11.7–15.5)
LDLC SERPL CALC-MCNC: 125 MG/DL (CALC)
LYMPHOCYTES # BLD AUTO: 1096 CELLS/UL (ref 850–3900)
LYMPHOCYTES NFR BLD AUTO: 26.1 %
MCH RBC QN AUTO: 32.7 PG (ref 27–33)
MCHC RBC AUTO-ENTMCNC: 34.4 G/DL (ref 32–36)
MCV RBC AUTO: 94.9 FL (ref 80–100)
MONOCYTES # BLD AUTO: 302 CELLS/UL (ref 200–950)
MONOCYTES NFR BLD AUTO: 7.2 %
NEUTROPHILS # BLD AUTO: 2722 CELLS/UL (ref 1500–7800)
NEUTROPHILS NFR BLD AUTO: 64.8 %
NONHDLC SERPL-MCNC: 145 MG/DL (CALC)
PLATELET # BLD AUTO: 286 THOUSAND/UL (ref 140–400)
PMV BLD REES-ECKER: 10.6 FL (ref 7.5–12.5)
POTASSIUM SERPL-SCNC: 4.4 MMOL/L (ref 3.5–5.3)
PROT SERPL-MCNC: 6.6 G/DL (ref 6.1–8.1)
RBC # BLD AUTO: 4.47 MILLION/UL (ref 3.8–5.1)
SL AMB EGFR AFRICAN AMERICAN: 83 ML/MIN/1.73M2
SL AMB EGFR NON AFRICAN AMERICAN: 72 ML/MIN/1.73M2
SODIUM SERPL-SCNC: 139 MMOL/L (ref 135–146)
TRIGL SERPL-MCNC: 92 MG/DL
WBC # BLD AUTO: 4.2 THOUSAND/UL (ref 3.8–10.8)

## 2021-08-02 ENCOUNTER — OFFICE VISIT (OUTPATIENT)
Dept: FAMILY MEDICINE CLINIC | Facility: CLINIC | Age: 81
End: 2021-08-02
Payer: COMMERCIAL

## 2021-08-02 VITALS
WEIGHT: 124.6 LBS | OXYGEN SATURATION: 100 % | RESPIRATION RATE: 19 BRPM | SYSTOLIC BLOOD PRESSURE: 126 MMHG | DIASTOLIC BLOOD PRESSURE: 60 MMHG | HEART RATE: 90 BPM | HEIGHT: 62 IN | TEMPERATURE: 96.5 F | BODY MASS INDEX: 22.93 KG/M2

## 2021-08-02 DIAGNOSIS — K21.9 GASTROESOPHAGEAL REFLUX DISEASE WITHOUT ESOPHAGITIS: ICD-10-CM

## 2021-08-02 DIAGNOSIS — E55.9 VITAMIN D DEFICIENCY: ICD-10-CM

## 2021-08-02 DIAGNOSIS — Z78.0 POSTMENOPAUSAL: ICD-10-CM

## 2021-08-02 DIAGNOSIS — Z00.00 MEDICARE ANNUAL WELLNESS VISIT, SUBSEQUENT: Primary | ICD-10-CM

## 2021-08-02 DIAGNOSIS — E04.1 THYROID NODULE: ICD-10-CM

## 2021-08-02 DIAGNOSIS — E78.2 MIXED HYPERLIPIDEMIA: ICD-10-CM

## 2021-08-02 DIAGNOSIS — I10 ESSENTIAL HYPERTENSION: ICD-10-CM

## 2021-08-02 PROCEDURE — G0439 PPPS, SUBSEQ VISIT: HCPCS | Performed by: NURSE PRACTITIONER

## 2021-08-02 PROCEDURE — 3288F FALL RISK ASSESSMENT DOCD: CPT | Performed by: NURSE PRACTITIONER

## 2021-08-02 PROCEDURE — 99214 OFFICE O/P EST MOD 30 MIN: CPT | Performed by: NURSE PRACTITIONER

## 2021-08-02 NOTE — PATIENT INSTRUCTIONS
Medicare Preventive Visit Patient Instructions  Thank you for completing your Welcome to Medicare Visit or Medicare Annual Wellness Visit today  Your next wellness visit will be due in one year (8/3/2022)  The screening/preventive services that you may require over the next 5-10 years are detailed below  Some tests may not apply to you based off risk factors and/or age  Screening tests ordered at today's visit but not completed yet may show as past due  Also, please note that scanned in results may not display below  Preventive Screenings:  Service Recommendations Previous Testing/Comments   Colorectal Cancer Screening  * Colonoscopy    * Fecal Occult Blood Test (FOBT)/Fecal Immunochemical Test (FIT)  * Fecal DNA/Cologuard Test  * Flexible Sigmoidoscopy Age: 54-65 years old   Colonoscopy: every 10 years (may be performed more frequently if at higher risk)  OR  FOBT/FIT: every 1 year  OR  Cologuard: every 3 years  OR  Sigmoidoscopy: every 5 years  Screening may be recommended earlier than age 48 if at higher risk for colorectal cancer  Also, an individualized decision between you and your healthcare provider will decide whether screening between the ages of 74-80 would be appropriate  Colonoscopy: 05/01/2017  FOBT/FIT: Not on file  Cologuard: Not on file  Sigmoidoscopy: Not on file          Breast Cancer Screening Age: 36 years old  Frequency: every 1-2 years  Not required if history of left and right mastectomy Mammogram: 10/17/2019    Screening Current   Cervical Cancer Screening Between the ages of 21-29, pap smear recommended once every 3 years  Between the ages of 33-67, can perform pap smear with HPV co-testing every 5 years     Recommendations may differ for women with a history of total hysterectomy, cervical cancer, or abnormal pap smears in past  Pap Smear: Not on file    Screening Not Indicated   Hepatitis C Screening Once for adults born between 1945 and 1965  More frequently in patients at high risk for Hepatitis C Hep C Antibody: 01/23/2019    Screening Current   Diabetes Screening 1-2 times per year if you're at risk for diabetes or have pre-diabetes Fasting glucose: 81 mg/dL   A1C: No results in last 5 years    Screening Current   Cholesterol Screening Once every 5 years if you don't have a lipid disorder  May order more often based on risk factors  Lipid panel: 07/27/2021    Screening Not Indicated  History Lipid Disorder     Other Preventive Screenings Covered by Medicare:  1  Abdominal Aortic Aneurysm (AAA) Screening: covered once if your at risk  You're considered to be at risk if you have a family history of AAA  2  Lung Cancer Screening: covers low dose CT scan once per year if you meet all of the following conditions: (1) Age 50-69; (2) No signs or symptoms of lung cancer; (3) Current smoker or have quit smoking within the last 15 years; (4) You have a tobacco smoking history of at least 30 pack years (packs per day multiplied by number of years you smoked); (5) You get a written order from a healthcare provider  3  Glaucoma Screening: covered annually if you're considered high risk: (1) You have diabetes OR (2) Family history of glaucoma OR (3)  aged 48 and older OR (3)  American aged 72 and older  3  Osteoporosis Screening: covered every 2 years if you meet one of the following conditions: (1) You're estrogen deficient and at risk for osteoporosis based off medical history and other findings; (2) Have a vertebral abnormality; (3) On glucocorticoid therapy for more than 3 months; (4) Have primary hyperparathyroidism; (5) On osteoporosis medications and need to assess response to drug therapy  · Last bone density test (DXA Scan): Not on file  5  HIV Screening: covered annually if you're between the age of 12-76  Also covered annually if you are younger than 13 and older than 72 with risk factors for HIV infection   For pregnant patients, it is covered up to 3 times per pregnancy  Immunizations:  Immunization Recommendations   Influenza Vaccine Annual influenza vaccination during flu season is recommended for all persons aged >= 6 months who do not have contraindications   Pneumococcal Vaccine (Prevnar and Pneumovax)  * Prevnar = PCV13  * Pneumovax = PPSV23   Adults 25-60 years old: 1-3 doses may be recommended based on certain risk factors  Adults 72 years old: Prevnar (PCV13) vaccine recommended followed by Pneumovax (PPSV23) vaccine  If already received PPSV23 since turning 65, then PCV13 recommended at least one year after PPSV23 dose  Hepatitis B Vaccine 3 dose series if at intermediate or high risk (ex: diabetes, end stage renal disease, liver disease)   Tetanus (Td) Vaccine - COST NOT COVERED BY MEDICARE PART B Following completion of primary series, a booster dose should be given every 10 years to maintain immunity against tetanus  Td may also be given as tetanus wound prophylaxis  Tdap Vaccine - COST NOT COVERED BY MEDICARE PART B Recommended at least once for all adults  For pregnant patients, recommended with each pregnancy  Shingles Vaccine (Shingrix) - COST NOT COVERED BY MEDICARE PART B  2 shot series recommended in those aged 48 and above     Health Maintenance Due:      Topic Date Due    Colorectal Cancer Screening  05/01/2020    Breast Cancer Screening: Mammogram  10/17/2021    Hepatitis C Screening  Completed     Immunizations Due:      Topic Date Due    DTaP,Tdap,and Td Vaccines (1 - Tdap) Never done    Influenza Vaccine (1) 09/01/2021     Advance Directives   What are advance directives? Advance directives are legal documents that state your wishes and plans for medical care  These plans are made ahead of time in case you lose your ability to make decisions for yourself  Advance directives can apply to any medical decision, such as the treatments you want, and if you want to donate organs  What are the types of advance directives?   There are many types of advance directives, and each state has rules about how to use them  You may choose a combination of any of the following:  · Living will: This is a written record of the treatment you want  You can also choose which treatments you do not want, which to limit, and which to stop at a certain time  This includes surgery, medicine, IV fluid, and tube feedings  · Durable power of  for healthcare [facility]): This is a written record that states who you want to make healthcare choices for you when you are unable to make them for yourself  This person, called a proxy, is usually a family member or a friend  You may choose more than 1 proxy  · Do not resuscitate (DNR) order:  A DNR order is used in case your heart stops beating or you stop breathing  It is a request not to have certain forms of treatment, such as CPR  A DNR order may be included in other types of advance directives  · Medical directive: This covers the care that you want if you are in a coma, near death, or unable to make decisions for yourself  You can list the treatments you want for each condition  Treatment may include pain medicine, surgery, blood transfusions, dialysis, IV or tube feedings, and a ventilator (breathing machine)  · Values history: This document has questions about your views, beliefs, and how you feel and think about life  This information can help others choose the care that you would choose  Why are advance directives important? An advance directive helps you control your care  Although spoken wishes may be used, it is better to have your wishes written down  Spoken wishes can be misunderstood, or not followed  Treatments may be given even if you do not want them  An advance directive may make it easier for your family to make difficult choices about your care        © Copyright [employer] 2018 Information is for End User's use only and may not be sold, redistributed or otherwise used for commercial purposes   All illustrations and images included in CareNotes® are the copyrighted property of A [employer] , [employer]  or [employer]ac[employer] Matta

## 2021-08-02 NOTE — PROGRESS NOTES
Assessment and Plan:     Problem List Items Addressed This Visit        Digestive    Acid reflux disease    Relevant Orders    Lipid Panel with Direct LDL reflex    TSH, 3rd generation with Free T4 reflex    CBC and differential    Comprehensive metabolic panel       Endocrine    Thyroid nodule    Relevant Orders    Lipid Panel with Direct LDL reflex    TSH, 3rd generation with Free T4 reflex    CBC and differential    Comprehensive metabolic panel       Cardiovascular and Mediastinum    Essential hypertension    Relevant Orders    Lipid Panel with Direct LDL reflex    TSH, 3rd generation with Free T4 reflex    CBC and differential    Comprehensive metabolic panel       Other    Vitamin D deficiency    Relevant Orders    Lipid Panel with Direct LDL reflex    TSH, 3rd generation with Free T4 reflex    CBC and differential    Comprehensive metabolic panel    Hyperlipidemia    Relevant Orders    Lipid Panel with Direct LDL reflex    TSH, 3rd generation with Free T4 reflex    CBC and differential    Comprehensive metabolic panel      Other Visit Diagnoses     Medicare annual wellness visit, subsequent    -  Primary    Postmenopausal        Relevant Orders    DXA bone density spine hip and pelvis    Lipid Panel with Direct LDL reflex    TSH, 3rd generation with Free T4 reflex    CBC and differential    Comprehensive metabolic panel           Preventive health issues were discussed with patient, and age appropriate screening tests were ordered as noted in patient's After Visit Summary  Personalized health advice and appropriate referrals for health education or preventive services given if needed, as noted in patient's After Visit Summary       History of Present Illness:     Patient presents for Medicare Annual Wellness visit    Patient Care Team:  IRAM Ayers as PCP - General (Family Medicine)  RANDY Pepe MD Annett Amour, MD as Endoscopist     Problem List:     Patient Active Problem List   Diagnosis    Acid reflux disease    Essential hypertension    Hyperlipidemia    Spondylosis of cervical region without myelopathy or radiculopathy    Thyroid nodule    Vitamin D deficiency      Past Medical and Surgical History:     Past Medical History:   Diagnosis Date    GERD (gastroesophageal reflux disease)     Hyperlipidemia     Hypertension     Spinal stenosis     Trapezius muscle spasm     last assessed - [id number]     Past Surgical History:   Procedure Laterality Date    BREAST CYST ASPIRATION Right 2010    DEBRIDEMENT TENNIS ELBOW      ELBOW SURGERY Right     tennis elbow    FASCIOTOMY ELBOW Right     of the lateral epicondyle; last assessed - [id number]    HYSTERECTOMY      WIN-BSO    OOPHORECTOMY Bilateral     DE COLONOSCOPY FLX DX W/COLLJ SPEC WHEN PFRMD N/A 5/1/2017    Procedure: EGD AND COLONOSCOPY;  Surgeon: Micah Valdivia MD;  [facility];   Service: Gastroenterology      Family History:     Family History   Problem Relation Age of Onset    Cancer Sister     Colon cancer Sister 71    Diabetes Sister     Melanoma Mother     Cirrhosis Father         alcoholic    Stroke Father         CVA (cerebral vascular accident)    Arrhythmia Father         pacemaker placement    No Known Problems Maternal Grandmother     No Known Problems Maternal Grandfather     No Known Problems Paternal Grandmother     No Known Problems Paternal Grandfather     No Known Problems Sister     No Known Problems Sister     No Known Problems Sister     No Known Problems Sister     No Known Problems Maternal Aunt     No Known Problems Maternal Aunt     No Known Problems Maternal Aunt     No Known Problems Maternal Aunt     No Known Problems Maternal Aunt     No Known Problems Maternal Aunt     No Known Problems Paternal Aunt     No Known Problems Paternal Aunt     No Known Problems Paternal Aunt       Social History:     Social History     Socioeconomic History    Marital status: /Civil Union     Spouse name: None    Number of children: 2    Years of education: None    Highest education level: None   Occupational History    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 68     Social Determinants of Health     Financial Resource Strain:     Difficulty of Paying Living Expenses:    Food Insecurity:     Worried About Running Out of Food in the Last Year:     [address] in the Last Year:    Transportation Needs:     Lack of Transportation (Medical):  Lack of Transportation (Non-Medical):    Physical Activity:     Days of Exercise per Week:     Minutes of Exercise per Session:    Stress:     Feeling of Stress :    Social Connections:     Frequency of Communication with Friends and Family:     Frequency of Social Gatherings with Friends and Family:     Attends [employer] Services:     Active Member of Clubs or Organizations:     Attends Club or Organization Meetings:     Marital Status:    Intimate Partner Violence:     Fear of Current or Ex-Partner:     Emotionally Abused:     Physically Abused:     Sexually Abused:       Medications and Allergies:     Current Outpatient Medications   Medication Sig Dispense Refill    Cholecalciferol (VITAMIN D) 2000 UNITS CAPS Take by mouth        losartan (COZAAR) 100 MG tablet Take 1 tablet (100 mg total) by mouth daily 90 tablet 3    omeprazole (PriLOSEC) 40 MG capsule TAKE 1 CAPSULE EVERY DAY 90 capsule 3     No current facility-administered medications for this visit  Allergies   Allergen Reactions    Atorvastatin Edema    Other      Cholesterol med- name unknown and pneumonia vaccine    Pneumococcal Vaccines Edema      Immunizations:     Immunization History   Administered Date(s) Administered    INFLUENZA 10/25/2016, 10/24/2017, 11/01/2018, 11/24/2018, 10/24/2019    Influenza Split High Dose Preservative Free IM 10/10/2014, 10/13/2015, 10/25/2016, 11/01/2018, 11/07/2019    Influenza, high dose seasonal 0 7 mL 10/24/2019    Influenza, recombinant, quadrivalent,injectable, preservative free 11/03/2020    Influenza, seasonal, injectable 10/08/2013    Influenza, seasonal, injectable, preservative free 11/24/2018    Pneumococcal Polysaccharide PPV23 10/12/2010    SARS-CoV-2 / COVID-19 mRNA IM (Pfizer-BioNTech) 01/21/2021, 02/09/2021    Zoster 05/31/2017      Health Maintenance:         Topic Date Due    Colorectal Cancer Screening  05/01/2020    Breast Cancer Screening: Mammogram  10/17/2021    Hepatitis C Screening  Completed         Topic Date Due    Influenza Vaccine (1) 09/01/2021      Medicare Health Risk Assessment:     /60 (BP Location: Left arm)   Pulse 90   Temp (!) 96 5 °F (35 8 °C)   Resp 19   Ht 5' 1 73" (1 568 m)   Wt 56 5 kg (124 lb 9 6 oz)   LMP 07/22/1986 (LMP Unknown) Comment: hysterectomy 30 years ago  SpO2 100%   BMI 22 99 kg/m²      Donald Cazares is here for her Subsequent Wellness visit  Last Medicare Wellness visit information reviewed, patient interviewed and updates made to the record today  Health Risk Assessment:   Patient rates overall health as good  Patient feels that their physical health rating is same  Patient is very satisfied with their life  Eyesight was rated as slightly worse  Hearing was rated as same  Patient feels that their emotional and mental health rating is slightly better  Patients states they are never, rarely angry   Patient states they are never, rarely unusually tired/fatigued  Pain experienced in the last 7 days has been none  Patient states that she has experienced no weight loss or gain in last 6 months  Fall Risk Screening: In the past year, patient has experienced: no history of falling in past year      Urinary Incontinence Screening:   Patient has not leaked urine accidently in the last six months  Home Safety:  Patient does not have trouble with stairs inside or outside of their home  Patient has working smoke alarms and has working carbon monoxide detector  Home safety hazards include: none  Nutrition:   Current diet is Regular  Medications:   Patient is not currently taking any over-the-counter supplements  Patient is able to manage medications  Activities of Daily Living (ADLs)/Instrumental Activities of Daily Living (IADLs):   Walk and transfer into and out of bed and chair?: Yes  Dress and groom yourself?: Yes    Bathe or shower yourself?: Yes    Feed yourself?  Yes  Do your laundry/housekeeping?: Yes  Manage your money, pay your bills and track your expenses?: Yes  Make your own meals?: Yes    Do your own shopping?: Yes    Previous Hospitalizations:   Any hospitalizations or ED visits within the last 12 months?: No      Advance Care Planning:   Living will: No    Durable POA for healthcare: No    Advanced directive: No    Advanced directive counseling given: Yes    Five wishes given: Yes    Patient declined ACP directive: No    End of Life Decisions reviewed with patient: Yes      Cognitive Screening:   Provider or family/friend/caregiver concerned regarding cognition?: No    PREVENTIVE SCREENINGS      Cardiovascular Screening:    General: History Lipid Disorder and Screening Current      Diabetes Screening:     General: Screening Current      Colorectal Cancer Screening:     General: Screening Current      Breast Cancer Screening:     General: Screening Current      Cervical Cancer Screening:    General: Screening Not Indicated Osteoporosis Screening:      Due for: DXA Axial      Abdominal Aortic Aneurysm (AAA) Screening:        General: Screening Not Indicated      Lung Cancer Screening:     General: Screening Not Indicated      Hepatitis C Screening:    General: Screening Current    Screening, Brief Intervention, and Referral to Treatment (SBIRT)    Screening  Typical number of drinks in a day: 0  Typical number of drinks in a week: 0  Interpretation: Low risk drinking behavior  Single Item Drug Screening:  How often have you used an illegal drug (including marijuana) or a prescription medication for non-medical reasons in the past year? never    Single Item Drug Screen Score: 0  Interpretation: Negative screen for possible drug use disorder    Brief Intervention  Alcohol & drug use screenings were reviewed  No concerns regarding substance use disorder identified  Other Counseling Topics:   Car/seat belt/driving safety, skin self-exam, sunscreen and calcium and vitamin D intake and regular weightbearing exercise  IRAM Britton Assessment/Plan:    No problem-specific Assessment & Plan notes found for this encounter           Problem List Items Addressed This Visit        Digestive    Acid reflux disease    Relevant Orders    Lipid Panel with Direct LDL reflex    TSH, 3rd generation with Free T4 reflex    CBC and differential    Comprehensive metabolic panel       Endocrine    Thyroid nodule    Relevant Orders    Lipid Panel with Direct LDL reflex    TSH, 3rd generation with Free T4 reflex    CBC and differential    Comprehensive metabolic panel       Cardiovascular and Mediastinum    Essential hypertension    Relevant Orders    Lipid Panel with Direct LDL reflex    TSH, 3rd generation with Free T4 reflex    CBC and differential    Comprehensive metabolic panel       Other    Vitamin D deficiency    Relevant Orders    Lipid Panel with Direct LDL reflex    TSH, 3rd generation with Free T4 reflex    CBC and differential Comprehensive metabolic panel    Hyperlipidemia    Relevant Orders    Lipid Panel with Direct LDL reflex    TSH, 3rd generation with Free T4 reflex    CBC and differential    Comprehensive metabolic panel      Other Visit Diagnoses     Medicare annual wellness visit, subsequent    -  Primary    Postmenopausal        Relevant Orders    DXA bone density spine hip and pelvis    Lipid Panel with Direct LDL reflex    TSH, 3rd generation with Free T4 reflex    CBC and differential    Comprehensive metabolic panel            Subjective:      Patient ID: Charmaine Franz is a 68 y o  female  Here for medicare wellness   Here for f/u to chronic medical conditions  Feeling well  bp stable  Labs reviewed  Tolerating meds without side affects  No dexa scan  Overdue for mammogram        The following portions of the patient's history were reviewed and updated as appropriate: allergies, current medications, past family history, past medical history, past social history, past surgical history and problem list     Review of Systems   Constitutional: Negative for fatigue and fever  HENT: Negative for congestion and postnasal drip  Eyes: Negative for photophobia and visual disturbance  Respiratory: Negative for cough and shortness of breath  Cardiovascular: Negative for chest pain and palpitations  Gastrointestinal: Negative for constipation and diarrhea  Genitourinary: Negative for dysuria and frequency  Musculoskeletal: Negative for arthralgias and myalgias  Skin: Negative for rash  Neurological: Negative for dizziness, light-headedness and headaches  Hematological: Negative for adenopathy  Psychiatric/Behavioral: Negative for dysphoric mood and sleep disturbance  The patient is not nervous/anxious            Objective:      /60 (BP Location: Left arm)   Pulse 90   Temp (!) 96 5 °F (35 8 °C)   Resp 19   Ht 5' 1 73" (1 568 m)   Wt 56 5 kg (124 lb 9 6 oz)   LMP 07/22/1986 (LMP Unknown) Comment: hysterectomy 30 years ago  SpO2 100%   BMI 22 99 kg/m²          Physical Exam  Vitals and nursing note reviewed  Constitutional:       Appearance: Normal appearance  HENT:      Head: Normocephalic and atraumatic  Right Ear: Tympanic membrane, ear canal and external ear normal       Left Ear: Tympanic membrane, ear canal and external ear normal       Nose: Nose normal       Mouth/Throat:      Mouth: Mucous membranes are moist       Pharynx: Oropharynx is clear  Eyes:      Extraocular Movements: Extraocular movements intact  Conjunctiva/sclera: Conjunctivae normal       Pupils: Pupils are equal, round, and reactive to light  Cardiovascular:      Rate and Rhythm: Normal rate and regular rhythm  Pulses: Normal pulses  Heart sounds: Normal heart sounds  Pulmonary:      Effort: Pulmonary effort is normal       Breath sounds: Normal breath sounds  Abdominal:      General: Bowel sounds are normal       Palpations: Abdomen is soft  Musculoskeletal:         General: Normal range of motion  Cervical back: Normal range of motion and neck supple  Skin:     General: Skin is warm and dry  Capillary Refill: Capillary refill takes less than 2 seconds  Neurological:      General: No focal deficit present  Mental Status: She is alert and oriented to person, place, and time  Psychiatric:         Mood and Affect: Mood normal          Behavior: Behavior normal          Thought Content:  Thought content normal          Judgment: Judgment normal

## 2021-08-18 ENCOUNTER — TELEPHONE (OUTPATIENT)
Dept: FAMILY MEDICINE CLINIC | Facility: CLINIC | Age: 81
End: 2021-08-18

## 2021-08-18 NOTE — TELEPHONE ENCOUNTER
Patients  called daughter in law tested positive for covid, last with her on Sunday, unmasked, and less then 6ft , pt is vaccinated, and has no symptoms, he wanted to know if she should be tested  Patient advised he would have to wait till day 5 after exposure   Please advise

## 2021-08-20 PROCEDURE — U0005 INFEC AGEN DETEC AMPLI PROBE: HCPCS | Performed by: NURSE PRACTITIONER

## 2021-08-20 PROCEDURE — U0003 INFECTIOUS AGENT DETECTION BY NUCLEIC ACID (DNA OR RNA); SEVERE ACUTE RESPIRATORY SYNDROME CORONAVIRUS 2 (SARS-COV-2) (CORONAVIRUS DISEASE [COVID-19]), AMPLIFIED PROBE TECHNIQUE, MAKING USE OF HIGH THROUGHPUT TECHNOLOGIES AS DESCRIBED BY CMS-2020-01-R: HCPCS | Performed by: NURSE PRACTITIONER

## 2021-08-23 DIAGNOSIS — I10 ESSENTIAL HYPERTENSION: ICD-10-CM

## 2021-08-23 RX ORDER — LOSARTAN POTASSIUM 100 MG/1
TABLET ORAL
Qty: 90 TABLET | Refills: 3 | Status: SHIPPED | OUTPATIENT
Start: 2021-08-23 | End: 2022-08-09 | Stop reason: SDUPTHER

## 2021-10-23 ENCOUNTER — IMMUNIZATIONS (OUTPATIENT)
Dept: FAMILY MEDICINE CLINIC | Facility: HOSPITAL | Age: 81
End: 2021-10-23

## 2021-10-23 DIAGNOSIS — Z23 ENCOUNTER FOR IMMUNIZATION: Primary | ICD-10-CM

## 2021-10-23 PROCEDURE — 0001A COVID-19 PFIZER VACC 0.3 ML: CPT

## 2021-10-23 PROCEDURE — 91300 COVID-19 PFIZER VACC 0.3 ML: CPT

## 2021-11-12 ENCOUNTER — NEW PATIENT (OUTPATIENT)
Dept: URBAN - METROPOLITAN AREA CLINIC 6 | Facility: CLINIC | Age: 81
End: 2021-11-12

## 2021-11-12 DIAGNOSIS — H04.123: ICD-10-CM

## 2021-11-12 DIAGNOSIS — H25.813: ICD-10-CM

## 2021-11-12 DIAGNOSIS — H35.363: ICD-10-CM

## 2021-11-12 PROCEDURE — 92004 COMPRE OPH EXAM NEW PT 1/>: CPT

## 2021-11-12 PROCEDURE — G8427 DOCREV CUR MEDS BY ELIG CLIN: HCPCS

## 2021-11-12 PROCEDURE — 92134 CPTRZ OPH DX IMG PST SGM RTA: CPT

## 2021-11-12 ASSESSMENT — VISUAL ACUITY
OD_GLARE: 20/400
OD_CC: J1
OS_GLARE: 20/100
OS_CC: J2
OS_CC: 20/50-1
OD_CC: 20/40

## 2021-11-12 ASSESSMENT — KERATOMETRY
OS_K1POWER_DIOPTERS: 42.00
OD_K2POWER_DIOPTERS: 43.25
OD_K1POWER_DIOPTERS: 42.50
OS_AXISANGLE_DEGREES: 086
OS_K2POWER_DIOPTERS: 43.00
OD_AXISANGLE2_DEGREES: 23
OS_AXISANGLE2_DEGREES: 176
OD_AXISANGLE_DEGREES: 113

## 2021-11-12 ASSESSMENT — TONOMETRY
OD_IOP_MMHG: 13
OS_IOP_MMHG: 12

## 2021-12-14 ENCOUNTER — PRE-OP CATARACT MEASUREMENTS (OUTPATIENT)
Dept: URBAN - METROPOLITAN AREA CLINIC 6 | Facility: CLINIC | Age: 81
End: 2021-12-14

## 2021-12-14 DIAGNOSIS — H25.813: ICD-10-CM

## 2021-12-14 PROCEDURE — G8427 DOCREV CUR MEDS BY ELIG CLIN: HCPCS

## 2021-12-14 PROCEDURE — 92012 INTRM OPH EXAM EST PATIENT: CPT

## 2021-12-14 PROCEDURE — 92136 OPHTHALMIC BIOMETRY: CPT

## 2021-12-14 ASSESSMENT — KERATOMETRY
OS_AXISANGLE2_DEGREES: 176
OD_K1POWER_DIOPTERS: 42.50
OD_AXISANGLE_DEGREES: 113
OS_AXISANGLE_DEGREES: 086
OS_K2POWER_DIOPTERS: 43.00
OD_AXISANGLE2_DEGREES: 23
OD_K2POWER_DIOPTERS: 43.25
OS_K1POWER_DIOPTERS: 42.00

## 2021-12-14 ASSESSMENT — VISUAL ACUITY
OS_GLARE: 20/70
OD_CC: 20/50+1
OS_CC: 20/50
OD_GLARE: 20/70-1
OU_CC: J3

## 2021-12-14 ASSESSMENT — TONOMETRY
OS_IOP_MMHG: 15
OD_IOP_MMHG: 14

## 2021-12-29 ENCOUNTER — RA CDI HCC (OUTPATIENT)
Dept: OTHER | Facility: HOSPITAL | Age: 81
End: 2021-12-29

## 2022-01-04 ENCOUNTER — OFFICE VISIT (OUTPATIENT)
Dept: FAMILY MEDICINE CLINIC | Facility: CLINIC | Age: 82
End: 2022-01-04
Payer: COMMERCIAL

## 2022-01-04 VITALS
BODY MASS INDEX: 23.08 KG/M2 | TEMPERATURE: 96.5 F | SYSTOLIC BLOOD PRESSURE: 138 MMHG | OXYGEN SATURATION: 95 % | HEART RATE: 70 BPM | RESPIRATION RATE: 20 BRPM | HEIGHT: 62 IN | WEIGHT: 125.4 LBS | DIASTOLIC BLOOD PRESSURE: 70 MMHG

## 2022-01-04 DIAGNOSIS — K21.9 GASTROESOPHAGEAL REFLUX DISEASE WITHOUT ESOPHAGITIS: ICD-10-CM

## 2022-01-04 DIAGNOSIS — Z01.818 PREOP EXAMINATION: Primary | ICD-10-CM

## 2022-01-04 DIAGNOSIS — E78.2 MIXED HYPERLIPIDEMIA: ICD-10-CM

## 2022-01-04 DIAGNOSIS — H25.9 AGE-RELATED CATARACT OF BOTH EYES, UNSPECIFIED AGE-RELATED CATARACT TYPE: ICD-10-CM

## 2022-01-04 DIAGNOSIS — I10 ESSENTIAL HYPERTENSION: ICD-10-CM

## 2022-01-04 PROCEDURE — 1101F PT FALLS ASSESS-DOCD LE1/YR: CPT | Performed by: NURSE PRACTITIONER

## 2022-01-04 PROCEDURE — 3725F SCREEN DEPRESSION PERFORMED: CPT | Performed by: NURSE PRACTITIONER

## 2022-01-04 PROCEDURE — 99214 OFFICE O/P EST MOD 30 MIN: CPT | Performed by: NURSE PRACTITIONER

## 2022-01-04 PROCEDURE — 1160F RVW MEDS BY RX/DR IN RCRD: CPT | Performed by: NURSE PRACTITIONER

## 2022-01-04 PROCEDURE — 3288F FALL RISK ASSESSMENT DOCD: CPT | Performed by: NURSE PRACTITIONER

## 2022-01-04 PROCEDURE — 3075F SYST BP GE 130 - 139MM HG: CPT | Performed by: NURSE PRACTITIONER

## 2022-01-04 PROCEDURE — 1036F TOBACCO NON-USER: CPT | Performed by: NURSE PRACTITIONER

## 2022-01-04 PROCEDURE — 3078F DIAST BP <80 MM HG: CPT | Performed by: NURSE PRACTITIONER

## 2022-01-04 NOTE — PROGRESS NOTES
FAMILY PRACTICE OFFICE VISIT       NAME: Angel Ashley  AGE: 80 y o  SEX: female       : 1940        MRN: [id number]    DATE: 2022  TIME: 11:03 AM    Assessment and Plan   There are no diagnoses linked to this encounter  Chief Complaint     Chief Complaint   Patient presents with    Pre-op Exam       History of Present Illness   Angel Ashley is a 80y o -year-old female who ***    Review of Systems   Review of Systems    Active Problem List     Patient Active Problem List   Diagnosis    Acid reflux disease    Essential hypertension    Hyperlipidemia    Spondylosis of cervical region without myelopathy or radiculopathy    Thyroid nodule    Vitamin D deficiency         Past Medical History:  Past Medical History:   Diagnosis Date    GERD (gastroesophageal reflux disease)     Hyperlipidemia     Hypertension     Spinal stenosis     Trapezius muscle spasm     last assessed - [id number]       Past Surgical History:  Past Surgical History:   Procedure Laterality Date    BREAST CYST ASPIRATION Right 2010    DEBRIDEMENT TENNIS ELBOW      ELBOW SURGERY Right     tennis elbow    FASCIOTOMY ELBOW Right     of the lateral epicondyle; last assessed - 2014    HYSTERECTOMY      WIN-BSO    OOPHORECTOMY Bilateral     UT COLONOSCOPY FLX DX W/COLLJ SPEC WHEN PFRMD N/A 2017    Procedure: EGD AND COLONOSCOPY;  Surgeon: Diego Hassan MD;  Location: [facility];   Service: Gastroenterology       Family History:  Family History   Problem Relation Age of Onset    Cancer Sister     Colon cancer Sister 71    Diabetes Sister     Melanoma Mother     Cirrhosis Father         alcoholic    Stroke Father         CVA (cerebral vascular accident)    Arrhythmia Father         pacemaker placement    No Known Problems Maternal Grandmother     No Known Problems Maternal Grandfather     No Known Problems Paternal Grandmother     No Known Problems Paternal Grandfather     No Known Problems Sister     No Known Problems Sister     No Known Problems Sister     No Known Problems Sister     No Known Problems Maternal Aunt     No Known Problems Maternal Aunt     No Known Problems Maternal Aunt     No Known Problems Maternal Aunt     No Known Problems Maternal Aunt     No Known Problems Maternal Aunt     No Known Problems Paternal Aunt     No Known Problems Paternal Aunt     No Known Problems Paternal Aunt        Social History:  Social History     Socioeconomic History    Marital status: /Civil Union     Spouse name: Not on file    Number of children: 2    Years of education: Not on file    Highest education level: Not on file   Occupational History    Occupation: Retired -  - [employer]   Tobacco Use    Smoking status: Former Smoker     Packs/day: 0 50     Quit date: 2016     Years since quittin 5    Smokeless tobacco: Former User    Tobacco comment: Quit 2014 -  ppd for 40 years per [employer]   Substance and Sexual Activity    Alcohol use: No     Comment: Consumes alcohol occasionally per [employer]    Drug use: No    Sexual activity: Not on file   Other Topics Concern    Not on file   Social History Narrative    Two children - [address] [place] [id number]     Social Determinants of Health     Financial Resource Strain: Not on file   Food Insecurity: Not on file   Transportation Needs: Not on file   Physical Activity: Not on file   Stress: Not on file   Social Connections: Not on file   Intimate Partner Violence: Not on file   Housing Stability: Not on file       Objective     Vitals:    22 1057   BP: 170/62   Pulse: 70   Resp: 20   Temp: (!) 96 5 °F (35 8 °C)   SpO2: 95%     Wt Readings from Last 3 Encounters:   22 56 9 kg (125 lb 6 4 oz)   21 56 5 kg (124 lb 9 6 oz)   21 56 6 kg (124 lb 12 8 oz)       Physical Exam    Pertinent Laboratory/Diagnostic Studies:  Lab Results   Component Value Date    GLUCOSE 178 (H) 2016    BUN 12 07/27/2021    CREATININE 0 79 07/27/2021    CALCIUM 9 8 07/27/2021     10/11/2016    K 4 4 07/27/2021    CO2 29 07/27/2021     07/27/2021     Lab Results   Component Value Date    ALT 9 07/27/2021    AST 17 07/27/2021    ALKPHOS 92 07/27/2021    BILITOT 0 4 10/11/2016       Lab Results   Component Value Date    WBC 4 2 07/27/2021    HGB 14 6 07/27/2021    HCT 42 4 07/27/2021    MCV 94 9 07/27/2021     07/27/2021       No results found for: TSH    Lab Results   Component Value Date    CHOL 251 (H) 10/11/2016     Lab Results   Component Value Date    TRIG 92 07/27/2021     Lab Results   Component Value Date    HDL 71 07/27/2021     Lab Results   Component Value Date    LDLCALC 125 (H) 07/27/2021     No results found for: HGBA1C    Results for orders placed or performed in visit on 08/20/21   Novel Coronavirus (Covid-19),PCR [facility] or [facility]    Specimen: Nose; Nares   Result Value Ref Range    SARS-CoV-2 Negative Negative       No orders of the defined types were placed in this encounter  ALLERGIES:  Allergies   Allergen Reactions    Atorvastatin Edema    Other      Cholesterol med- name unknown and pneumonia vaccine    Pneumococcal Vaccines Edema       Current Medications     Current Outpatient Medications   Medication Sig Dispense Refill    Cholecalciferol (VITAMIN D) 2000 UNITS CAPS Take by mouth   losartan (COZAAR) 100 MG tablet TAKE 1 TABLET BY MOUTH EVERY DAY 90 tablet 3    omeprazole (PriLOSEC) 40 MG capsule TAKE 1 CAPSULE EVERY DAY 90 capsule 3     No current facility-administered medications for this visit           Health Maintenance     Health Maintenance   Topic Date Due    SLP PLAN OF CARE  Never done    Osteoporosis Screening  Never done    Colorectal Cancer Screening  05/01/2020    Breast Cancer Screening: Mammogram  10/17/2021    DTaP,Tdap,and Td Vaccines (1 - Tdap) 08/02/2022 (Originally 12/2/1961)    Fall Risk  08/02/2022    Medicare Annual Wellness Visit (AWV)  08/02/2022    Depression Screening  01/04/2023    BMI: Adult  01/04/2023    Hepatitis C Screening  Completed    Pneumococcal Vaccine: 65+ Years  Completed    Influenza Vaccine  Completed    COVID-19 Vaccine  Completed    HIB Vaccine  Aged Out    Hepatitis B Vaccine  Aged Out    IPV Vaccine  Aged Out    Hepatitis A Vaccine  Aged Out    Meningococcal ACWY Vaccine  Aged Out    HPV Vaccine  Aged Out     Immunization History   Administered Date(s) Administered    COVID-19 PFIZER VACCINE 0 3 ML IM 01/21/2021, 02/09/2021, 10/23/2021    INFLUENZA 10/25/2016, 10/24/2017, 11/01/2018, 11/24/2018, 10/24/2019, 11/09/2021    Influenza Split High Dose Preservative Free IM 10/10/2014, 10/13/2015, 10/25/2016, 11/01/2018, 11/07/2019    Influenza, high dose seasonal 0 7 mL 10/24/2019    Influenza, recombinant, quadrivalent,injectable, preservative free 11/03/2020    Influenza, seasonal, injectable 10/08/2013    Influenza, seasonal, injectable, preservative free 11/24/2018    Pneumococcal Polysaccharide PPV23 10/12/2010    Zoster 05/31/2017          IRAM Higginbotham

## 2022-01-04 NOTE — PROGRESS NOTES
Subjective:     Viktoriya Goldman is a 80 y o  female who presents to the office today for a preoperative consultation at the request of surgeon Dr Enrique Trinidad who plans on performing bilat cataract on January 10 and January 31, 2022  This consultation is requested for the specific conditions prompting preoperative evaluation (i e  because of potential affect on operative risk): hypertension  Planned anesthesia: local  The patient has the following known anesthesia issues: none  Patients bleeding risk: no recent abnormal bleeding  Patient does not have objections to receiving blood products if needed      The following portions of the patient's history were reviewed and updated as appropriate: allergies, current medications, past family history, past medical history, past social history, past surgical history and problem list     Review of Systems  Constitutional: negative  Eyes: negative  Ears, nose, mouth, throat, and face: negative  Respiratory: negative  Cardiovascular: negative  Gastrointestinal: negative  Genitourinary:negative  Integument/breast: negative  Hematologic/lymphatic: negative  Musculoskeletal:negative  Neurological: negative  Behavioral/Psych: negative  Endocrine: negative  Allergic/Immunologic: negative   Eyes:  Positive for bilat cataracts  Objective:     /70 (BP Location: Left arm)   Pulse 70   Temp (!) 96 5 °F (35 8 °C)   Resp 20   Ht 5' 1 73" (1 568 m)   Wt 56 9 kg (125 lb 6 4 oz)   LMP 07/22/1986 (LMP Unknown) Comment: hysterectomy 30 years ago  SpO2 95%   BMI 23 14 kg/m²   Family History   Problem Relation Age of Onset    Cancer Sister     Colon cancer Sister 71    Diabetes Sister     Melanoma Mother     Cirrhosis Father         alcoholic    Stroke Father         CVA (cerebral vascular accident)    Arrhythmia Father         pacemaker placement    No Known Problems Maternal Grandmother     No Known Problems Maternal Grandfather     No Known Problems Paternal Grandmother    Rubens Mario No Known Problems Paternal Grandfather     No Known Problems Sister     No Known Problems Sister     No Known Problems Sister     No Known Problems Sister     No Known Problems Maternal Aunt     No Known Problems Maternal Aunt     No Known Problems Maternal Aunt     No Known Problems Maternal Aunt     No Known Problems Maternal Aunt     No Known Problems Maternal Aunt     No Known Problems Paternal Aunt     No Known Problems Paternal Aunt     No Known Problems Paternal Aunt      Past Medical History:   Diagnosis Date    GERD (gastroesophageal reflux disease)     Hyperlipidemia     Hypertension     Spinal stenosis     Trapezius muscle spasm     last assessed - [id number]     Social History     Socioeconomic History    Marital status: /Civil Union     Spouse name: Not on file    Number of children: 2    Years of education: Not on file    Highest education level: Not on file   Occupational History    Occupation: Retired -  - [employer]   Tobacco Use    Smoking status: Former Smoker     Packs/day: 0 50     Quit date: 2016     Years since quittin 5    Smokeless tobacco: Former User    Tobacco comment: Quit 2014 -  ppd for 40 years per [employer]   Substance and Sexual Activity    Alcohol use: No     Comment: Consumes alcohol occasionally per [employer]    Drug use: No    Sexual activity: Not on file   Other Topics Concern    Not on file   Social History Narrative    Two children - [address]     Social Determinants of Health     Financial Resource Strain: Not on file   Food Insecurity: Not on file   Transportation Needs: Not on file   Physical Activity: Not on file   Stress: Not on file   Social Connections: Not on file   Intimate Partner Violence: Not on file   Housing Stability: Not on file       Current Outpatient Medications:     Cholecalciferol (VITAMIN D) 2000 UNITS CAPS, Take by mouth , Disp: , Rfl:     losartan (COZAAR) 100 MG tablet, TAKE 1 TABLET BY MOUTH EVERY DAY, Disp: 90 tablet, Rfl: 3    omeprazole (PriLOSEC) 40 MG capsule, TAKE 1 CAPSULE EVERY DAY, Disp: 90 capsule, Rfl: 3  Allergies   Allergen Reactions    Atorvastatin Edema    Other      Cholesterol med- name unknown and pneumonia vaccine    Pneumococcal Vaccines Edema     General Appearance:    Alert, cooperative, no distress, appears stated age   Head:    Normocephalic, without obvious abnormality, atraumatic   Eyes:    PERRL, conjunctiva/corneas clear, EOM's intact, fundi     benign, both eyes   Ears:    Normal TM's and external ear canals, both ears   Nose:   Nares normal, septum midline, mucosa normal, no drainage    or sinus tenderness   Throat:   Lips, mucosa, and tongue normal; teeth and gums normal   Neck:   Supple, symmetrical, trachea midline, no adenopathy;     thyroid:  no enlargement/tenderness/nodules; no carotid    bruit or JVD   Back:     Symmetric, no curvature, ROM normal, no CVA tenderness   Lungs:     Clear to auscultation bilaterally, respirations unlabored   Chest Wall:    No tenderness or deformity    Heart:    Regular rate and rhythm, S1 and S2 normal, no murmur, rub   or gallop   Breast Exam:    No tenderness, masses, or nipple abnormality   Abdomen:     Soft, non-tender, bowel sounds active all four quadrants,     no masses, no organomegaly   Genitalia:    Normal female without lesion, discharge or tenderness   Rectal:    Normal tone, no masses or tenderness; guaiac negative stool   Extremities:   Extremities normal, atraumatic, no cyanosis or edema   Pulses:   2+ and symmetric all extremities   Skin:   Skin color, texture, turgor normal, no rashes or lesions   Lymph nodes:   Cervical, supraclavicular, and axillary nodes normal   Neurologic:   CNII-XII intact, normal strength, sensation and reflexes     throughout       Predictors of intubation difficulty:   Morbid obesity? no   Anatomically abnormal facies? no   Prominent incisors? no   Receding mandible?  no Short, thick neck? no   Neck range of motion: normal   Mallampati score: I (soft palate, uvula, fauces, and tonsillar pillars visible)   Thyromental distance: < 6cm   Mouth openincm   Dentition: dentures full upper and lower    Cardiographics  ECG: not performed  Echocardiogram: not done    Imaging  Chest x-ray: not performed     Lab Review   No visits with results within 2 Month(s) from this visit  Latest known visit with results is:   Orders Only on 2021   Component Date Value    SARS-CoV-2 2021 Negative         Assessment:     80 y o  female with planned surgery as above  Known risk factors for perioperative complications: None    Difficulty with intubation is not anticipated  Donovan Marcial was seen today for pre-op exam     Diagnoses and all orders for this visit:    Preop examination    Age-related cataract of both eyes, unspecified age-related cataract type    Essential hypertension    Gastroesophageal reflux disease without esophagitis    Mixed hyperlipidemia      Cardiac Risk Estimation: low    Current medications which may produce withdrawal symptoms if withheld perioperatively: none      Plan:  Medically cleared for surgery     1  Preoperative workup as follows none  2  Change in medication regimen before surgery: none, continue medication regimen including morning of surgery, with sip of water  3  Prophylaxis for cardiac events with perioperative beta-blockers: not indicated  4  Invasive hemodynamic monitoring perioperatively: not indicated  5  Deep vein thrombosis prophylaxis postoperatively:none  6  Surveillance for postoperative MI with ECG immediately postoperatively and on postoperative days 1 and 2 AND troponin levels 24 hours postoperatively and on day 4 or hospital discharge (whichever comes first): not indicated    7  Other measures: none

## 2022-01-07 ENCOUNTER — DOCUMENTATION (OUTPATIENT)
Dept: FAMILY MEDICINE CLINIC | Facility: CLINIC | Age: 82
End: 2022-01-07

## 2022-01-07 ENCOUNTER — TELEPHONE (OUTPATIENT)
Dept: FAMILY MEDICINE CLINIC | Facility: CLINIC | Age: 82
End: 2022-01-07

## 2022-01-10 ENCOUNTER — SURGERY/PROCEDURE (OUTPATIENT)
Dept: URBAN - METROPOLITAN AREA SURGICAL CENTER 6 | Facility: SURGICAL CENTER | Age: 82
End: 2022-01-10

## 2022-01-10 DIAGNOSIS — H25.811: ICD-10-CM

## 2022-01-10 PROBLEM — Z96.1 PC IOL: Noted: 2022-01-10

## 2022-01-10 PROCEDURE — 66984 XCAPSL CTRC RMVL W/O ECP: CPT

## 2022-01-11 ENCOUNTER — 1 DAY POST-OP (OUTPATIENT)
Dept: URBAN - METROPOLITAN AREA CLINIC 6 | Facility: CLINIC | Age: 82
End: 2022-01-11

## 2022-01-11 DIAGNOSIS — Z96.1: ICD-10-CM

## 2022-01-11 PROCEDURE — 99024 POSTOP FOLLOW-UP VISIT: CPT

## 2022-01-11 ASSESSMENT — KERATOMETRY
OD_K1POWER_DIOPTERS: 42.50
OS_K1POWER_DIOPTERS: 42.00
OS_AXISANGLE2_DEGREES: 176
OS_AXISANGLE_DEGREES: 086
OS_K2POWER_DIOPTERS: 43.00
OD_AXISANGLE_DEGREES: 113
OD_K1POWER_DIOPTERS: 42.50
OS_K1POWER_DIOPTERS: 42.00
OD_K2POWER_DIOPTERS: 43.25
OD_AXISANGLE2_DEGREES: 23
OS_AXISANGLE_DEGREES: 086
OD_AXISANGLE2_DEGREES: 23
OD_K2POWER_DIOPTERS: 43.25
OS_K2POWER_DIOPTERS: 43.00
OS_AXISANGLE2_DEGREES: 176
OD_AXISANGLE_DEGREES: 113

## 2022-01-11 ASSESSMENT — TONOMETRY
OS_IOP_MMHG: 15
OD_IOP_MMHG: 18

## 2022-01-11 ASSESSMENT — VISUAL ACUITY
OD_OTHER: 1 DAY POST OP
OD_SC: 20/30+2

## 2022-01-12 ENCOUNTER — PROBLEM (OUTPATIENT)
Dept: URBAN - METROPOLITAN AREA CLINIC 6 | Facility: CLINIC | Age: 82
End: 2022-01-12

## 2022-01-12 DIAGNOSIS — Z96.1: ICD-10-CM

## 2022-01-12 PROCEDURE — 99024 POSTOP FOLLOW-UP VISIT: CPT

## 2022-01-12 ASSESSMENT — KERATOMETRY
OD_K1POWER_DIOPTERS: 42.50
OD_AXISANGLE2_DEGREES: 23
OS_K2POWER_DIOPTERS: 43.00
OD_K2POWER_DIOPTERS: 43.25
OS_K1POWER_DIOPTERS: 42.00
OS_AXISANGLE2_DEGREES: 176
OD_AXISANGLE_DEGREES: 113
OS_AXISANGLE_DEGREES: 086

## 2022-01-12 ASSESSMENT — TONOMETRY
OD_IOP_MMHG: 15
OS_IOP_MMHG: 13

## 2022-01-12 ASSESSMENT — VISUAL ACUITY: OD_SC: 20/30-1

## 2022-01-18 ENCOUNTER — 1 WEEK POST-OP (OUTPATIENT)
Dept: URBAN - METROPOLITAN AREA CLINIC 6 | Facility: CLINIC | Age: 82
End: 2022-01-18

## 2022-01-18 DIAGNOSIS — H25.812: ICD-10-CM

## 2022-01-18 DIAGNOSIS — Z96.1: ICD-10-CM

## 2022-01-18 PROCEDURE — 92136 OPHTHALMIC BIOMETRY: CPT | Mod: 26

## 2022-01-18 PROCEDURE — 99024 POSTOP FOLLOW-UP VISIT: CPT

## 2022-01-18 ASSESSMENT — TONOMETRY
OD_IOP_MMHG: 18
OS_IOP_MMHG: 15

## 2022-01-18 ASSESSMENT — KERATOMETRY
OS_AXISANGLE2_DEGREES: 176
OS_K2POWER_DIOPTERS: 43.00
OD_K1POWER_DIOPTERS: 42.50
OS_AXISANGLE_DEGREES: 086
OD_K2POWER_DIOPTERS: 43.25
OS_K1POWER_DIOPTERS: 42.00
OD_AXISANGLE_DEGREES: 113
OD_AXISANGLE2_DEGREES: 23

## 2022-01-18 ASSESSMENT — VISUAL ACUITY
OD_SC: 20/30
OS_CC: J5
OS_CC: 20/60

## 2022-01-31 ENCOUNTER — SURGERY/PROCEDURE (OUTPATIENT)
Dept: URBAN - METROPOLITAN AREA SURGICAL CENTER 6 | Facility: SURGICAL CENTER | Age: 82
End: 2022-01-31

## 2022-01-31 DIAGNOSIS — H25.812: ICD-10-CM

## 2022-01-31 PROCEDURE — 66984 XCAPSL CTRC RMVL W/O ECP: CPT | Mod: 79,LT

## 2022-02-01 ENCOUNTER — 1 DAY POST-OP (OUTPATIENT)
Dept: URBAN - METROPOLITAN AREA CLINIC 6 | Facility: CLINIC | Age: 82
End: 2022-02-01

## 2022-02-01 DIAGNOSIS — Z96.1: ICD-10-CM

## 2022-02-01 PROCEDURE — 99024 POSTOP FOLLOW-UP VISIT: CPT

## 2022-02-01 ASSESSMENT — VISUAL ACUITY
OS_PH: 20/40
OD_SC: 20/30-1
OS_SC: 20/40-2

## 2022-02-01 ASSESSMENT — KERATOMETRY
OD_K1POWER_DIOPTERS: 42.50
OS_AXISANGLE_DEGREES: 086
OD_K1POWER_DIOPTERS: 42.50
OD_AXISANGLE_DEGREES: 113
OS_AXISANGLE_DEGREES: 086
OD_AXISANGLE_DEGREES: 113
OD_AXISANGLE2_DEGREES: 23
OS_K2POWER_DIOPTERS: 43.00
OS_K1POWER_DIOPTERS: 42.00
OS_AXISANGLE2_DEGREES: 176
OD_AXISANGLE2_DEGREES: 23
OD_K2POWER_DIOPTERS: 43.25
OS_AXISANGLE2_DEGREES: 176
OS_K2POWER_DIOPTERS: 43.00
OD_K2POWER_DIOPTERS: 43.25
OS_K1POWER_DIOPTERS: 42.00

## 2022-02-01 ASSESSMENT — TONOMETRY
OS_IOP_MMHG: 23
OD_IOP_MMHG: 15

## 2022-02-02 ENCOUNTER — 1 DAY POST-OP (OUTPATIENT)
Dept: URBAN - METROPOLITAN AREA CLINIC 6 | Facility: CLINIC | Age: 82
End: 2022-02-02

## 2022-02-02 DIAGNOSIS — Z96.1: ICD-10-CM

## 2022-02-02 PROCEDURE — 99024 POSTOP FOLLOW-UP VISIT: CPT

## 2022-02-02 ASSESSMENT — VISUAL ACUITY: OS_SC: 20/30

## 2022-02-02 ASSESSMENT — KERATOMETRY
OS_K2POWER_DIOPTERS: 43.00
OD_AXISANGLE2_DEGREES: 23
OD_AXISANGLE_DEGREES: 113
OS_AXISANGLE_DEGREES: 086
OS_K1POWER_DIOPTERS: 42.00
OD_K2POWER_DIOPTERS: 43.25
OD_K1POWER_DIOPTERS: 42.50
OS_AXISANGLE2_DEGREES: 176

## 2022-02-02 ASSESSMENT — TONOMETRY: OS_IOP_MMHG: 15

## 2022-02-10 ENCOUNTER — 1 WEEK POST-OP (OUTPATIENT)
Dept: URBAN - METROPOLITAN AREA CLINIC 6 | Facility: CLINIC | Age: 82
End: 2022-02-10

## 2022-02-10 DIAGNOSIS — Z96.1: ICD-10-CM

## 2022-02-10 PROCEDURE — 99024 POSTOP FOLLOW-UP VISIT: CPT

## 2022-02-10 ASSESSMENT — KERATOMETRY
OD_AXISANGLE2_DEGREES: 23
OS_K1POWER_DIOPTERS: 42.00
OS_AXISANGLE2_DEGREES: 176
OS_AXISANGLE_DEGREES: 086
OD_AXISANGLE_DEGREES: 113
OD_K1POWER_DIOPTERS: 42.50
OS_K2POWER_DIOPTERS: 43.00
OD_K2POWER_DIOPTERS: 43.25

## 2022-02-10 ASSESSMENT — TONOMETRY
OD_IOP_MMHG: 13
OS_IOP_MMHG: 14

## 2022-02-10 ASSESSMENT — VISUAL ACUITY
OS_SC: 20/30
OD_SC: 20/30-1

## 2022-03-03 LAB
ALBUMIN SERPL-MCNC: 4.5 G/DL (ref 3.6–5.1)
ALBUMIN/GLOB SERPL: 1.7 (CALC) (ref 1–2.5)
ALP SERPL-CCNC: 103 U/L (ref 37–153)
ALT SERPL-CCNC: 11 U/L (ref 6–29)
AST SERPL-CCNC: 18 U/L (ref 10–35)
BASOPHILS # BLD AUTO: 29 CELLS/UL (ref 0–200)
BASOPHILS NFR BLD AUTO: 0.7 %
BILIRUB SERPL-MCNC: 0.5 MG/DL (ref 0.2–1.2)
BUN SERPL-MCNC: 13 MG/DL (ref 7–25)
BUN/CREAT SERPL: NORMAL (CALC) (ref 6–22)
CALCIUM SERPL-MCNC: 9.8 MG/DL (ref 8.6–10.4)
CHLORIDE SERPL-SCNC: 103 MMOL/L (ref 98–110)
CHOLEST SERPL-MCNC: 240 MG/DL
CHOLEST/HDLC SERPL: 2.7 (CALC)
CO2 SERPL-SCNC: 30 MMOL/L (ref 20–32)
CREAT SERPL-MCNC: 0.83 MG/DL (ref 0.6–0.93)
EOSINOPHIL # BLD AUTO: 42 CELLS/UL (ref 15–500)
EOSINOPHIL NFR BLD AUTO: 1 %
ERYTHROCYTE [DISTWIDTH] IN BLOOD BY AUTOMATED COUNT: 12 % (ref 11–15)
GLOBULIN SER CALC-MCNC: 2.6 G/DL (CALC) (ref 1.9–3.7)
GLUCOSE SERPL-MCNC: 89 MG/DL (ref 65–99)
HCT VFR BLD AUTO: 43.4 % (ref 35–45)
HDLC SERPL-MCNC: 90 MG/DL
HGB BLD-MCNC: 15.1 G/DL (ref 11.7–15.5)
LDLC SERPL CALC-MCNC: 131 MG/DL (CALC)
LYMPHOCYTES # BLD AUTO: 962 CELLS/UL (ref 850–3900)
LYMPHOCYTES NFR BLD AUTO: 22.9 %
MCH RBC QN AUTO: 32.8 PG (ref 27–33)
MCHC RBC AUTO-ENTMCNC: 34.8 G/DL (ref 32–36)
MCV RBC AUTO: 94.1 FL (ref 80–100)
MONOCYTES # BLD AUTO: 281 CELLS/UL (ref 200–950)
MONOCYTES NFR BLD AUTO: 6.7 %
NEUTROPHILS # BLD AUTO: 2885 CELLS/UL (ref 1500–7800)
NEUTROPHILS NFR BLD AUTO: 68.7 %
NONHDLC SERPL-MCNC: 150 MG/DL (CALC)
PLATELET # BLD AUTO: 301 THOUSAND/UL (ref 140–400)
PMV BLD REES-ECKER: 10.6 FL (ref 7.5–12.5)
POTASSIUM SERPL-SCNC: 4.4 MMOL/L (ref 3.5–5.3)
PROT SERPL-MCNC: 7.1 G/DL (ref 6.1–8.1)
RBC # BLD AUTO: 4.61 MILLION/UL (ref 3.8–5.1)
SL AMB EGFR AFRICAN AMERICAN: 78 ML/MIN/1.73M2
SL AMB EGFR NON AFRICAN AMERICAN: 67 ML/MIN/1.73M2
SODIUM SERPL-SCNC: 139 MMOL/L (ref 135–146)
TRIGL SERPL-MCNC: 86 MG/DL
TSH SERPL-ACNC: 2.03 MIU/L (ref 0.4–4.5)
WBC # BLD AUTO: 4.2 THOUSAND/UL (ref 3.8–10.8)

## 2022-03-04 ENCOUNTER — FOLLOW UP (OUTPATIENT)
Dept: URBAN - METROPOLITAN AREA CLINIC 6 | Facility: CLINIC | Age: 82
End: 2022-03-04

## 2022-03-04 DIAGNOSIS — Z96.1: ICD-10-CM

## 2022-03-04 DIAGNOSIS — H04.123: ICD-10-CM

## 2022-03-04 PROCEDURE — 99024 POSTOP FOLLOW-UP VISIT: CPT

## 2022-03-04 ASSESSMENT — KERATOMETRY
OS_K1POWER_DIOPTERS: 42.00
OD_K1POWER_DIOPTERS: 42.50
OS_AXISANGLE_DEGREES: 086
OS_K2POWER_DIOPTERS: 43.00
OS_AXISANGLE2_DEGREES: 176
OD_K2POWER_DIOPTERS: 43.25
OD_AXISANGLE_DEGREES: 113
OD_AXISANGLE2_DEGREES: 23

## 2022-03-04 ASSESSMENT — TONOMETRY
OD_IOP_MMHG: 11
OS_IOP_MMHG: 14

## 2022-03-04 ASSESSMENT — VISUAL ACUITY
OD_SC: 20/30-1
OS_SC: 20/50-2

## 2022-03-08 ENCOUNTER — OFFICE VISIT (OUTPATIENT)
Dept: FAMILY MEDICINE CLINIC | Facility: CLINIC | Age: 82
End: 2022-03-08
Payer: COMMERCIAL

## 2022-03-08 VITALS
DIASTOLIC BLOOD PRESSURE: 80 MMHG | OXYGEN SATURATION: 100 % | TEMPERATURE: 98.4 F | BODY MASS INDEX: 23.75 KG/M2 | WEIGHT: 125.8 LBS | HEART RATE: 68 BPM | SYSTOLIC BLOOD PRESSURE: 132 MMHG | HEIGHT: 61 IN | RESPIRATION RATE: 16 BRPM

## 2022-03-08 DIAGNOSIS — E78.2 MIXED HYPERLIPIDEMIA: ICD-10-CM

## 2022-03-08 DIAGNOSIS — Z12.31 ENCOUNTER FOR SCREENING MAMMOGRAM FOR MALIGNANT NEOPLASM OF BREAST: ICD-10-CM

## 2022-03-08 DIAGNOSIS — I10 ESSENTIAL HYPERTENSION: Primary | ICD-10-CM

## 2022-03-08 DIAGNOSIS — K21.9 GASTROESOPHAGEAL REFLUX DISEASE WITHOUT ESOPHAGITIS: ICD-10-CM

## 2022-03-08 PROBLEM — H25.9 AGE-RELATED CATARACT OF BOTH EYES: Status: RESOLVED | Noted: 2022-01-04 | Resolved: 2022-03-08

## 2022-03-08 PROCEDURE — 99214 OFFICE O/P EST MOD 30 MIN: CPT | Performed by: NURSE PRACTITIONER

## 2022-03-08 PROCEDURE — 3079F DIAST BP 80-89 MM HG: CPT | Performed by: NURSE PRACTITIONER

## 2022-03-08 PROCEDURE — 3075F SYST BP GE 130 - 139MM HG: CPT | Performed by: NURSE PRACTITIONER

## 2022-03-08 PROCEDURE — 1101F PT FALLS ASSESS-DOCD LE1/YR: CPT | Performed by: NURSE PRACTITIONER

## 2022-03-08 PROCEDURE — 3725F SCREEN DEPRESSION PERFORMED: CPT | Performed by: NURSE PRACTITIONER

## 2022-03-08 PROCEDURE — 1160F RVW MEDS BY RX/DR IN RCRD: CPT | Performed by: NURSE PRACTITIONER

## 2022-03-08 PROCEDURE — 4004F PT TOBACCO SCREEN RCVD TLK: CPT | Performed by: NURSE PRACTITIONER

## 2022-03-08 PROCEDURE — 3288F FALL RISK ASSESSMENT DOCD: CPT | Performed by: NURSE PRACTITIONER

## 2022-03-08 NOTE — PROGRESS NOTES
FAMILY PRACTICE OFFICE VISIT       NAME: Kyara Ludwig  AGE: 80 y o  SEX: female       : 1940        MRN: [id number]    DATE: 3/8/2022  TIME: 11:28 AM    Assessment and Plan   1  Essential hypertension  Assessment & Plan:  Stable  Cont meds      Orders:  -     Comprehensive metabolic panel; Future; Expected date: 2022  -     CBC and differential; Future; Expected date: 2022  -     Lipid Panel with Direct LDL reflex; Future; Expected date: 2022    2  Gastroesophageal reflux disease without esophagitis  Assessment & Plan:  Stable  Cont meds      Orders:  -     Comprehensive metabolic panel; Future; Expected date: 2022  -     CBC and differential; Future; Expected date: 2022  -     Lipid Panel with Direct LDL reflex; Future; Expected date: 2022    3  Mixed hyperlipidemia  Assessment & Plan:  Stable  No meds at present      Orders:  -     Comprehensive metabolic panel; Future; Expected date: 2022  -     CBC and differential; Future; Expected date: 2022  -     Lipid Panel with Direct LDL reflex; Future; Expected date: 2022    4  Encounter for screening mammogram for malignant neoplasm of breast  -     Mammo screening bilateral w cad; Future; Expected date: 2022               Chief Complaint     Chief Complaint   Patient presents with    Follow-up     Patient is here for her hypertension    GERD       History of Present Illness   Kyara Ludwig is a 80y o -year-old female who is here for chronic medical conditions  Labs reviewed  To call and get colonsocopy sheduled  Everything is stable  gerd is stable on meds  bp stable on meds  No side affects      Review of Systems   Review of Systems   Constitutional: Negative for fatigue and fever  HENT: Negative for congestion and postnasal drip  Eyes: Negative for photophobia and visual disturbance  Respiratory: Negative for cough, shortness of breath and wheezing      Cardiovascular: Negative for chest pain and palpitations  Gastrointestinal: Negative for constipation, diarrhea, nausea and vomiting  Genitourinary: Negative for dysuria and frequency  Musculoskeletal: Negative for arthralgias and myalgias  Skin: Negative for rash  Neurological: Negative for dizziness, light-headedness and headaches  Hematological: Negative for adenopathy  Psychiatric/Behavioral: Negative for dysphoric mood and sleep disturbance  The patient is not nervous/anxious  Active Problem List     Patient Active Problem List   Diagnosis    Acid reflux disease    Essential hypertension    Hyperlipidemia    Spondylosis of cervical region without myelopathy or radiculopathy    Thyroid nodule    Vitamin D deficiency         Past Medical History:  Past Medical History:   Diagnosis Date    GERD (gastroesophageal reflux disease)     Hyperlipidemia     Hypertension     Spinal stenosis     Trapezius muscle spasm     last assessed - [id number]       Past Surgical History:  Past Surgical History:   Procedure Laterality Date    BREAST CYST ASPIRATION Right 2010    CATARACT EXTRACTION, BILATERAL      DEBRIDEMENT TENNIS ELBOW      ELBOW SURGERY Right     tennis elbow    FASCIOTOMY ELBOW Right     of the lateral epicondyle; last assessed - [id number]    HYSTERECTOMY      WIN-BSO    OOPHORECTOMY Bilateral     IL COLONOSCOPY FLX DX W/COLLJ SPEC WHEN PFRMD N/A 05/01/2017    Procedure: EGD AND COLONOSCOPY;  Surgeon: Isabel Kim MD;  Location: [facility];   Service: Gastroenterology       Family History:  Family History   Problem Relation Age of Onset    Cancer Sister     Colon cancer Sister 71    Diabetes Sister     Melanoma Mother     Cirrhosis Father         alcoholic    Stroke Father         CVA (cerebral vascular accident)    Arrhythmia Father         pacemaker placement    No Known Problems Maternal Grandmother     No Known Problems Maternal Grandfather     No Known Problems Paternal Grandmother     No Known Problems Paternal Grandfather     No Known Problems Sister     No Known Problems Sister     No Known Problems Sister     No Known Problems Sister     No Known Problems Maternal Aunt     No Known Problems Maternal Aunt     No Known Problems Maternal Aunt     No Known Problems Maternal Aunt     No Known Problems Maternal Aunt     No Known Problems Maternal Aunt     No Known Problems Paternal Aunt     No Known Problems Paternal Aunt     No Known Problems Paternal Aunt        Social History:  Social History     Socioeconomic History    Marital status: /Civil Union     Spouse name: Not on file    Number of children: 2    Years of education: Not on file    Highest education level: Not on file   Occupational History    Occupation: Retired -  - [employer]   Tobacco Use    Smoking status: Light Tobacco Smoker     Packs/day: 0 50     Start date: 1962    Smokeless tobacco: Former User    Tobacco comment: Quit June 2014 - 1/2 ppd for 40 years per [employer]   Vaping Use    Vaping Use: Never used   Substance and Sexual Activity    Alcohol use: No     Comment: Consumes alcohol occasionally per [employer]    Drug use: No    Sexual activity: Not Currently   Other Topics Concern    Not on file   Social History Narrative    Two children - [address] [place] [id number]     Social Determinants of Health     Financial Resource Strain: Not on file   Food Insecurity: Not on file   Transportation Needs: Not on file   Physical Activity: Not on file   Stress: Not on file   Social Connections: Not on file   Intimate Partner Violence: Not on file   Housing Stability: Not on file       Objective     Vitals:    03/08/22 1027   BP: 132/80   Pulse: 68   Resp: 16   Temp: 98 4 °F (36 9 °C)   SpO2: 100%     Wt Readings from Last 3 Encounters:   03/08/22 57 1 kg (125 lb 12 8 oz)   01/04/22 56 9 kg (125 lb 6 4 oz)   08/02/21 56 5 kg (124 lb 9 6 oz)       Physical Exam  Vitals and nursing note reviewed  Constitutional:       Appearance: Normal appearance  HENT:      Head: Normocephalic and atraumatic  Right Ear: Tympanic membrane, ear canal and external ear normal       Left Ear: Tympanic membrane, ear canal and external ear normal       Nose: Nose normal       Mouth/Throat:      Mouth: Mucous membranes are moist    Eyes:      Extraocular Movements: Extraocular movements intact  Conjunctiva/sclera: Conjunctivae normal    Cardiovascular:      Rate and Rhythm: Normal rate and regular rhythm  Heart sounds: Normal heart sounds  Pulmonary:      Effort: Pulmonary effort is normal       Breath sounds: Normal breath sounds  Abdominal:      General: Bowel sounds are normal       Palpations: Abdomen is soft  Musculoskeletal:         General: Normal range of motion  Cervical back: Normal range of motion and neck supple  Skin:     General: Skin is warm and dry  Capillary Refill: Capillary refill takes less than 2 seconds  Neurological:      General: No focal deficit present  Mental Status: She is alert and oriented to person, place, and time  Psychiatric:         Mood and Affect: Mood normal          Behavior: Behavior normal          Thought Content:  Thought content normal          Judgment: Judgment normal          Pertinent Laboratory/Diagnostic Studies:  Lab Results   Component Value Date    GLUCOSE 178 (H) 09/16/2016    BUN 13 03/03/2022    CREATININE 0 83 03/03/2022    CALCIUM 9 8 03/03/2022     10/11/2016    K 4 4 03/03/2022    CO2 30 03/03/2022     03/03/2022     Lab Results   Component Value Date    ALT 11 03/03/2022    AST 18 03/03/2022    ALKPHOS 103 03/03/2022    BILITOT 0 4 10/11/2016       Lab Results   Component Value Date    WBC 4 2 03/03/2022    HGB 15 1 03/03/2022    HCT 43 4 03/03/2022    MCV 94 1 03/03/2022     03/03/2022       No results found for: TSH    Lab Results   Component Value Date    CHOL 251 (H) 10/11/2016     Lab Results Component Value Date    TRIG 86 03/03/2022     Lab Results   Component Value Date    HDL 90 03/03/2022     Lab Results   Component Value Date    LDLCALC 131 (H) 03/03/2022     No results found for: HGBA1C    Results for orders placed or performed in visit on 03/03/22   Lipid Panel with Direct LDL reflex   Result Value Ref Range    Total Cholesterol 240 (H) <200 mg/dL    HDL 90 > OR = 50 mg/dL    Triglycerides 86 <150 mg/dL    LDL Calculated 131 (H) mg/dL (calc)    Chol HDLC Ratio 2 7 <5 0 (calc)    Non-HDL Cholesterol 150 (H) <130 mg/dL (calc)   Comprehensive metabolic panel   Result Value Ref Range    Glucose, Random 89 65 - 99 mg/dL    BUN 13 7 - 25 mg/dL    Creatinine 0 83 0 60 - 0 93 mg/dL    eGFR Non  67 > OR = 60 mL/min/1 73m2    eGFR  78 > OR = 60 mL/min/1 73m2    SL AMB BUN/CREATININE RATIO NOT APPLICABLE 6 - 22 (calc)    Sodium 139 135 - 146 mmol/L    Potassium 4 4 3 5 - 5 3 mmol/L    Chloride 103 98 - 110 mmol/L    CO2 30 20 - 32 mmol/L    Calcium 9 8 8 6 - 10 4 mg/dL    Protein, Total 7 1 6 1 - 8 1 g/dL    Albumin 4 5 3 6 - 5 1 g/dL    Globulin 2 6 1 9 - 3 7 g/dL (calc)    Albumin/Globulin Ratio 1 7 1 0 - 2 5 (calc)    TOTAL BILIRUBIN 0 5 0 2 - 1 2 mg/dL    Alkaline Phosphatase 103 37 - 153 U/L    AST 18 10 - 35 U/L    ALT 11 6 - 29 U/L   CBC and differential   Result Value Ref Range    White Blood Cell Count 4 2 3 8 - 10 8 Thousand/uL    Red Blood Cell Count 4 61 3 80 - 5 10 Million/uL    Hemoglobin 15 1 11 7 - 15 5 g/dL    HCT 43 4 35 0 - 45 0 %    MCV 94 1 80 0 - 100 0 fL    MCH 32 8 27 0 - 33 0 pg    MCHC 34 8 32 0 - 36 0 g/dL    RDW 12 0 11 0 - 15 0 %    Platelet Count 190 522 - 400 Thousand/uL    SL AMB MPV 10 6 7 5 - 12 5 fL    Neutrophils (Absolute) 2,885 1,500 - 7,800 cells/uL    Lymphocytes (Absolute) 962 850 - 3,900 cells/uL    Monocytes (Absolute) 281 200 - 950 cells/uL    Eosinophils (Absolute) 42 15 - 500 cells/uL    Basophils ABS 29 0 - 200 cells/uL Neutrophils 68 7 %    Lymphocytes 22 9 %    Monocytes 6 7 %    Eosinophils 1 0 %    Basophils PCT 0 7 %   TSH, 3rd generation with Free T4 reflex   Result Value Ref Range    TSH W/RFX TO FREE T4 2 03 0 40 - 4 50 mIU/L       Orders Placed This Encounter   Procedures    Mammo screening bilateral w cad    Comprehensive metabolic panel    CBC and differential    Lipid Panel with Direct LDL reflex       ALLERGIES:  Allergies   Allergen Reactions    Atorvastatin Edema    Other      Cholesterol med- name unknown and pneumonia vaccine    Pneumococcal Vaccines Edema       Current Medications     Current Outpatient Medications   Medication Sig Dispense Refill    Cholecalciferol (VITAMIN D) 2000 UNITS CAPS Take by mouth   losartan (COZAAR) 100 MG tablet TAKE 1 TABLET BY MOUTH EVERY DAY 90 tablet 3    omeprazole (PriLOSEC) 40 MG capsule TAKE 1 CAPSULE EVERY DAY 90 capsule 3     No current facility-administered medications for this visit           Health Maintenance     Health Maintenance   Topic Date Due    SLP PLAN OF CARE  Never done    Osteoporosis Screening  Never done    Colorectal Cancer Screening  05/01/2020    Breast Cancer Screening: Mammogram  10/17/2021    DTaP,Tdap,and Td Vaccines (1 - Tdap) 08/02/2022 (Originally 12/2/1961)    Medicare Annual Wellness Visit (AWV)  08/02/2022    Fall Risk  01/04/2023    Depression Screening  03/08/2023    BMI: Adult  03/08/2023    Hepatitis C Screening  Completed    Pneumococcal Vaccine: 65+ Years  Completed    Influenza Vaccine  Completed    COVID-19 Vaccine  Completed    HIB Vaccine  Aged Out    Hepatitis B Vaccine  Aged Out    IPV Vaccine  Aged Out    Hepatitis A Vaccine  Aged Out    Meningococcal ACWY Vaccine  Aged Out    HPV Vaccine  Aged Out     Immunization History   Administered Date(s) Administered    COVID-19 PFIZER VACCINE 0 3 ML IM 01/21/2021, 02/09/2021, 10/23/2021    INFLUENZA 10/25/2016, 10/24/2017, 11/01/2018, 11/24/2018, 10/24/2019, 11/09/2021    Influenza Split High Dose Preservative Free IM 10/10/2014, 10/13/2015, 10/25/2016, 11/01/2018, 11/07/2019    Influenza, high dose seasonal 0 7 mL 10/24/2019    Influenza, recombinant, quadrivalent,injectable, preservative free 11/03/2020    Influenza, seasonal, injectable 10/08/2013    Influenza, seasonal, injectable, preservative free 11/24/2018    Pneumococcal Polysaccharide PPV23 10/12/2010    Zoster 05/31/2017       Depression Screening and Follow-up Plan: Patient was screened for depression during today's encounter  They screened negative with a PHQ-2 score of 0  Tobacco Cessation Counseling: Tobacco cessation counseling was not provided   The patient is sincerely urged to quit consumption of tobacco  She is not ready to quit tobacco        IRAM Schrader

## 2022-05-24 ENCOUNTER — FOLLOW UP (OUTPATIENT)
Dept: URBAN - METROPOLITAN AREA CLINIC 6 | Facility: CLINIC | Age: 82
End: 2022-05-24

## 2022-05-24 DIAGNOSIS — Z96.1: ICD-10-CM

## 2022-05-24 DIAGNOSIS — H04.123: ICD-10-CM

## 2022-05-24 PROCEDURE — 92012 INTRM OPH EXAM EST PATIENT: CPT

## 2022-05-24 PROCEDURE — 92134 CPTRZ OPH DX IMG PST SGM RTA: CPT | Mod: NC

## 2022-05-24 ASSESSMENT — TONOMETRY
OD_IOP_MMHG: 13
OS_IOP_MMHG: 11

## 2022-05-24 ASSESSMENT — KERATOMETRY
OD_K2POWER_DIOPTERS: 43.25
OS_K2POWER_DIOPTERS: 43.00
OD_K1POWER_DIOPTERS: 42.50
OS_K1POWER_DIOPTERS: 42.00
OD_AXISANGLE_DEGREES: 113
OD_AXISANGLE2_DEGREES: 23
OS_AXISANGLE_DEGREES: 086
OS_AXISANGLE2_DEGREES: 176

## 2022-05-24 ASSESSMENT — VISUAL ACUITY
OS_SC: 20/50
OS_PH: 20/40
OD_SC: 20/30

## 2022-07-22 ENCOUNTER — FOLLOW UP (OUTPATIENT)
Dept: URBAN - METROPOLITAN AREA CLINIC 6 | Facility: CLINIC | Age: 82
End: 2022-07-22

## 2022-07-22 DIAGNOSIS — H35.372: ICD-10-CM

## 2022-07-22 DIAGNOSIS — H04.123: ICD-10-CM

## 2022-07-22 DIAGNOSIS — Z96.1: ICD-10-CM

## 2022-07-22 DIAGNOSIS — H35.363: ICD-10-CM

## 2022-07-22 PROCEDURE — 92134 CPTRZ OPH DX IMG PST SGM RTA: CPT

## 2022-07-22 PROCEDURE — 92012 INTRM OPH EXAM EST PATIENT: CPT

## 2022-07-22 ASSESSMENT — TONOMETRY
OS_IOP_MMHG: 13
OD_IOP_MMHG: 13

## 2022-07-22 ASSESSMENT — VISUAL ACUITY
OU_CC: J1
OD_CC: 20/30-1
OS_CC: 20/30-1

## 2022-07-26 ENCOUNTER — HOSPITAL ENCOUNTER (EMERGENCY)
Facility: HOSPITAL | Age: 82
Discharge: HOME | End: 2022-07-26
Attending: EMERGENCY MEDICINE
Payer: COMMERCIAL

## 2022-07-26 VITALS
SYSTOLIC BLOOD PRESSURE: 186 MMHG | OXYGEN SATURATION: 97 % | HEIGHT: 61 IN | HEART RATE: 68 BPM | DIASTOLIC BLOOD PRESSURE: 84 MMHG | TEMPERATURE: 97.5 F | BODY MASS INDEX: 23.6 KG/M2 | RESPIRATION RATE: 16 BRPM | WEIGHT: 125 LBS

## 2022-07-26 DIAGNOSIS — I10 HYPERTENSION, UNSPECIFIED TYPE: Primary | ICD-10-CM

## 2022-07-26 DIAGNOSIS — I16.0 HYPERTENSIVE URGENCY: ICD-10-CM

## 2022-07-26 LAB
ALBUMIN SERPL BCP-MCNC: 4.1 G/DL (ref 3.5–5)
ALP SERPL-CCNC: 77 U/L (ref 34–104)
ALT SERPL W P-5'-P-CCNC: 10 U/L (ref 7–52)
ANION GAP SERPL CALCULATED.3IONS-SCNC: 6 MMOL/L (ref 4–13)
AST SERPL W P-5'-P-CCNC: 15 U/L (ref 13–39)
ATRIAL RATE: 70 BPM
BASOPHILS # BLD AUTO: 0.02 THOUSANDS/ΜL (ref 0–0.1)
BASOPHILS NFR BLD AUTO: 0 % (ref 0–1)
BILIRUB SERPL-MCNC: 0.44 MG/DL (ref 0.2–1)
BUN SERPL-MCNC: 12 MG/DL (ref 5–25)
CALCIUM SERPL-MCNC: 9.7 MG/DL (ref 8.4–10.2)
CHLORIDE SERPL-SCNC: 106 MMOL/L (ref 96–108)
CO2 SERPL-SCNC: 27 MMOL/L (ref 21–32)
CREAT SERPL-MCNC: 0.69 MG/DL (ref 0.6–1.3)
EOSINOPHIL # BLD AUTO: 0.01 THOUSAND/ΜL (ref 0–0.61)
EOSINOPHIL NFR BLD AUTO: 0 % (ref 0–6)
ERYTHROCYTE [DISTWIDTH] IN BLOOD BY AUTOMATED COUNT: 13.2 % (ref 11.6–15.1)
GFR SERPL CREATININE-BSD FRML MDRD: 83 ML/MIN/1.73SQ M
GLUCOSE SERPL-MCNC: 87 MG/DL (ref 65–140)
HCT VFR BLD AUTO: 42.9 % (ref 34.8–46.1)
HGB BLD-MCNC: 14.1 G/DL (ref 11.5–15.4)
IMM GRANULOCYTES # BLD AUTO: 0.04 THOUSAND/UL (ref 0–0.2)
IMM GRANULOCYTES NFR BLD AUTO: 1 % (ref 0–2)
LYMPHOCYTES # BLD AUTO: 0.85 THOUSANDS/ΜL (ref 0.6–4.47)
LYMPHOCYTES NFR BLD AUTO: 11 % (ref 14–44)
MCH RBC QN AUTO: 32 PG (ref 26.8–34.3)
MCHC RBC AUTO-ENTMCNC: 32.9 G/DL (ref 31.4–37.4)
MCV RBC AUTO: 98 FL (ref 82–98)
MONOCYTES # BLD AUTO: 0.36 THOUSAND/ΜL (ref 0.17–1.22)
MONOCYTES NFR BLD AUTO: 5 % (ref 4–12)
NEUTROPHILS # BLD AUTO: 6.51 THOUSANDS/ΜL (ref 1.85–7.62)
NEUTS SEG NFR BLD AUTO: 83 % (ref 43–75)
NRBC BLD AUTO-RTO: 0 /100 WBCS
P AXIS: 71 DEGREES
PLATELET # BLD AUTO: 247 THOUSANDS/UL (ref 149–390)
PMV BLD AUTO: 10.1 FL (ref 8.9–12.7)
POTASSIUM SERPL-SCNC: 3.8 MMOL/L (ref 3.5–5.3)
PR INTERVAL: 112 MS
PROT SERPL-MCNC: 6.6 G/DL (ref 6.4–8.4)
QRS AXIS: 56 DEGREES
QRSD INTERVAL: 90 MS
QT INTERVAL: 372 MS
QTC INTERVAL: 401 MS
RBC # BLD AUTO: 4.4 MILLION/UL (ref 3.81–5.12)
SODIUM SERPL-SCNC: 139 MMOL/L (ref 135–147)
T WAVE AXIS: 45 DEGREES
VENTRICULAR RATE: 70 BPM
WBC # BLD AUTO: 7.79 THOUSAND/UL (ref 4.31–10.16)

## 2022-07-26 PROCEDURE — 93010 ELECTROCARDIOGRAM REPORT: CPT | Performed by: INTERNAL MEDICINE

## 2022-07-26 PROCEDURE — 80053 COMPREHEN METABOLIC PANEL: CPT

## 2022-07-26 PROCEDURE — 99285 EMERGENCY DEPT VISIT HI MDM: CPT | Performed by: EMERGENCY MEDICINE

## 2022-07-26 PROCEDURE — 99283 EMERGENCY DEPT VISIT LOW MDM: CPT

## 2022-07-26 PROCEDURE — 93005 ELECTROCARDIOGRAM TRACING: CPT

## 2022-07-26 PROCEDURE — 36415 COLL VENOUS BLD VENIPUNCTURE: CPT

## 2022-07-26 PROCEDURE — 85025 COMPLETE CBC W/AUTO DIFF WBC: CPT

## 2022-07-26 NOTE — ED PROVIDER NOTES
History  Chief Complaint   Patient presents with    Hypertension     Pt reports BP running 170/80s this am, b/l neck pain since yesterday, b/l arm numbness/tingling intermittent since yesterday around 10am, denies any numbness/tingling at this time  Denies HA/CP/dizziness  71yo F w/ h/o cervical stenosis, and HTN (well-controlled) presenting for HTN x1 episode  Pt has been having exacerbated neck pain, more than usual  Pt became worried so she checked her BP today and found it to be elevated in the 180's/80's and decided to present to the ED  Pt's only sx associated is one episode of b/l arm tingling  Denies abd  Pain, CP, SOB, changes in voiding/stooling, dizziness, slurred speech, changes in mentation, confusion, visual changes, HA  Pt's  denies any changes in Pt's gait/mentation/speech  History provided by:  Patient and spouse      Prior to Admission Medications   Prescriptions Last Dose Informant Patient Reported? Taking?    Cholecalciferol (VITAMIN D) 2000 UNITS CAPS  Self Yes No   Sig: Take by mouth    losartan (COZAAR) 100 MG tablet   No No   Sig: TAKE 1 TABLET BY MOUTH EVERY DAY   omeprazole (PriLOSEC) 40 MG capsule  Self No No   Sig: TAKE 1 CAPSULE EVERY DAY      Facility-Administered Medications: None       Past Medical History:   Diagnosis Date    GERD (gastroesophageal reflux disease)     Hyperlipidemia     Hypertension     Spinal stenosis     Trapezius muscle spasm     last assessed - [id number]       Past Surgical History:   Procedure Laterality Date    BREAST CYST ASPIRATION Right 2010    CATARACT EXTRACTION, BILATERAL      DEBRIDEMENT TENNIS ELBOW      ELBOW SURGERY Right     tennis elbow    FASCIOTOMY ELBOW Right     of the lateral epicondyle; last assessed - 10Oct2014    HYSTERECTOMY      WIN-BSO    OOPHORECTOMY Bilateral     W[place] COLONOSCOPY FLX DX W/COLLJ SPEC WHEN PFRMD N/A 05/01/2017    Procedure: EGD AND COLONOSCOPY;  Surgeon: Yanique Guthrie MD;  [facility];  Service: Gastroenterology       Family History   Problem Relation Age of Onset    Cancer Sister     Colon cancer Sister 71    Diabetes Sister     Melanoma Mother     Cirrhosis Father         alcoholic    Stroke Father         CVA (cerebral vascular accident)    Arrhythmia Father         pacemaker placement    No Known Problems Maternal Grandmother     No Known Problems Maternal Grandfather     No Known Problems Paternal Grandmother     No Known Problems Paternal Grandfather     No Known Problems Sister     No Known Problems Sister     No Known Problems Sister     No Known Problems Sister     No Known Problems Maternal Aunt     No Known Problems Maternal Aunt     No Known Problems Maternal Aunt     No Known Problems Maternal Aunt     No Known Problems Maternal Aunt     No Known Problems Maternal Aunt     No Known Problems Paternal Aunt     No Known Problems Paternal Aunt     No Known Problems Paternal Aunt      I have reviewed and agree with the history as documented  E-Cigarette/Vaping    E-Cigarette Use Never User      E-Cigarette/Vaping Substances    Nicotine No     THC No     CBD No     Flavoring No     Other No     Unknown No      Social History     Tobacco Use    Smoking status: Light Tobacco Smoker     Packs/day: 0 50     Start date: 1962    Smokeless tobacco: Former User    Tobacco comment: Quit June 2014 - 1/2 ppd for 40 years per [employer]   Vaping Use    Vaping Use: Never used   Substance Use Topics    Alcohol use: No     Comment: Consumes alcohol occasionally per [employer]    Drug use: No        Review of Systems   Constitutional: Negative for activity change, appetite change, chills, diaphoresis, fatigue and fever  HENT: Negative  Eyes: Negative  Respiratory: Negative  Cardiovascular: Negative  Gastrointestinal: Negative  Genitourinary: Negative  Musculoskeletal: Positive for neck pain and neck stiffness   Negative for arthralgias, back pain and gait problem  Skin: Negative  Neurological: Positive for numbness (b/l arm)  Psychiatric/Behavioral: Negative  Physical Exam  ED Triage Vitals [07/26/22 1128]   Temperature Pulse Respirations Blood Pressure SpO2   97 5 °F (36 4 °C) 70 16 (!) 184/80 97 %      Temp Source Heart Rate Source Patient Position - Orthostatic VS BP Location FiO2 (%)   Oral Monitor Sitting Left arm --      Pain Score       7             Orthostatic Vital Signs  Vitals:    07/26/22 1128 07/26/22 1200 07/26/22 1343 07/26/22 1403   BP: (!) 184/80 (!) 197/91 (!) 191/81 (!) 186/84   Pulse: 70 76 62 68   Patient Position - Orthostatic VS: Sitting Sitting Sitting Sitting       Physical Exam  Constitutional:       General: She is not in acute distress  Appearance: Normal appearance  HENT:      Head: Normocephalic and atraumatic  Right Ear: External ear normal       Left Ear: External ear normal       Nose: Nose normal  No rhinorrhea  Eyes:      General: No scleral icterus  Conjunctiva/sclera: Conjunctivae normal    Neck:      Comments: Reduced ROM on extension/flexion/rotation  Cardiovascular:      Rate and Rhythm: Normal rate and regular rhythm  Pulses: Normal pulses  Heart sounds: Normal heart sounds  Pulmonary:      Effort: Pulmonary effort is normal  No respiratory distress  Breath sounds: Normal breath sounds  Abdominal:      General: Abdomen is flat  Bowel sounds are normal  There is no distension  Palpations: Abdomen is soft  Tenderness: There is no abdominal tenderness  There is no right CVA tenderness or left CVA tenderness  Musculoskeletal:         General: No tenderness  Cervical back: No tenderness  Right lower leg: No edema  Left lower leg: No edema  Skin:     General: Skin is warm and dry  Capillary Refill: Capillary refill takes less than 2 seconds  Neurological:      General: No focal deficit present        Mental Status: She is alert and oriented to person, place, and time  Cranial Nerves: No cranial nerve deficit  Sensory: No sensory deficit  Motor: No weakness        Coordination: Coordination normal    Psychiatric:         Mood and Affect: Mood normal          Behavior: Behavior normal          ED Medications  Medications - No data to display    Diagnostic Studies  Results Reviewed     Procedure Component Value Units Date/Time    Comprehensive metabolic panel [[id number]] Collected: 07/26/22 1246    Lab Status: Final result Specimen: Blood from Arm, Right Updated: 07/26/22 1318     Sodium 139 mmol/L      Potassium 3 8 mmol/L      Chloride 106 mmol/L      CO2 27 mmol/L      ANION GAP 6 mmol/L      BUN 12 mg/dL      Creatinine 0 69 mg/dL      Glucose 87 mg/dL      Calcium 9 7 mg/dL      AST 15 U/L      ALT 10 U/L      Alkaline Phosphatase 77 U/L      Total Protein 6 6 g/dL      Albumin 4 1 g/dL      Total Bilirubin 0 44 mg/dL      eGFR 83 ml/min/1 73sq m     Narrative:      [place] guidelines for Chronic Kidney Disease (CKD):     Stage 1 with normal or high GFR (GFR > 90 mL/min/1 73 square meters)    Stage 2 Mild CKD (GFR = 60-89 mL/min/1 73 square meters)    Stage 3A Moderate CKD (GFR = 45-59 mL/min/1 73 square meters)    Stage 3B Moderate CKD (GFR = 30-44 mL/min/1 73 square meters)    Stage 4 Severe CKD (GFR = 15-29 mL/min/1 73 square meters)    Stage 5 End Stage CKD (GFR <15 mL/min/1 73 square meters)  Note: GFR calculation is accurate only with a steady state creatinine    CBC and differential [[id number]]  (Abnormal) Collected: 07/26/22 1246    Lab Status: Final result Specimen: Blood from Arm, Right Updated: 07/26/22 1309     WBC 7 79 Thousand/uL      RBC 4 40 Million/uL      Hemoglobin 14 1 g/dL      Hematocrit 42 9 %      MCV 98 fL      MCH 32 0 pg      MCHC 32 9 g/dL      RDW 13 2 %      MPV 10 1 fL      Platelets 808 Thousands/uL      nRBC 0 /100 WBCs      Neutrophils Relative 83 %      Immat GRANS % 1 %      Lymphocytes Relative 11 %      Monocytes Relative 5 %      Eosinophils Relative 0 %      Basophils Relative 0 %      Neutrophils Absolute 6 51 Thousands/µL      Immature Grans Absolute 0 04 Thousand/uL      Lymphocytes Absolute 0 85 Thousands/µL      Monocytes Absolute 0 36 Thousand/µL      Eosinophils Absolute 0 01 Thousand/µL      Basophils Absolute 0 02 Thousands/µL                  No orders to display         Procedures  ECG 12 Lead Documentation Only    Date/Time: 7/26/2022 2:05 PM  Performed by: Glory Luna MD  Authorized by: Glory Luna MD     ECG reviewed by me, the ED Provider: yes    Patient location:  ED  Interpretation:     Interpretation: normal    Rate:     ECG rate:  70    ECG rate assessment: normal    Rhythm:     Rhythm: sinus rhythm    Ectopy:     Ectopy: none    QRS:     QRS axis:  Normal  Conduction:     Conduction: normal    ST segments:     ST segments:  Normal  T waves:     T waves: normal            ED Course                                       MDM  Number of Diagnoses or Management Options  Hypertension, unspecified type  Hypertensive urgency  Diagnosis management comments: Physical exam and labs show no signs of end organ damage, and this was a single isolated episode of HTN all c/w HTN urgency  Pt's BP remained stable in the department and she had no new onset sx  Advised Pt to f/u with PCP, and monitor BP  Provided return precautions         Disposition  Final diagnoses:   Hypertension, unspecified type   Hypertensive urgency     Time reflects when diagnosis was documented in both MDM as applicable and the Disposition within this note     Time User Action Codes Description Comment    7/26/2022  1:58 PM Bradd Bolds Add [I10] Hypertension, unspecified type     7/26/2022  2:00 PM Bradd Bolds Add [I16 0] Hypertensive urgency       ED Disposition     ED Disposition   Discharge    Condition   Stable    Date/Time   Tue Jul 26, 2022  1:58 PM    Comment   [facility] discharge to home/self care  Follow-up Information     Follow up With Specialties Details Why Contact Info Additional Information    Kait Méndez, [address], Internal Medicine, Nurse Practitioner Schedule an appointment as soon as possible for a visit  As needed [address] [place] [address] [phone]       [facility] Department Emergency Medicine Go to  If symptoms worsen [address] Emergency Department, [address], [place], [place], [address]          Discharge Medication List as of 7/26/2022  2:00 PM      CONTINUE these medications which have NOT CHANGED    Details   Cholecalciferol (VITAMIN D) 2000 UNITS CAPS Take by mouth , Historical Med      losartan (COZAAR) 100 MG tablet TAKE 1 TABLET BY MOUTH EVERY DAY, Normal      omeprazole (PriLOSEC) 40 MG capsule TAKE 1 CAPSULE EVERY DAY, Normal           No discharge procedures on file  PDMP Review     None           ED Provider  Attending physically available and evaluated Zhen Maki I managed the patient along with the ED Attending      Electronically Signed by         Charlene Mackenzie MD  07/26/22 0193

## 2022-07-26 NOTE — DISCHARGE INSTRUCTIONS
Please return to the ER if you experience any of the following:    Changes in urination (blood, decreased, etc ), dizziness, confusion, slurred speech, weakness, vision changes, chest pain, shortness of breath

## 2022-07-26 NOTE — ED ATTENDING ATTESTATION
7/26/2022  I, Ismael Ashby MD, saw and evaluated the patient  I have discussed the patient with the resident/non-physician practitioner and agree with the resident's/non-physician practitioner's findings, Plan of Care, and MDM as documented in the resident's/non-physician practitioner's note, except where noted  All available labs and Radiology studies were reviewed  I was present for key portions of any procedure(s) performed by the resident/non-physician practitioner and I was immediately available to provide assistance  At this point I agree with the current assessment done in the Emergency Department  I have conducted an independent evaluation of this patient a history and physical is as follows:    S:  Chief Complaint   Patient presents with    Hypertension     Pt reports BP running 170/80s this am, b/l neck pain since yesterday, b/l arm numbness/tingling intermittent since yesterday around 10am, denies any numbness/tingling at this time  Denies MUHAMMAD/CP/dizziness  Alta Hancock is a 80 y o  female who reports she has had some neck pain (consistent with her known symptoms from cervical stenosis)  Today she took her blood pressure and found it to be elevated at home  No chest pain, shortness of breath, headache, dizziness, nausea, vomiting  She has a longstanding history of hypertension and takes Losartan for the same without changes  BP Readings from Last 3 Encounters:   07/26/22 (!) 186/84   03/08/22 132/80   01/04/22 138/70   No headaches, visual changes  She does reports a sensation change to her left eyebrow but this followed cataract surgery on the same eye       O:  ED Triage Vitals [07/26/22 1128]   Temperature Pulse Respirations Blood Pressure SpO2   97 5 °F (36 4 °C) 70 16 (!) 184/80 97 %      Temp Source Heart Rate Source Patient Position - Orthostatic VS BP Location FiO2 (%)   Oral Monitor Sitting Left arm --      Pain Score       7         Physical Exam  Vitals and nursing note reviewed  Constitutional:       General: She is not in acute distress  Appearance: She is well-developed  HENT:      Head: Normocephalic and atraumatic  Eyes:      Extraocular Movements: Extraocular movements intact  Pupils: Pupils are equal, round, and reactive to light  Neck:      Vascular: No JVD  Cardiovascular:      Rate and Rhythm: Normal rate and regular rhythm  Heart sounds: Normal heart sounds  No murmur heard  No friction rub  No gallop  Pulmonary:      Effort: Pulmonary effort is normal  No respiratory distress  Breath sounds: Normal breath sounds  No wheezing or rales  Chest:      Chest wall: No tenderness  Musculoskeletal:         General: No tenderness  Normal range of motion  Cervical back: Normal range of motion  Skin:     General: Skin is warm and dry  Neurological:      General: No focal deficit present  Mental Status: She is alert and oriented to person, place, and time  GCS: GCS eye subscore is 4  GCS verbal subscore is 5  GCS motor subscore is 6  Psychiatric:         Behavior: Behavior normal          Thought Content: Thought content normal          Judgment: Judgment normal        80 y o  female presents for evaluation of elevated blood pressure  Patient is not experiencing any significant symptoms with his hypertension  Will check endpoints including ekg, may start antihypertensive here or refer to PCP for outpatient management         ED Course     Labs Reviewed   CBC AND DIFFERENTIAL - Abnormal       Result Value Ref Range Status    WBC 7 79  4 31 - 10 16 Thousand/uL Final    RBC 4 40  3 81 - 5 12 Million/uL Final    Hemoglobin 14 1  11 5 - 15 4 g/dL Final    Hematocrit 42 9  34 8 - 46 1 % Final    MCV 98  82 - 98 fL Final    MCH 32 0  26 8 - 34 3 pg Final    MCHC 32 9  31 4 - 37 4 g/dL Final    RDW 13 2  11 6 - 15 1 % Final    MPV 10 1  8 9 - 12 7 fL Final    Platelets 572  024 - 390 Thousands/uL Final    nRBC 0  /100 WBCs Final Neutrophils Relative 83 (*) 43 - 75 % Final    Immat GRANS % 1  0 - 2 % Final    Lymphocytes Relative 11 (*) 14 - 44 % Final    Monocytes Relative 5  4 - 12 % Final    Eosinophils Relative 0  0 - 6 % Final    Basophils Relative 0  0 - 1 % Final    Neutrophils Absolute 6 51  1 85 - 7 62 Thousands/µL Final    Immature Grans Absolute 0 04  0 00 - 0 20 Thousand/uL Final    Lymphocytes Absolute 0 85  0 60 - 4 47 Thousands/µL Final    Monocytes Absolute 0 36  0 17 - 1 22 Thousand/µL Final    Eosinophils Absolute 0 01  0 00 - 0 61 Thousand/µL Final    Basophils Absolute 0 02  0 00 - 0 10 Thousands/µL Final   COMPREHENSIVE METABOLIC PANEL    Sodium 984  135 - 147 mmol/L Final    Potassium 3 8  3 5 - 5 3 mmol/L Final    Chloride 106  96 - 108 mmol/L Final    CO2 27  21 - 32 mmol/L Final    ANION GAP 6  4 - 13 mmol/L Final    BUN 12  5 - 25 mg/dL Final    Creatinine 0 69  0 60 - 1 30 mg/dL Final    Comment: Standardized to IDMS reference method    Glucose 87  65 - 140 mg/dL Final    Comment: If the patient is fasting, the ADA then defines impaired fasting glucose as > 100 mg/dL and diabetes as > or equal to 123 mg/dL  Specimen collection should occur prior to Sulfasalazine administration due to the potential for falsely depressed results  Specimen collection should occur prior to Sulfapyridine administration due to the potential for falsely elevated results  Calcium 9 7  8 4 - 10 2 mg/dL Final    AST 15  13 - 39 U/L Final    Comment: Specimen collection should occur prior to Sulfasalazine administration due to the potential for falsely depressed results  ALT 10  7 - 52 U/L Final    Comment: Specimen collection should occur prior to Sulfasalazine administration due to the potential for falsely depressed results       Alkaline Phosphatase 77  34 - 104 U/L Final    Total Protein 6 6  6 4 - 8 4 g/dL Final    Albumin 4 1  3 5 - 5 0 g/dL Final    Total Bilirubin 0 44  0 20 - 1 00 mg/dL Final    eGFR 83  ml/min/1 73sq m Final Narrative:     National Kid[employer] guidelines for Chronic Kidney Disease (CKD):     Stage 1 with normal or high GFR (GFR > 90 mL/min/1 73 square meters)    Stage 2 Mild CKD (GFR = 60-89 mL/min/1 73 square meters)    Stage 3A Moderate CKD (GFR = 45-59 mL/min/1 73 square meters)    Stage 3B Moderate CKD (GFR = 30-44 mL/min/1 73 square meters)    Stage 4 Severe CKD (GFR = 15-29 mL/min/1 73 square meters)    Stage 5 End Stage CKD (GFR <15 mL/min/1 73 square meters)  Note: GFR calculation is accurate only with a steady state creatinine       Critical Care Time  Procedures    Time reflects when diagnosis was documented in both MDM as applicable and the Disposition within this note     Time User Action Codes Description Comment    7/26/2022  1:58 PM Felecia Almanza Add [I10] Hypertension, unspecified type     7/26/2022  2:00 PM Felecia Almanza Add [I16 0] Hypertensive urgency       ED Disposition     ED Disposition   Discharge    Condition   Stable    Date/Time   Tue Jul 26, 2022  1:58 PM    Comment   Diantha Bone discharge to home/self care                 Follow-up Information     Follow up With Specialties Details Why Contact Info Additional Information    Aubree Mcpherson, [address], Internal Medicine, Nurse Practitioner Schedule an appointment as soon as possible for a visit  As needed [address] [place] [address] [phone]       [facility] Emergency Department Emergency Medicine Go to  If symptoms worsen [address] Emergency Department, [address], [place], [address] [facility], [address]

## 2022-07-29 ENCOUNTER — RA CDI HCC (OUTPATIENT)
Dept: OTHER | Facility: HOSPITAL | Age: 82
End: 2022-07-29

## 2022-07-29 NOTE — PROGRESS NOTES
[facility]  coding opportunities       Chart reviewed, no opportunity found:   [address]        Patients Insurance     Medicare Insurance: [facility]

## 2022-08-03 LAB
ALBUMIN SERPL-MCNC: 4.3 G/DL (ref 3.6–5.1)
ALBUMIN/GLOB SERPL: 2 (CALC) (ref 1–2.5)
ALP SERPL-CCNC: 82 U/L (ref 37–153)
ALT SERPL-CCNC: 7 U/L (ref 6–29)
AST SERPL-CCNC: 14 U/L (ref 10–35)
BASOPHILS # BLD AUTO: 29 CELLS/UL (ref 0–200)
BASOPHILS NFR BLD AUTO: 0.5 %
BILIRUB SERPL-MCNC: 0.6 MG/DL (ref 0.2–1.2)
BUN SERPL-MCNC: 20 MG/DL (ref 7–25)
BUN/CREAT SERPL: NORMAL (CALC) (ref 6–22)
CALCIUM SERPL-MCNC: 9.8 MG/DL (ref 8.6–10.4)
CHLORIDE SERPL-SCNC: 106 MMOL/L (ref 98–110)
CHOLEST SERPL-MCNC: 226 MG/DL
CHOLEST/HDLC SERPL: 2.9 (CALC)
CO2 SERPL-SCNC: 29 MMOL/L (ref 20–32)
CREAT SERPL-MCNC: 0.75 MG/DL (ref 0.6–1)
EOSINOPHIL # BLD AUTO: 63 CELLS/UL (ref 15–500)
EOSINOPHIL NFR BLD AUTO: 1.1 %
ERYTHROCYTE [DISTWIDTH] IN BLOOD BY AUTOMATED COUNT: 12.7 % (ref 11–15)
GFR/BSA.PRED SERPLBLD CYS-BASED-ARV: 81 ML/MIN/1.73M2
GLOBULIN SER CALC-MCNC: 2.2 G/DL (CALC) (ref 1.9–3.7)
GLUCOSE SERPL-MCNC: 91 MG/DL (ref 65–99)
HCT VFR BLD AUTO: 41.8 % (ref 35–45)
HDLC SERPL-MCNC: 78 MG/DL
HGB BLD-MCNC: 14.1 G/DL (ref 11.7–15.5)
LDLC SERPL CALC-MCNC: 128 MG/DL (CALC)
LYMPHOCYTES # BLD AUTO: 1334 CELLS/UL (ref 850–3900)
LYMPHOCYTES NFR BLD AUTO: 23.4 %
MCH RBC QN AUTO: 32.5 PG (ref 27–33)
MCHC RBC AUTO-ENTMCNC: 33.7 G/DL (ref 32–36)
MCV RBC AUTO: 96.3 FL (ref 80–100)
MONOCYTES # BLD AUTO: 405 CELLS/UL (ref 200–950)
MONOCYTES NFR BLD AUTO: 7.1 %
NEUTROPHILS # BLD AUTO: 3870 CELLS/UL (ref 1500–7800)
NEUTROPHILS NFR BLD AUTO: 67.9 %
NONHDLC SERPL-MCNC: 148 MG/DL (CALC)
PLATELET # BLD AUTO: 269 THOUSAND/UL (ref 140–400)
PMV BLD REES-ECKER: 10.9 FL (ref 7.5–12.5)
POTASSIUM SERPL-SCNC: 4.2 MMOL/L (ref 3.5–5.3)
PROT SERPL-MCNC: 6.5 G/DL (ref 6.1–8.1)
RBC # BLD AUTO: 4.34 MILLION/UL (ref 3.8–5.1)
SODIUM SERPL-SCNC: 141 MMOL/L (ref 135–146)
TRIGL SERPL-MCNC: 98 MG/DL
WBC # BLD AUTO: 5.7 THOUSAND/UL (ref 3.8–10.8)

## 2022-08-09 ENCOUNTER — OFFICE VISIT (OUTPATIENT)
Dept: FAMILY MEDICINE CLINIC | Facility: CLINIC | Age: 82
End: 2022-08-09
Payer: COMMERCIAL

## 2022-08-09 VITALS
OXYGEN SATURATION: 97 % | TEMPERATURE: 98.3 F | DIASTOLIC BLOOD PRESSURE: 70 MMHG | SYSTOLIC BLOOD PRESSURE: 130 MMHG | WEIGHT: 122.2 LBS | HEIGHT: 62 IN | HEART RATE: 92 BPM | BODY MASS INDEX: 22.49 KG/M2 | RESPIRATION RATE: 16 BRPM

## 2022-08-09 DIAGNOSIS — E78.2 MIXED HYPERLIPIDEMIA: ICD-10-CM

## 2022-08-09 DIAGNOSIS — E04.1 THYROID NODULE: Primary | ICD-10-CM

## 2022-08-09 DIAGNOSIS — K21.9 GASTROESOPHAGEAL REFLUX DISEASE WITHOUT ESOPHAGITIS: ICD-10-CM

## 2022-08-09 DIAGNOSIS — I10 ESSENTIAL HYPERTENSION: ICD-10-CM

## 2022-08-09 DIAGNOSIS — Z00.00 MEDICARE ANNUAL WELLNESS VISIT, SUBSEQUENT: ICD-10-CM

## 2022-08-09 PROCEDURE — 99214 OFFICE O/P EST MOD 30 MIN: CPT | Performed by: NURSE PRACTITIONER

## 2022-08-09 PROCEDURE — G0439 PPPS, SUBSEQ VISIT: HCPCS | Performed by: NURSE PRACTITIONER

## 2022-08-09 PROCEDURE — 3288F FALL RISK ASSESSMENT DOCD: CPT | Performed by: NURSE PRACTITIONER

## 2022-08-09 PROCEDURE — 1170F FXNL STATUS ASSESSED: CPT | Performed by: NURSE PRACTITIONER

## 2022-08-09 PROCEDURE — 1101F PT FALLS ASSESS-DOCD LE1/YR: CPT | Performed by: NURSE PRACTITIONER

## 2022-08-09 PROCEDURE — 1160F RVW MEDS BY RX/DR IN RCRD: CPT | Performed by: NURSE PRACTITIONER

## 2022-08-09 PROCEDURE — 3075F SYST BP GE 130 - 139MM HG: CPT | Performed by: NURSE PRACTITIONER

## 2022-08-09 PROCEDURE — 3725F SCREEN DEPRESSION PERFORMED: CPT | Performed by: NURSE PRACTITIONER

## 2022-08-09 PROCEDURE — 1125F AMNT PAIN NOTED PAIN PRSNT: CPT | Performed by: NURSE PRACTITIONER

## 2022-08-09 PROCEDURE — 3078F DIAST BP <80 MM HG: CPT | Performed by: NURSE PRACTITIONER

## 2022-08-09 RX ORDER — LOSARTAN POTASSIUM 100 MG/1
100 TABLET ORAL DAILY
Qty: 90 TABLET | Refills: 3 | Status: SHIPPED | OUTPATIENT
Start: 2022-08-09

## 2022-08-09 RX ORDER — OMEPRAZOLE 40 MG/1
40 CAPSULE, DELAYED RELEASE ORAL DAILY
Qty: 90 CAPSULE | Refills: 3 | Status: SHIPPED | OUTPATIENT
Start: 2022-08-09

## 2022-08-09 NOTE — PROGRESS NOTES
Assessment and Plan:     Problem List Items Addressed This Visit        Digestive    Acid reflux disease    Relevant Medications    omeprazole (PriLOSEC) 40 MG capsule       Endocrine    Thyroid nodule - Primary    Relevant Orders    Comprehensive metabolic panel    CBC and differential    TSH, 3rd generation with Free T4 reflex       Cardiovascular and Mediastinum    Essential hypertension    Relevant Medications    losartan (COZAAR) 100 MG tablet    Other Relevant Orders    Comprehensive metabolic panel    CBC and differential    TSH, 3rd generation with Free T4 reflex       Other    Medicare annual wellness visit, subsequent    Hyperlipidemia    Relevant Orders    Comprehensive metabolic panel    CBC and differential    TSH, 3rd generation with Free T4 reflex          Depression Screening and Follow-up Plan: Patient was screened for depression during today's encounter  They screened negative with a PHQ-2 score of 0  Preventive health issues were discussed with patient, and age appropriate screening tests were ordered as noted in patient's After Visit Summary  Personalized health advice and appropriate referrals for health education or preventive services given if needed, as noted in patient's After Visit Summary  History of Present Illness:     Patient presents for a Medicare Wellness Visit    Here for f/u to chronic medical condition  bp under control  gerd under control  Labs reviewed  Needs refills of meds  Here for medicare wellness       Patient Care Team:  IRAM Llamas as PCP - General (Family Medicine)  RANDY Snyder MD Lila Nigh, MD as Endoscopist     Review of Systems:     Review of Systems   Constitutional: Negative for fatigue and fever  HENT: Negative for congestion and postnasal drip  Eyes: Negative for photophobia and visual disturbance  Respiratory: Negative for cough, shortness of breath and wheezing      Cardiovascular: Negative for chest pain and palpitations  Gastrointestinal: Negative for constipation, diarrhea and vomiting  Genitourinary: Negative for dysuria and frequency  Musculoskeletal: Negative for arthralgias and myalgias  Skin: Negative for rash  Neurological: Negative for dizziness, light-headedness and headaches  Hematological: Negative for adenopathy  Psychiatric/Behavioral: Negative for dysphoric mood and sleep disturbance  The patient is not nervous/anxious  Problem List:     Patient Active Problem List   Diagnosis    Acid reflux disease    Essential hypertension    Hyperlipidemia    Spondylosis of cervical region without myelopathy or radiculopathy    Thyroid nodule    Vitamin D deficiency    Medicare annual wellness visit, subsequent      Past Medical and Surgical History:     Past Medical History:   Diagnosis Date    GERD (gastroesophageal reflux disease)     Hyperlipidemia     Hypertension     Spinal stenosis     Trapezius muscle spasm     last assessed - [id number]     Past Surgical History:   Procedure Laterality Date    BREAST CYST ASPIRATION Right 2010    CATARACT EXTRACTION, BILATERAL      DEBRIDEMENT TENNIS ELBOW      ELBOW SURGERY Right     tennis elbow    FASCIOTOMY ELBOW Right     of the lateral epicondyle; last assessed - 10Oct2014    HYSTERECTOMY      WIN-BSO    OOPHORECTOMY Bilateral     OH COLONOSCOPY FLX DX W/COLLJ SPEC WHEN PFRMD N/A 05/01/2017    Procedure: EGD AND COLONOSCOPY;  Surgeon: María Rivas MD;  Location: [facility];   Service: Gastroenterology      Family History:     Family History   Problem Relation Age of Onset    Cancer Sister     Colon cancer Sister 71    Diabetes Sister     Melanoma Mother     Cirrhosis Father         alcoholic    Stroke Father         CVA (cerebral vascular accident)    Arrhythmia Father         pacemaker placement    No Known Problems Maternal Grandmother     No Known Problems Maternal Grandfather     No Known Problems Paternal Grandmother     No Known Problems Paternal Grandfather     No Known Problems Sister     No Known Problems Sister     No Known Problems Sister     No Known Problems Sister     No Known Problems Maternal Aunt     No Known Problems Maternal Aunt     No Known Problems Maternal Aunt     No Known Problems Maternal Aunt     No Known Problems Maternal Aunt     No Known Problems Maternal Aunt     No Known Problems Paternal Aunt     No Known Problems Paternal Aunt     No Known Problems Paternal Aunt       Social History:     Social History     Socioeconomic History    Marital status: /Civil Union     Spouse name: None    Number of children: 2    Years of education: None    Highest education level: None   Occupational History    Occupation: Retired -  - [employer]   Tobacco Use    Smoking status: Light Tobacco Smoker     Packs/day: 0 50     Start date: 1962    Smokeless tobacco: Former User    Tobacco comment: Quit June 2014 - 1/2 ppd for 40 years per [employer]   Vaping Use    Vaping Use: Never used   Substance and Sexual Activity    Alcohol use: No     Comment: Consumes alcohol occasionally per [employer]    Drug use: No    Sexual activity: Not Currently   Other Topics Concern    None   Social History Narrative    Two children - [address]     Social Determinants of Health     Financial Resource Strain: Low Risk     Difficulty of Paying Living Expenses: Not hard at all   Food Insecurity: Not on file   Transportation Needs: No Transportation Needs    Lack of Transportation (Medical): No    Lack of Transportation (Non-Medical): No   Physical Activity: Not on file   Stress: Not on file   Social Connections: Not on file   Intimate Partner Violence: Not on file   Housing Stability: Not on file      Medications and Allergies:     Current Outpatient Medications   Medication Sig Dispense Refill    Cholecalciferol (VITAMIN D) 2000 UNITS CAPS Take by mouth        Glycerin-Hypromellose- (DRY EYE RELIEF DROPS OP) Apply to eye      losartan (COZAAR) 100 MG tablet Take 1 tablet (100 mg total) by mouth daily 90 tablet 3    omeprazole (PriLOSEC) 40 MG capsule Take 1 capsule (40 mg total) by mouth daily 90 capsule 3     No current facility-administered medications for this visit  Allergies   Allergen Reactions    Atorvastatin Edema    Other      Cholesterol med- name unknown and pneumonia vaccine    Pneumococcal Vaccines Edema      Immunizations:     Immunization History   Administered Date(s) Administered    COVID-19 PFIZER VACCINE 0 3 ML IM 01/21/2021, 02/09/2021, 10/23/2021    INFLUENZA 10/25/2016, 10/24/2017, 11/01/2018, 11/24/2018, 10/24/2019, 11/09/2021    Influenza Split High Dose Preservative Free IM 10/10/2014, 10/13/2015, 10/25/2016, 11/01/2018, 11/07/2019    Influenza, high dose seasonal 0 7 mL 10/24/2019    Influenza, recombinant, quadrivalent,injectable, preservative free 11/03/2020    Influenza, seasonal, injectable 10/08/2013    Influenza, seasonal, injectable, preservative free 11/24/2018    Pneumococcal Polysaccharide PPV23 10/12/2010    Zoster 05/31/2017      Health Maintenance:         Topic Date Due    Breast Cancer Screening: Mammogram  10/17/2021    Hepatitis C Screening  Completed         Topic Date Due    COVID-19 Vaccine (4 - Booster for Brock Devi series) 02/23/2022    Influenza Vaccine (1) 09/01/2022      Medicare Screening Tests and Risk Assessments:     Jeramie Mojica is here for her Subsequent Wellness visit  Last Medicare Wellness visit information reviewed, patient interviewed and updates made to the record today  Health Risk Assessment:   Patient rates overall health as good  Patient feels that their physical health rating is same  Patient is satisfied with their life  Eyesight was rated as same  Hearing was rated as same  Patient feels that their emotional and mental health rating is same   Patients states they are never, rarely angry  Patient states they are never, rarely unusually tired/fatigued  Pain experienced in the last 7 days has been none  Patient states that she has experienced no weight loss or gain in last 6 months  Depression Screening:   PHQ-2 Score: 0      Fall Risk Screening: In the past year, patient has experienced: no history of falling in past year      Urinary Incontinence Screening:   Patient has not leaked urine accidently in the last six months  Home Safety:  Patient does not have trouble with stairs inside or outside of their home  Patient has working smoke alarms and has working carbon monoxide detector  Home safety hazards include: none  Nutrition:   Current diet is Regular  Medications:   Patient is currently taking over-the-counter supplements  OTC medications include: see medication list  Patient is able to manage medications       Activities of Daily Living (ADLs)/Instrumental Activities of Daily Living (IADLs):   Walk and transfer into and out of bed and chair?: Yes  Dress and groom yourself?: Yes    Bathe or shower yourself?: Yes    Do your laundry/housekeeping?: Yes  Manage your money, pay your bills and track your expenses?: Yes  Make your own meals?: Yes    Do your own shopping?: Yes    Previous Hospitalizations:   Any hospitalizations or ED visits within the last 12 months?: Yes    How many hospitalizations have you had in the last year?: 1-2    Advance Care Planning:   Living will: No    Durable POA for healthcare: No    Advanced directive: No    Advanced directive counseling given: Yes    Five wishes given: Yes    End of Life Decisions reviewed with patient: Yes      Cognitive Screening:   Provider or family/friend/caregiver concerned regarding cognition?: No    PREVENTIVE SCREENINGS      Cardiovascular Screening:    General: Screening Not Indicated and History Lipid Disorder      Diabetes Screening:     General: Screening Current      Colorectal Cancer Screening:     General: Screening Not Indicated      Breast Cancer Screening:     General: Screening Not Indicated      Cervical Cancer Screening:    General: Screening Not Indicated      Osteoporosis Screening:    General: Screening Not Indicated      Abdominal Aortic Aneurysm (AAA) Screening:        General: Screening Not Indicated      Lung Cancer Screening:     General: Screening Not Indicated      Hepatitis C Screening:    General: Screening Current    Screening, Brief Intervention, and Referral to Treatment (SBIRT)    Screening  Typical number of drinks in a day: 0  Typical number of drinks in a week: 1  Interpretation: Low risk drinking behavior  Single Item Drug Screening:  How often have you used an illegal drug (including marijuana) or a prescription medication for non-medical reasons in the past year? never    Single Item Drug Screen Score: 0  Interpretation: Negative screen for possible drug use disorder    Brief Intervention  Alcohol & drug use screenings were reviewed  No concerns regarding substance use disorder identified  Other Counseling Topics:   Car/seat belt/driving safety, skin self-exam, sunscreen and calcium and vitamin D intake and regular weightbearing exercise  No exam data present     Physical Exam:     /70 (BP Location: Left arm, Patient Position: Sitting, Cuff Size: Standard)   Pulse 92   Temp 98 3 °F (36 8 °C)   Resp 16   Ht 5' 1 85" (1 571 m)   Wt 55 4 kg (122 lb 3 2 oz)   LMP 07/22/1986 (LMP Unknown) Comment: hysterectomy 30 years ago  SpO2 97%   BMI 22 46 kg/m²     Physical Exam  Vitals and nursing note reviewed  Constitutional:       Appearance: Normal appearance  HENT:      Head: Normocephalic and atraumatic        Right Ear: Tympanic membrane, ear canal and external ear normal       Left Ear: Tympanic membrane, ear canal and external ear normal       Nose: Nose normal       Mouth/Throat:      Mouth: Mucous membranes are moist    Eyes:      Extraocular Movements: Extraocular movements intact  Conjunctiva/sclera: Conjunctivae normal       Pupils: Pupils are equal, round, and reactive to light  Cardiovascular:      Rate and Rhythm: Normal rate and regular rhythm  Pulses: Normal pulses  Heart sounds: Normal heart sounds  Pulmonary:      Effort: Pulmonary effort is normal       Breath sounds: Normal breath sounds  Abdominal:      General: Bowel sounds are normal    Musculoskeletal:         General: Normal range of motion  Cervical back: Normal range of motion and neck supple  Skin:     General: Skin is warm  Capillary Refill: Capillary refill takes less than 2 seconds  Neurological:      General: No focal deficit present  Mental Status: She is alert and oriented to person, place, and time  Psychiatric:         Mood and Affect: Mood normal          Behavior: Behavior normal          Thought Content:  Thought content normal          Judgment: Judgment normal           IRAM Schrader

## 2022-08-09 NOTE — PATIENT INSTRUCTIONS
Medicare Preventive Visit Patient Instructions  Thank you for completing your Welcome to Medicare Visit or Medicare Annual Wellness Visit today  Your next wellness visit will be due in one year (8/10/2023)  The screening/preventive services that you may require over the next 5-10 years are detailed below  Some tests may not apply to you based off risk factors and/or age  Screening tests ordered at today's visit but not completed yet may show as past due  Also, please note that scanned in results may not display below  Preventive Screenings:  Service Recommendations Previous Testing/Comments   Colorectal Cancer Screening  * Colonoscopy    * Fecal Occult Blood Test (FOBT)/Fecal Immunochemical Test (FIT)  * Fecal DNA/Cologuard Test  * Flexible Sigmoidoscopy Age: 39-70 years old   Colonoscopy: every 10 years (may be performed more frequently if at higher risk)  OR  FOBT/FIT: every 1 year  OR  Cologuard: every 3 years  OR  Sigmoidoscopy: every 5 years  Screening may be recommended earlier than age 39 if at higher risk for colorectal cancer  Also, an individualized decision between you and your healthcare provider will decide whether screening between the ages of 74-80 would be appropriate  Colonoscopy: 05/01/2017  FOBT/FIT: Not on file  Cologuard: Not on file  Sigmoidoscopy: Not on file          Breast Cancer Screening Age: 36 years old  Frequency: every 1-2 years  Not required if history of left and right mastectomy Mammogram: 10/17/2019        Cervical Cancer Screening Between the ages of 21-29, pap smear recommended once every 3 years  Between the ages of 33-67, can perform pap smear with HPV co-testing every 5 years     Recommendations may differ for women with a history of total hysterectomy, cervical cancer, or abnormal pap smears in past  Pap Smear: Not on file    Screening Not Indicated   Hepatitis C Screening Once for adults born between [facility]  More frequently in patients at high risk for Hepatitis C Hep C Antibody: 01/23/2019    Screening Current   Diabetes Screening 1-2 times per year if you're at risk for diabetes or have pre-diabetes Fasting glucose: 81 mg/dL (11/8/2017)  A1C: No results in last 5 years (No results in last 5 years)  Screening Current   Cholesterol Screening Once every 5 years if you don't have a lipid disorder  May order more often based on risk factors  Lipid panel: 08/03/2022    Screening Not Indicated  History Lipid Disorder     Other Preventive Screenings Covered by Medicare:  1  Abdominal Aortic Aneurysm (AAA) Screening: covered once if your at risk  You're considered to be at risk if you have a family history of AAA  2  Lung Cancer Screening: covers low dose CT scan once per year if you meet all of the following conditions: (1) Age 50-69; (2) No signs or symptoms of lung cancer; (3) Current smoker or have quit smoking within the last 15 years; (4) You have a tobacco smoking history of at least 20 pack years (packs per day multiplied by number of years you smoked); (5) You get a written order from a healthcare provider  3  Glaucoma Screening: covered annually if you're considered high risk: (1) You have diabetes OR (2) Family history of glaucoma OR (3)  aged 48 and older OR (3)  American aged 72 and older  3  Osteoporosis Screening: covered every 2 years if you meet one of the following conditions: (1) You're estrogen deficient and at risk for osteoporosis based off medical history and other findings; (2) Have a vertebral abnormality; (3) On glucocorticoid therapy for more than 3 months; (4) Have primary hyperparathyroidism; (5) On osteoporosis medications and need to assess response to drug therapy  · Last bone density test (DXA Scan): Not on file  5  HIV Screening: covered annually if you're between the age of 12-76  Also covered annually if you are younger than 13 and older than 72 with risk factors for HIV infection   For pregnant patients, it is covered up to 3 times per pregnancy  Immunizations:  Immunization Recommendations   Influenza Vaccine Annual influenza vaccination during flu season is recommended for all persons aged >= 6 months who do not have contraindications   Pneumococcal Vaccine   * Pneumococcal conjugate vaccine = PCV13 (Prevnar 13), PCV15 (Vaxneuvance), PCV20 (Prevnar 20)  * Pneumococcal polysaccharide vaccine = PPSV23 (Pneumovax) Adults 25-60 years old: 1-3 doses may be recommended based on certain risk factors  Adults 72 years old: 1-2 doses may be recommended based off what pneumonia vaccine you previously received   Hepatitis B Vaccine 3 dose series if at intermediate or high risk (ex: diabetes, end stage renal disease, liver disease)   Tetanus (Td) Vaccine - COST NOT COVERED BY MEDICARE PART B Following completion of primary series, a booster dose should be given every 10 years to maintain immunity against tetanus  Td may also be given as tetanus wound prophylaxis  Tdap Vaccine - COST NOT COVERED BY MEDICARE PART B Recommended at least once for all adults  For pregnant patients, recommended with each pregnancy  Shingles Vaccine (Shingrix) - COST NOT COVERED BY MEDICARE PART B  2 shot series recommended in those aged 48 and above     Health Maintenance Due:      Topic Date Due    Colorectal Cancer Screening  05/01/2020    Breast Cancer Screening: Mammogram  10/17/2021    Hepatitis C Screening  Completed     Immunizations Due:      Topic Date Due    COVID-19 Vaccine (4 - Booster for Pfizer series) 02/23/2022    Influenza Vaccine (1) 09/01/2022     Advance Directives   What are advance directives? Advance directives are legal documents that state your wishes and plans for medical care  These plans are made ahead of time in case you lose your ability to make decisions for yourself  Advance directives can apply to any medical decision, such as the treatments you want, and if you want to donate organs     What are the types of advance directives? There are many types of advance directives, and each state has rules about how to use them  You may choose a combination of any of the following:  · Living will: This is a written record of the treatment you want  You can also choose which treatments you do not want, which to limit, and which to stop at a certain time  This includes surgery, medicine, IV fluid, and tube feedings  · Durable power of  for healthcare [facility]): This is a written record that states who you want to make healthcare choices for you when you are unable to make them for yourself  This person, called a proxy, is usually a family member or a friend  You may choose more than 1 proxy  · Do not resuscitate (DNR) order:  A DNR order is used in case your heart stops beating or you stop breathing  It is a request not to have certain forms of treatment, such as CPR  A DNR order may be included in other types of advance directives  · Medical directive: This covers the care that you want if you are in a coma, near death, or unable to make decisions for yourself  You can list the treatments you want for each condition  Treatment may include pain medicine, surgery, blood transfusions, dialysis, IV or tube feedings, and a ventilator (breathing machine)  · Values history: This document has questions about your views, beliefs, and how you feel and think about life  This information can help others choose the care that you would choose  Why are advance directives important? An advance directive helps you control your care  Although spoken wishes may be used, it is better to have your wishes written down  Spoken wishes can be misunderstood, or not followed  Treatments may be given even if you do not want them  An advance directive may make it easier for your family to make difficult choices about your care     Cigarette Smoking and Your Health   Risks to your health if you smoke:  Nicotine and other chemicals found in tobacco damage every cell in your body  Even if you are a light smoker, you have an increased risk for cancer, heart disease, and lung disease  If you are pregnant or have diabetes, smoking increases your risk for complications  Benefits to your health if you stop smoking:   · You decrease respiratory symptoms such as coughing, wheezing, and shortness of breath  · You reduce your risk for cancers of the lung, mouth, throat, kidney, bladder, pancreas, stomach, and cervix  If you already have cancer, you increase the benefits of chemotherapy  You also reduce your risk for cancer returning or a second cancer from developing  · You reduce your risk for heart disease, blood clots, heart attack, and stroke  · You reduce your risk for lung infections, and diseases such as pneumonia, asthma, chronic bronchitis, and emphysema  · Your circulation improves  More oxygen can be delivered to your body  If you have diabetes, you lower your risk for complications, such as kidney, artery, and eye diseases  You also lower your risk for nerve damage  Nerve damage can lead to amputations, poor vision, and blindness  · You improve your body's ability to heal and to fight infections  For more information and support to stop smoking:   · Smokefree  gov  Phone: 2- 693 - 197-4988  Web Address: www [employer]    [address] 2018 Information is for End User's use only and may not be sold, redistributed or otherwise used for commercial purposes   All illustrations and images included in CareNotes® are the copyrighted property of A D A M , Inc  or [address] [place] [address] [facility]

## 2022-09-09 ENCOUNTER — HOSPITAL ENCOUNTER (OUTPATIENT)
Dept: RADIOLOGY | Age: 82
Discharge: HOME | End: 2022-09-09
Payer: COMMERCIAL

## 2022-09-09 VITALS — HEIGHT: 61 IN | BODY MASS INDEX: 23.03 KG/M2 | WEIGHT: 122 LBS

## 2022-09-09 DIAGNOSIS — Z12.31 ENCOUNTER FOR SCREENING MAMMOGRAM FOR MALIGNANT NEOPLASM OF BREAST: ICD-10-CM

## 2022-09-09 PROCEDURE — 77063 BREAST TOMOSYNTHESIS BI: CPT

## 2022-09-09 PROCEDURE — 77067 SCR MAMMO BI INCL CAD: CPT

## 2022-10-11 PROBLEM — Z00.00 MEDICARE ANNUAL WELLNESS VISIT, SUBSEQUENT: Status: RESOLVED | Noted: 2022-08-09 | Resolved: 2022-10-11

## 2022-11-14 ENCOUNTER — TELEPHONE (OUTPATIENT)
Dept: NEUROLOGY | Facility: CLINIC | Age: 82
End: 2022-11-14

## 2022-11-14 NOTE — TELEPHONE ENCOUNTER
Reminder appt call     Patient is scheduled on 11/16/2022 @ 830 am with an arrival time  815 am in the [place] location with Dr Nehal Marcano

## 2022-11-16 ENCOUNTER — OFFICE VISIT (OUTPATIENT)
Dept: NEUROLOGY | Facility: CLINIC | Age: 82
End: 2022-11-16

## 2022-11-16 VITALS
HEIGHT: 62 IN | SYSTOLIC BLOOD PRESSURE: 160 MMHG | BODY MASS INDEX: 23 KG/M2 | TEMPERATURE: 97.4 F | WEIGHT: 125 LBS | HEART RATE: 85 BPM | DIASTOLIC BLOOD PRESSURE: 78 MMHG

## 2022-11-16 DIAGNOSIS — G50.9 TRIGEMINAL NEUROPATHY: Primary | ICD-10-CM

## 2022-11-16 DIAGNOSIS — H02.422 MYOGENIC PTOSIS, LEFT: ICD-10-CM

## 2022-11-16 DIAGNOSIS — Z98.49 H/O CATARACT REMOVAL WITH INSERTION OF PROSTHETIC LENS: ICD-10-CM

## 2022-11-16 DIAGNOSIS — G25.0 BENIGN ESSENTIAL TREMOR: ICD-10-CM

## 2022-11-16 DIAGNOSIS — R20.0 LEFT FACIAL NUMBNESS: ICD-10-CM

## 2022-11-16 DIAGNOSIS — Z96.1 H/O CATARACT REMOVAL WITH INSERTION OF PROSTHETIC LENS: ICD-10-CM

## 2022-12-08 ENCOUNTER — TELEPHONE (OUTPATIENT)
Dept: OTHER | Facility: OTHER | Age: 82
End: 2022-12-08

## 2022-12-08 NOTE — TELEPHONE ENCOUNTER
Call from patients  advising Pablo Menon is going to call in a script for the flu for him and he would also like to get the same script for the patient as she also has a sore throat, cough, runny nose and feverish  Please reach him to discuss

## 2022-12-09 ENCOUNTER — OFFICE VISIT (OUTPATIENT)
Dept: FAMILY MEDICINE CLINIC | Facility: CLINIC | Age: 82
End: 2022-12-09

## 2022-12-09 VITALS — TEMPERATURE: 98.6 F

## 2022-12-09 DIAGNOSIS — J11.1 INFLUENZA: Primary | ICD-10-CM

## 2022-12-09 RX ORDER — OSELTAMIVIR PHOSPHATE 75 MG/1
75 CAPSULE ORAL EVERY 12 HOURS SCHEDULED
Qty: 10 CAPSULE | Refills: 0 | Status: SHIPPED | OUTPATIENT
Start: 2022-12-09 | End: 2022-12-14

## 2022-12-09 RX ORDER — OSELTAMIVIR PHOSPHATE 30 MG/1
CAPSULE ORAL
Qty: 10 CAPSULE | Refills: 0 | Status: SHIPPED | OUTPATIENT
Start: 2022-12-09 | End: 2022-12-09

## 2022-12-09 RX ORDER — OSELTAMIVIR PHOSPHATE 75 MG/1
75 CAPSULE ORAL EVERY 12 HOURS SCHEDULED
Qty: 10 CAPSULE | Refills: 0 | Status: SHIPPED | OUTPATIENT
Start: 2022-12-09 | End: 2022-12-09 | Stop reason: SDUPTHER

## 2022-12-09 NOTE — PROGRESS NOTES
COVID-19 Outpatient Progress Note    Assessment/Plan:    Problem List Items Addressed This Visit        Respiratory    Influenza - Primary     Presumed influenza,  tested positive  Sx onset 12/7 with cough, sore throat, rhinorrhea, body aches, diarrhea, congestion, fatigue  Denies fever, chills, headache, n/v, sob  Tolerating PO  Will start on renal dosing tamiflu, continue otc meds for sx relief prn  Pt instructed to call for reevaluation if sx worsen or persist              Disposition:     Discussed symptom directed medication options with patient  I have spent 15 minutes directly with the patient  Greater than 50% of this time was spent in counseling/coordination of care regarding: prognosis, risks and benefits of treatment options, instructions for management, patient and family education, importance of treatment compliance, risk factor reductions and impressions  Encounter provider: IRAM Malhotra     Provider located at: [address] [address]     Recent Visits  No visits were found meeting these conditions  Showing recent visits within past 7 days and meeting all other requirements  Today's Visits  Date Type Provider Dept   12/09/22 Office Visit Geno Daquan Geiger, [address] [place] [address] today's visits and meeting all other requirements  Future Appointments  No visits were found meeting these conditions  Showing future appointments within next 150 days and meeting all other requirements     This virtual check-in was done via telephone and she agrees to proceed  Patient agrees to participate in a virtual check in via telephone or video visit instead of presenting to the office to address urgent/immediate medical needs  Patient is aware this is a billable service  She acknowledged consent and understanding of privacy and security of the video platform   The patient has agreed to participate and understands they can discontinue the visit at any time  After connecting through Telephone, the patient was identified by name and date of birth  Tarah Escalante was informed that this was a telemedicine visit and that the exam was being conducted confidentially over secure lines  My office door was closed  No one else was in the room  Tarah Escalante acknowledged consent and understanding of privacy and security of the telemedicine visit  I informed the patient that I have reviewed her record in Epic and presented the opportunity for her to ask any questions regarding the visit today  The patient agreed to participate  It was my intent to perform this visit via video technology but the patient was not able to do a video connection so the visit was completed via audio telephone only  Verification of patient location:  Patient is located in the following state in which I hold an active license: [place]    Subjective:   Tarah Escalante is a [de-identified] y o  female who is concerned about COVID-19  Patient's symptoms include fatigue, nasal congestion, sore throat, cough, diarrhea and myalgias  Patient denies fever, chills, malaise, rhinorrhea, anosmia, loss of taste, shortness of breath, chest tightness, abdominal pain, nausea, vomiting and headaches       - Date of symptom onset: 12/7/2022      COVID-19 vaccination status: Fully vaccinated with booster    Exposure:   Contact with a person who is under investigation (PUI) for or who is positive for COVID-19 within the last 14 days?: No    Hospitalized recently for fever and/or lower respiratory symptoms?: No      Currently a healthcare worker that is involved in direct patient care?: No      Works in a special setting where the risk of COVID-19 transmission may be high? (this may include long-term care, correctional and senior care facilities; homeless shelters; assisted-living facilities and group homes ): No      Resident in a special setting where the risk of COVID-19 transmission may be high? (this may include long-term care, correctional and alf facilities; homeless shelters; assisted-living facilities and group homes ): No      Lab Results   Component Value Date    SARSCOV2 Negative 08/20/2021       Review of Systems   Constitutional: Positive for fatigue  Negative for chills and fever  HENT: Positive for congestion and sore throat  Negative for rhinorrhea  Respiratory: Positive for cough  Negative for chest tightness and shortness of breath  Gastrointestinal: Positive for diarrhea  Negative for abdominal pain, nausea and vomiting  Musculoskeletal: Positive for myalgias  Neurological: Negative for headaches  Current Outpatient Medications on File Prior to Visit   Medication Sig   • Cholecalciferol (VITAMIN D) 2000 UNITS CAPS Take by mouth     • Glycerin-Hypromellose- (DRY EYE RELIEF DROPS OP) Apply to eye   • losartan (COZAAR) 100 MG tablet Take 1 tablet (100 mg total) by mouth daily   • omeprazole (PriLOSEC) 40 MG capsule Take 1 capsule (40 mg total) by mouth daily       Objective:    Temp 98 6 °F (37 °C) (Tympanic)   LMP 07/22/1986 (LMP Unknown) Comment: hysterectomy 30 years ago     Physical Exam  IRAM Gutiérrez

## 2022-12-09 NOTE — ASSESSMENT & PLAN NOTE
Presumed influenza,  tested positive  Sx onset 12/7 with cough, sore throat, rhinorrhea, body aches, diarrhea, congestion, fatigue  Denies fever, chills, headache, n/v, sob  Tolerating PO  Will start on renal dosing tamiflu, continue otc meds for sx relief prn    Pt instructed to call for reevaluation if sx worsen or persist

## 2022-12-21 ENCOUNTER — APPOINTMENT (EMERGENCY)
Dept: CT IMAGING | Facility: HOSPITAL | Age: 82
DRG: 195 | End: 2022-12-21
Payer: COMMERCIAL

## 2022-12-21 ENCOUNTER — APPOINTMENT (EMERGENCY)
Dept: RADIOLOGY | Facility: HOSPITAL | Age: 82
DRG: 195 | End: 2022-12-21
Payer: COMMERCIAL

## 2022-12-21 ENCOUNTER — HOSPITAL ENCOUNTER (INPATIENT)
Facility: HOSPITAL | Age: 82
LOS: 4 days | Discharge: HOME | DRG: 195 | End: 2022-12-25
Attending: EMERGENCY MEDICINE | Admitting: INTERNAL MEDICINE
Payer: COMMERCIAL

## 2022-12-21 DIAGNOSIS — R78.81 BACTEREMIA: ICD-10-CM

## 2022-12-21 DIAGNOSIS — J18.9 PNEUMONIA OF BOTH LOWER LOBES DUE TO INFECTIOUS ORGANISM: Primary | ICD-10-CM

## 2022-12-21 DIAGNOSIS — K80.80 BILIARY CALCULUS OF OTHER SITE WITHOUT OBSTRUCTION: ICD-10-CM

## 2022-12-21 LAB
2HR DELTA HS TROPONIN: 2 NG/L
ALBUMIN SERPL BCG-MCNC: 3.6 G/DL (ref 3.5–5)
ALP SERPL-CCNC: 102 U/L (ref 34–104)
ALT SERPL W P-5'-P-CCNC: 9 U/L (ref 7–52)
ANION GAP SERPL CALCULATED.3IONS-SCNC: 10 MMOL/L (ref 4–13)
APTT PPP: 31 SECONDS (ref 23–37)
AST SERPL W P-5'-P-CCNC: 14 U/L (ref 13–39)
BACTERIA UR QL AUTO: NORMAL /HPF
BASOPHILS # BLD AUTO: 0.06 THOUSANDS/ÂΜL (ref 0–0.1)
BASOPHILS NFR BLD AUTO: 0 % (ref 0–1)
BILIRUB SERPL-MCNC: 0.63 MG/DL (ref 0.2–1)
BILIRUB UR QL STRIP: NEGATIVE
BUN SERPL-MCNC: 11 MG/DL (ref 5–25)
CALCIUM SERPL-MCNC: 8.9 MG/DL (ref 8.4–10.2)
CARDIAC TROPONIN I PNL SERPL HS: 14 NG/L
CARDIAC TROPONIN I PNL SERPL HS: 16 NG/L
CHLORIDE SERPL-SCNC: 104 MMOL/L (ref 96–108)
CLARITY UR: CLEAR
CO2 SERPL-SCNC: 26 MMOL/L (ref 21–32)
COLOR UR: ABNORMAL
CREAT SERPL-MCNC: 0.54 MG/DL (ref 0.6–1.3)
D DIMER PPP FEU-MCNC: 0.51 UG/ML FEU
EOSINOPHIL # BLD AUTO: 0.31 THOUSAND/ÂΜL (ref 0–0.61)
EOSINOPHIL NFR BLD AUTO: 2 % (ref 0–6)
ERYTHROCYTE [DISTWIDTH] IN BLOOD BY AUTOMATED COUNT: 13.2 % (ref 11.6–15.1)
FLUAV RNA RESP QL NAA+PROBE: NEGATIVE
FLUBV RNA RESP QL NAA+PROBE: NEGATIVE
GFR SERPL CREATININE-BSD FRML MDRD: 89 ML/MIN/1.73SQ M
GLUCOSE SERPL-MCNC: 112 MG/DL (ref 65–140)
GLUCOSE UR STRIP-MCNC: NEGATIVE MG/DL
HCT VFR BLD AUTO: 38.7 % (ref 34.8–46.1)
HGB BLD-MCNC: 12.9 G/DL (ref 11.5–15.4)
HGB UR QL STRIP.AUTO: NEGATIVE
IMM GRANULOCYTES # BLD AUTO: 0.12 THOUSAND/UL (ref 0–0.2)
IMM GRANULOCYTES NFR BLD AUTO: 1 % (ref 0–2)
INR PPP: 1.01 (ref 0.84–1.19)
KETONES UR STRIP-MCNC: ABNORMAL MG/DL
LACTATE SERPL-SCNC: 0.9 MMOL/L (ref 0.5–2)
LEUKOCYTE ESTERASE UR QL STRIP: NEGATIVE
LYMPHOCYTES # BLD AUTO: 0.77 THOUSANDS/ÂΜL (ref 0.6–4.47)
LYMPHOCYTES NFR BLD AUTO: 4 % (ref 14–44)
MCH RBC QN AUTO: 31.7 PG (ref 26.8–34.3)
MCHC RBC AUTO-ENTMCNC: 33.3 G/DL (ref 31.4–37.4)
MCV RBC AUTO: 95 FL (ref 82–98)
MONOCYTES # BLD AUTO: 0.97 THOUSAND/ÂΜL (ref 0.17–1.22)
MONOCYTES NFR BLD AUTO: 5 % (ref 4–12)
NEUTROPHILS # BLD AUTO: 19.09 THOUSANDS/ÂΜL (ref 1.85–7.62)
NEUTS SEG NFR BLD AUTO: 88 % (ref 43–75)
NITRITE UR QL STRIP: NEGATIVE
NON-SQ EPI CELLS URNS QL MICRO: NORMAL /HPF
NRBC BLD AUTO-RTO: 0 /100 WBCS
PH UR STRIP.AUTO: 7 [PH]
PLATELET # BLD AUTO: 542 THOUSANDS/UL (ref 149–390)
PMV BLD AUTO: 8.8 FL (ref 8.9–12.7)
POTASSIUM SERPL-SCNC: 3.2 MMOL/L (ref 3.5–5.3)
PROCALCITONIN SERPL-MCNC: 0.09 NG/ML
PROT SERPL-MCNC: 7 G/DL (ref 6.4–8.4)
PROT UR STRIP-MCNC: ABNORMAL MG/DL
PROTHROMBIN TIME: 13.5 SECONDS (ref 11.6–14.5)
RBC # BLD AUTO: 4.07 MILLION/UL (ref 3.81–5.12)
RBC #/AREA URNS AUTO: NORMAL /HPF
RSV RNA RESP QL NAA+PROBE: NEGATIVE
SARS-COV-2 RNA RESP QL NAA+PROBE: NEGATIVE
SODIUM SERPL-SCNC: 140 MMOL/L (ref 135–147)
SP GR UR STRIP.AUTO: 1.01 (ref 1–1.03)
UROBILINOGEN UR STRIP-ACNC: <2 MG/DL
WBC # BLD AUTO: 21.32 THOUSAND/UL (ref 4.31–10.16)
WBC #/AREA URNS AUTO: NORMAL /HPF

## 2022-12-21 PROCEDURE — 85379 FIBRIN DEGRADATION QUANT: CPT | Performed by: PHYSICIAN ASSISTANT

## 2022-12-21 PROCEDURE — 71260 CT THORAX DX C+: CPT

## 2022-12-21 PROCEDURE — 99285 EMERGENCY DEPT VISIT HI MDM: CPT

## 2022-12-21 PROCEDURE — 81001 URINALYSIS AUTO W/SCOPE: CPT | Performed by: PHYSICIAN ASSISTANT

## 2022-12-21 PROCEDURE — 96374 THER/PROPH/DIAG INJ IV PUSH: CPT

## 2022-12-21 PROCEDURE — 85730 THROMBOPLASTIN TIME PARTIAL: CPT | Performed by: PHYSICIAN ASSISTANT

## 2022-12-21 PROCEDURE — 85025 COMPLETE CBC W/AUTO DIFF WBC: CPT | Performed by: EMERGENCY MEDICINE

## 2022-12-21 PROCEDURE — 93005 ELECTROCARDIOGRAM TRACING: CPT

## 2022-12-21 PROCEDURE — G1004 CDSM NDSC: HCPCS

## 2022-12-21 PROCEDURE — 87186 SC STD MICRODIL/AGAR DIL: CPT | Performed by: PHYSICIAN ASSISTANT

## 2022-12-21 PROCEDURE — 84484 ASSAY OF TROPONIN QUANT: CPT | Performed by: EMERGENCY MEDICINE

## 2022-12-21 PROCEDURE — 87154 CUL TYP ID BLD PTHGN 6+ TRGT: CPT | Performed by: PHYSICIAN ASSISTANT

## 2022-12-21 PROCEDURE — 74177 CT ABD & PELVIS W/CONTRAST: CPT

## 2022-12-21 PROCEDURE — 36415 COLL VENOUS BLD VENIPUNCTURE: CPT

## 2022-12-21 PROCEDURE — 85610 PROTHROMBIN TIME: CPT | Performed by: PHYSICIAN ASSISTANT

## 2022-12-21 PROCEDURE — 71045 X-RAY EXAM CHEST 1 VIEW: CPT

## 2022-12-21 PROCEDURE — 0241U HB NFCT DS VIR RESP RNA 4 TRGT: CPT | Performed by: PHYSICIAN ASSISTANT

## 2022-12-21 PROCEDURE — 84145 PROCALCITONIN (PCT): CPT | Performed by: PHYSICIAN ASSISTANT

## 2022-12-21 PROCEDURE — 87040 BLOOD CULTURE FOR BACTERIA: CPT | Performed by: PHYSICIAN ASSISTANT

## 2022-12-21 PROCEDURE — 99285 EMERGENCY DEPT VISIT HI MDM: CPT | Performed by: PHYSICIAN ASSISTANT

## 2022-12-21 PROCEDURE — 80053 COMPREHEN METABOLIC PANEL: CPT | Performed by: EMERGENCY MEDICINE

## 2022-12-21 PROCEDURE — 87077 CULTURE AEROBIC IDENTIFY: CPT | Performed by: PHYSICIAN ASSISTANT

## 2022-12-21 PROCEDURE — 83605 ASSAY OF LACTIC ACID: CPT | Performed by: PHYSICIAN ASSISTANT

## 2022-12-21 RX ORDER — LABETALOL HYDROCHLORIDE 5 MG/ML
10 INJECTION, SOLUTION INTRAVENOUS ONCE
Status: COMPLETED | OUTPATIENT
Start: 2022-12-21 | End: 2022-12-21

## 2022-12-21 RX ORDER — ACETAMINOPHEN 325 MG/1
650 TABLET ORAL ONCE
Status: COMPLETED | OUTPATIENT
Start: 2022-12-21 | End: 2022-12-21

## 2022-12-21 RX ORDER — LIDOCAINE 50 MG/G
1 PATCH TOPICAL ONCE
Status: COMPLETED | OUTPATIENT
Start: 2022-12-21 | End: 2022-12-22

## 2022-12-21 RX ADMIN — CEFTRIAXONE 1000 MG: 1 INJECTION, POWDER, FOR SOLUTION INTRAMUSCULAR; INTRAVENOUS at 23:45

## 2022-12-21 RX ADMIN — ACETAMINOPHEN 650 MG: 325 TABLET, FILM COATED ORAL at 20:51

## 2022-12-21 RX ADMIN — IOHEXOL 100 ML: 350 INJECTION, SOLUTION INTRAVENOUS at 21:49

## 2022-12-21 RX ADMIN — LIDOCAINE 5% 1 PATCH: 700 PATCH TOPICAL at 20:53

## 2022-12-21 RX ADMIN — LABETALOL HYDROCHLORIDE 10 MG: 5 INJECTION, SOLUTION INTRAVENOUS at 20:53

## 2022-12-22 PROBLEM — R10.9 FLANK PAIN: Status: ACTIVE | Noted: 2022-12-22

## 2022-12-22 PROBLEM — J18.9 MULTIFOCAL PNEUMONIA: Status: ACTIVE | Noted: 2022-12-22

## 2022-12-22 PROBLEM — I10 HYPERTENSION: Status: ACTIVE | Noted: 2022-12-22

## 2022-12-22 PROBLEM — R93.2 ABNORMAL CT SCAN, GALLBLADDER: Status: ACTIVE | Noted: 2022-12-22

## 2022-12-22 PROBLEM — E87.6 HYPOKALEMIA: Status: ACTIVE | Noted: 2022-12-22

## 2022-12-22 LAB
4HR DELTA HS TROPONIN: -1 NG/L
CARDIAC TROPONIN I PNL SERPL HS: 13 NG/L
L PNEUMO1 AG UR QL IA.RAPID: NEGATIVE
PLATELET # BLD AUTO: 488 THOUSANDS/UL (ref 149–390)
PMV BLD AUTO: 8.9 FL (ref 8.9–12.7)
PROCALCITONIN SERPL-MCNC: 0.1 NG/ML
S PNEUM AG UR QL: POSITIVE

## 2022-12-22 PROCEDURE — 87449 NOS EACH ORGANISM AG IA: CPT

## 2022-12-22 PROCEDURE — NC001 PR NO CHARGE: Performed by: SURGERY

## 2022-12-22 PROCEDURE — 36415 COLL VENOUS BLD VENIPUNCTURE: CPT | Performed by: EMERGENCY MEDICINE

## 2022-12-22 PROCEDURE — 87081 CULTURE SCREEN ONLY: CPT

## 2022-12-22 PROCEDURE — 87070 CULTURE OTHR SPECIMN AEROBIC: CPT

## 2022-12-22 PROCEDURE — 99254 IP/OBS CNSLTJ NEW/EST MOD 60: CPT | Performed by: SURGERY

## 2022-12-22 PROCEDURE — 84484 ASSAY OF TROPONIN QUANT: CPT | Performed by: EMERGENCY MEDICINE

## 2022-12-22 PROCEDURE — 99223 1ST HOSP IP/OBS HIGH 75: CPT | Performed by: INTERNAL MEDICINE

## 2022-12-22 PROCEDURE — 84145 PROCALCITONIN (PCT): CPT

## 2022-12-22 PROCEDURE — 85049 AUTOMATED PLATELET COUNT: CPT

## 2022-12-22 PROCEDURE — 87205 SMEAR GRAM STAIN: CPT

## 2022-12-22 RX ORDER — LOSARTAN POTASSIUM 50 MG/1
100 TABLET ORAL DAILY
Status: DISCONTINUED | OUTPATIENT
Start: 2022-12-22 | End: 2022-12-25 | Stop reason: HOSPADM

## 2022-12-22 RX ORDER — AZITHROMYCIN 250 MG/1
500 TABLET, FILM COATED ORAL ONCE
Status: COMPLETED | OUTPATIENT
Start: 2022-12-22 | End: 2022-12-22

## 2022-12-22 RX ORDER — ENOXAPARIN SODIUM 100 MG/ML
40 INJECTION SUBCUTANEOUS DAILY
Status: DISCONTINUED | OUTPATIENT
Start: 2022-12-22 | End: 2022-12-25 | Stop reason: HOSPADM

## 2022-12-22 RX ORDER — POTASSIUM CHLORIDE 1500 MG/1
40 TABLET, EXTENDED RELEASE ORAL ONCE
Status: COMPLETED | OUTPATIENT
Start: 2022-12-22 | End: 2022-12-22

## 2022-12-22 RX ORDER — GUAIFENESIN 600 MG/1
600 TABLET, EXTENDED RELEASE ORAL EVERY 12 HOURS SCHEDULED
Status: DISCONTINUED | OUTPATIENT
Start: 2022-12-22 | End: 2022-12-25 | Stop reason: HOSPADM

## 2022-12-22 RX ORDER — BENZONATATE 100 MG/1
200 CAPSULE ORAL 3 TIMES DAILY
Status: DISCONTINUED | OUTPATIENT
Start: 2022-12-22 | End: 2022-12-25 | Stop reason: HOSPADM

## 2022-12-22 RX ORDER — ACETAMINOPHEN 325 MG/1
650 TABLET ORAL EVERY 6 HOURS PRN
Status: DISCONTINUED | OUTPATIENT
Start: 2022-12-22 | End: 2022-12-25 | Stop reason: HOSPADM

## 2022-12-22 RX ORDER — PANTOPRAZOLE SODIUM 40 MG/1
40 TABLET, DELAYED RELEASE ORAL DAILY
Status: DISCONTINUED | OUTPATIENT
Start: 2022-12-22 | End: 2022-12-25 | Stop reason: HOSPADM

## 2022-12-22 RX ADMIN — GUAIFENESIN 600 MG: 600 TABLET, EXTENDED RELEASE ORAL at 01:28

## 2022-12-22 RX ADMIN — ENOXAPARIN SODIUM 40 MG: 40 INJECTION SUBCUTANEOUS at 08:23

## 2022-12-22 RX ADMIN — CEFTRIAXONE 1000 MG: 1 INJECTION, POWDER, FOR SOLUTION INTRAMUSCULAR; INTRAVENOUS at 23:48

## 2022-12-22 RX ADMIN — ACETAMINOPHEN 650 MG: 325 TABLET ORAL at 09:14

## 2022-12-22 RX ADMIN — POTASSIUM CHLORIDE 40 MEQ: 1500 TABLET, EXTENDED RELEASE ORAL at 02:15

## 2022-12-22 RX ADMIN — GUAIFENESIN 600 MG: 600 TABLET, EXTENDED RELEASE ORAL at 20:07

## 2022-12-22 RX ADMIN — GUAIFENESIN 600 MG: 600 TABLET, EXTENDED RELEASE ORAL at 09:09

## 2022-12-22 RX ADMIN — AZITHROMYCIN MONOHYDRATE 500 MG: 250 TABLET ORAL at 20:07

## 2022-12-22 RX ADMIN — LOSARTAN POTASSIUM 100 MG: 50 TABLET, FILM COATED ORAL at 08:23

## 2022-12-22 RX ADMIN — PANTOPRAZOLE SODIUM 40 MG: 40 TABLET, DELAYED RELEASE ORAL at 08:23

## 2022-12-22 RX ADMIN — BENZONATATE 200 MG: 100 CAPSULE ORAL at 08:23

## 2022-12-22 RX ADMIN — BENZONATATE 200 MG: 100 CAPSULE ORAL at 20:07

## 2022-12-22 RX ADMIN — AZITHROMYCIN MONOHYDRATE 500 MG: 500 INJECTION, POWDER, LYOPHILIZED, FOR SOLUTION INTRAVENOUS at 00:58

## 2022-12-22 RX ADMIN — BENZONATATE 200 MG: 100 CAPSULE ORAL at 15:39

## 2022-12-22 NOTE — ASSESSMENT & PLAN NOTE
CT Chest Abdomen Pelvis: Distended gallbladder with gallstones    Further evaluation with right upper quadrant sonogram was recommended to rule out acute cholecystitis  · Normal AST/ALT Alk Phos  · Denies RUQ abdominal pain, negative mendoza's  · Was evaluated by General Surgery in the ED who reported that there was no current indications for surgical/procedural interventions at this time, and no need for further GB imaging

## 2022-12-22 NOTE — UTILIZATION REVIEW
Initial Clinical Review    Admission: Date/Time/Statement:   Admission Orders (From admission, onward)     Ordered        12/21/22 2340  [address] [facility] [place] [place]  Once                      Orders Placed This Encounter   Procedures   • INPATIENT ADMISSION     Standing Status:   Standing     Number of Occurrences:   1     Order Specific Question:   Level of Care     Answer:   Med Surg [16]     Order Specific Question:   Estimated length of stay     Answer:   More than 2 Midnights     Order Specific Question:   Certification     Answer:   I certify that inpatient services are medically necessary for this patient for a duration of greater than two midnights  See H&P and MD Progress Notes for additional information about the patient's course of treatment  ED Arrival Information     Expected   -    Arrival   12/21/2022 19:21    Acuity   Urgent            Means of arrival   Walk-In    Escorted by   Spouse    Service   Hospitalist    Admission type   Emergency            Arrival complaint   Left sided pain when chough            Chief Complaint   Patient presents with   • Chest Pain     Pt with the flu for a couple weeks reports left upper side pain with movement or exertion/coughing       Initial Presentation: [de-identified] y o  female with a PMH of hypertension, hyperlipidemia and  GERD  She presents to the ED with complaint of cough  She reports she developed flu like symptoms 3 days after her  tested positive for influenza  She complains of NP cough, sore throat, body aches, rhinorrhea, congestion, fatigue and diarrhea  She was seen by her PCP on 12/9 she tested negative for flu but was treated with tamiflu for presumptive influenza  Se now notes worsening cough over the last week, productive with green sputum  She reports she developed left side flank pain 4 days prior, rated as dull 6 to 4/10, she reports pain worsens with movement and pressure  CT Chest revealed scattered airspace opacities in the lower lobes    She received Rocephin and Azithromax in the ED  She is admitted inpatient due to multifocal pneumonia, left side flank pain, started on lidocaine patch  Hypokalemia 3 2 , replenishment  CT abd with abnormal GB, eval by surgery , no current indication for surgical intervention and no need for further GB imaging  General Surgery Consult - 12/22 - [de-identified] yo f with multifocal pneumonia in the setting of influenza, cough, L flank pain  Consulted for incidental GB distention with cholelithiasis  Low suspicion for cholecystitis  No indication for surgical intervention for the GB, OK for regular diet, no need for additional GB imaging  Date: 12/22/22   Day 2:   no clinical signs or symptoms of acute calculus cholecystitis  Can follow up as an OP to further discuss gallbladder intervention if she would like  Needs to continue  Treatment  for her multifocal pneumonia   RA sat 91-95%      ED Triage Vitals   Temperature Pulse Respirations Blood Pressure SpO2   12/21/22 1945 12/21/22 1945 12/21/22 1945 12/21/22 1948 12/21/22 1945   99 1 °F (37 3 °C) 83 18 (!) 230/95 95 %      Temp Source Heart Rate Source Patient Position - Orthostatic VS BP Location FiO2 (%)   12/21/22 1945 12/21/22 2300 12/22/22 0120 12/21/22 2300 --   Oral Monitor Lying Left arm       Pain Score       12/21/22 2051       6          Wt Readings from Last 1 Encounters:   12/21/22 56 7 kg (125 lb)     Additional Vital Signs:   Date/Time Temp Pulse Resp BP MAP (mmHg) SpO2 O2 Device Patient Position - Orthostatic VS   12/22/22 1531 98 2 °F (36 8 °C) 78 18 184/71 Abnormal  111 95 % None (Room air) Lying   12/22/22 0700 98 6 °F (37 °C) 74 -- 145/65 -- 91 % None (Room air) Lying   12/22/22 0120 98 2 °F (36 8 °C) 73 19 158/70 101 94 % None (Room air) Lying   12/21/22 2300 -- 81 18 166/72 104 93 % None (Room air) --   12/21/22 2103 -- 75 16 166/75 111 93 % -- --   12/21/22 2057 -- 86 18 204/83 Abnormal  119 94 % None (Room air) --   12/21/22 2037 -- -- -- 197/83 Abnormal  -- -- -- --   12/21/22 1948 -- -- -- 230/95 Abnormal  -- -- -- --   12/21/22 1945 99 1 °F (37 3 °C) 83 18 -- -- 95 % None (Room air) --     Pertinent Labs/Diagnostic Test Results:   CT chest abdomen pelvis w contrast   Final Result by Lev Quispe DO (12/21 2316)      Scattered airspace opacities in the lower lobes which may represent infection  Recommend short-term follow-up chest CT scan in 3 months evaluate for resolution  Distended gallbladder with gallstones  Further evaluation with right upper quadrant sonogram is recommended to rule out acute cholecystitis  XR chest 1 view portable    (Results Pending)     ECG 12/21 -   Collection Time Result Time Vent R Atrial R HI Int  QRSD Int  QT Int  QTC Int   P Axis QRS Axis T Wave Ax    12/21/22 19:49:35 12/21/22 19:49:52 84 84 110 78 360 425 73 57 45     ECG 12/21  -  ECG rate:  84     ECG rate assessment: normal       Rhythm: sinus rhythm       Ectopy: none       QRS axis:  Normal     Conduction: normal       ST segments:  Normal     T waves: normal          Results from last 7 days   Lab Units 12/21/22 2103   SARS-COV-2  Negative     Results from last 7 days   Lab Units 12/22/22 0126 12/21/22 1953   WBC Thousand/uL  --  21 32*   HEMOGLOBIN g/dL  --  12 9   HEMATOCRIT %  --  38 7   PLATELETS Thousands/uL 488* 542*   NEUTROS ABS Thousands/µL  --  19 09*         Results from last 7 days   Lab Units 12/21/22 1953   SODIUM mmol/L 140   POTASSIUM mmol/L 3 2*   CHLORIDE mmol/L 104   CO2 mmol/L 26   ANION GAP mmol/L 10   BUN mg/dL 11   CREATININE mg/dL 0 54*   EGFR ml/min/1 73sq m 89   CALCIUM mg/dL 8 9     Results from last 7 days   Lab Units 12/21/22 1953   AST U/L 14   ALT U/L 9   ALK PHOS U/L 102   TOTAL PROTEIN g/dL 7 0   ALBUMIN g/dL 3 6   TOTAL BILIRUBIN mg/dL 0 63       Results from last 7 days   Lab Units 12/21/22 1953   GLUCOSE RANDOM mg/dL 112       Results from last 7 days   Lab Units 12/22/22 0126 12/21/22 2245 12/21/22 1953   HS TNI 0HR ng/L  --   --  14   HS TNI 2HR ng/L  --  16  --    HSTNI D2 ng/L  --  2  --    HS TNI 4HR ng/L 13  --   --    HSTNI D4 ng/L -1  --   --      Results from last 7 days   Lab Units 12/21/22 1953   D-DIMER QUANTITATIVE ug/ml FEU 0 51*     Results from last 7 days   Lab Units 12/21/22 1953   PROTIME seconds 13 5   INR  1 01   PTT seconds 31         Results from last 7 days   Lab Units 12/22/22  0525 12/21/22 1953   PROCALCITONIN ng/ml 0 10 0 09     Results from last 7 days   Lab Units 12/21/22 2103   LACTIC ACID mmol/L 0 9       Results from last 7 days   Lab Units 12/21/22  2245   CLARITY UA  Clear   COLOR UA  Light Yellow   SPEC GRAV UA  1 013   PH UA  7 0   GLUCOSE UA mg/dl Negative   KETONES UA mg/dl 10 (1+)*   BLOOD UA  Negative   PROTEIN UA mg/dl Trace*   NITRITE UA  Negative   BILIRUBIN UA  Negative   UROBILINOGEN UA (BE) mg/dl <2 0   LEUKOCYTES UA  Negative   WBC UA /hpf None Seen   RBC UA /hpf 1-2   BACTERIA UA /hpf None Seen   EPITHELIAL CELLS WET PREP /hpf None Seen     Results from last 7 days   Lab Units 12/21/22 2103   INFLUENZA A PCR  Negative   INFLUENZA B PCR  Negative   RSV PCR  Negative       Results from last 7 days   Lab Units 12/21/22 2103   BLOOD CULTURE  Received in Microbiology Lab  Culture in Progress  Received in Microbiology Lab  Culture in Progress           ED Treatment:   Medication Administration from 12/21/2022 1921 to 12/22/2022 0815       Date/Time Order Dose Route Action Comments     12/21/2022 2051 EST acetaminophen (TYLENOL) tablet 650 mg 650 mg Oral Given --     12/21/2022 2053 EST lidocaine (LIDODERM) 5 % patch 1 patch 1 patch Topical Medication Applied --     12/21/2022 2053 EST labetalol (NORMODYNE) injection 10 mg 10 mg Intravenous Given --     12/21/2022 2149 EST iohexol (OMNIPAQUE) 350 MG/ML injection (SINGLE-DOSE) 100 mL 100 mL Intravenous Given --     12/21/2022 2345 EST ceftriaxone (ROCEPHIN) 1 g/50 mL in dextrose IVPB 1,000 mg Intravenous New Bag --     12/22/2022 0058 EST azithromycin (ZITHROMAX) 500 mg in sodium chloride 0 9% 250mL IVPB 500 mg 500 mg Intravenous New Bag --     12/22/2022 0128 EST guaiFENesin (MUCINEX) 12 hr tablet 600 mg 600 mg Oral Given --     12/22/2022 0215 EST potassium chloride (K-DUR,KLOR-CON) CR tablet 40 mEq 40 mEq Oral Given --        Past Medical History:   Diagnosis Date   • GERD (gastroesophageal reflux disease)    • Hyperlipidemia    • Hypertension    • Spinal stenosis    • Trapezius muscle spasm     last assessed - [id number]     Present on Admission:  • Multifocal pneumonia      Admitting Diagnosis: Chest pain [R07 9]  Age/Sex: [de-identified] y o  female       Admission Orders:  Scheduled Medications:  azithromycin, 500 mg, Oral, Once - 12/22 x 2   benzonatate, 200 mg, Oral, TID  cefTRIAXone, 1,000 mg, Intravenous, Q24H  enoxaparin, 40 mg, Subcutaneous, Daily  guaiFENesin, 600 mg, Oral, Q12H ONEL  lidocaine, 1 patch, Topical, Once  losartan, 100 mg, Oral, Daily  pantoprazole, 40 mg, Oral, Daily      Continuous IV Infusions:     PRN Meds:  Tylenol 650 mg po q6 prn - 12/22 x1        Nursing orders - VS - up & OOB as tolerated - SCD's to le's - elevate HOB > 30 degrees - aspiration precautions - Diet reg house     Network Utilization Review Department  ATTENTION: Please call with any questions or concerns to [phone] and carefully listen to the prompts so that you are directed to the right person  All voicemails are confidential   Ayaan Livingston all requests for admission clinical reviews, approved or denied determinations and any other requests to dedicated fax number below belonging to the campus where the patient is receiving treatment   List of dedicated fax numbers for the Facilities:  [address] DENIALS (Administrative/Medical Necessity) [phone]   [address] (Maternity/NICU/Pediatrics) [address] [address] [facility] [address] [facility] [address] 812 [facility] [phone]

## 2022-12-22 NOTE — PROGRESS NOTES
Progress Note - General Surgery  : JUAN FRANCISCO Red Surgery Resident on Patrick Knowles [de-identified] y o  female MRN: [id number]  Unit/Bed#: BMWJA-47 Encounter: [id number]      Assessment:  [de-identified] y o  female with pneumonia, incidental GB distention without clinical concern for acute cholecystitis       Plan:  Low suspicion for cholecystitis  No indication for further imaging/workup, nor intervention for gallbladder  Regular diet as tolerated  Please call with questions      Subjective: No abdominal pain      Objective:     Physical Exam:  GEN: NAD   Ab: Soft, NT/ND  Lung: Normal effort   CV: RRR   Extrem: No CCE   Neuro: A+Ox3       I/O     None          Lab, Imaging and other studies: I have personally reviewed pertinent reports    , CBC with diff:   Lab Results   Component Value Date    WBC 21 32 (H) 12/21/2022    HGB 12 9 12/21/2022    HCT 38 7 12/21/2022    MCV 95 12/21/2022     (H) 12/22/2022    MCH 31 7 12/21/2022    MCHC 33 3 12/21/2022    RDW 13 2 12/21/2022    MPV 8 9 12/22/2022    NRBC 0 12/21/2022   , BMP/CMP:   Lab Results   Component Value Date    SODIUM 140 12/21/2022    K 3 2 (L) 12/21/2022     12/21/2022    CO2 26 12/21/2022    BUN 11 12/21/2022    CREATININE 0 54 (L) 12/21/2022    CALCIUM 8 9 12/21/2022    AST 14 12/21/2022    ALT 9 12/21/2022    ALKPHOS 102 12/21/2022    EGFR 89 12/21/2022           Rey Bean MD  12/22/2022 6:22 AM

## 2022-12-22 NOTE — PLAN OF CARE
Problem: PAIN - ADULT  Goal: Verbalizes/displays adequate comfort level or baseline comfort level  Description: Interventions:  - Encourage patient to monitor pain and request assistance  - Assess pain using appropriate pain scale  - Administer analgesics based on type and severity of pain and evaluate response  - Implement non-pharmacological measures as appropriate and evaluate response  - Consider cultural and social influences on pain and pain management  - Notify physician/advanced practitioner if interventions unsuccessful or patient reports new pain  Outcome: Progressing     Problem: INFECTION - ADULT  Goal: Absence or prevention of progression during hospitalization  Description: INTERVENTIONS:  - Assess and monitor for signs and symptoms of infection  - Monitor lab/diagnostic results  - Monitor all insertion sites, i e  indwelling lines, tubes, and drains  - Monitor endotracheal if appropriate and nasal secretions for changes in amount and color  - [place] appropriate cooling/warming therapies per order  - Administer medications as ordered  - Instruct and encourage patient and family to use good hand hygiene technique  - Identify and instruct in appropriate isolation precautions for identified infection/condition  Outcome: Progressing  Goal: Absence of fever/infection during neutropenic period  Description: INTERVENTIONS:  - Monitor WBC    Outcome: Progressing     Problem: SAFETY ADULT  Goal: Patient will remain free of falls  Description: INTERVENTIONS:  - Educate patient/family on patient safety including physical limitations  - Instruct patient to call for assistance with activity   - Consult OT/PT to assist with strengthening/mobility   - Keep Call bell within reach  - Keep bed low and locked with side rails adjusted as appropriate  - Keep care items and personal belongings within reach  - Initiate and maintain comfort rounds  - Make Fall Risk Sign visible to staff  - Offer Toileting every  Hours, in advance of need  - Initiate/Maintain alarm  - Obtain necessary fall risk management equipment:   - Apply yellow socks and bracelet for high fall risk patients  - Consider moving patient to room near nurses station  Outcome: Progressing  Goal: Maintain or return to baseline ADL function  Description: INTERVENTIONS:  -  Assess patient's ability to carry out ADLs; assess patient's baseline for ADL function and identify physical deficits which impact ability to perform ADLs (bathing, care of mouth/teeth, toileting, grooming, dressing, etc )  - Assess/evaluate cause of self-care deficits   - Assess range of motion  - Assess patient's mobility; develop plan if impaired  - Assess patient's need for assistive devices and provide as appropriate  - Encourage maximum independence but intervene and supervise when necessary  - Involve family in performance of ADLs  - Assess for home care needs following discharge   - Consider OT consult to assist with ADL evaluation and planning for discharge  - Provide patient education as appropriate  Outcome: Progressing  Goal: Maintains/Returns to pre admission functional level  Description: INTERVENTIONS:  - Perform BMAT or MOVE assessment daily    - Set and communicate daily mobility goal to care team and patient/family/caregiver  - Collaborate with rehabilitation services on mobility goals if consulted  - Perform Range of Motion  times a day  - Reposition patient every  hours    - Dangle patient  times a day  - Stand patient  times a day  - Ambulate patient  times a day  - Out of bed to chair  times a day   - Out of bed for meals times a day  - Out of bed for toileting  - Record patient progress and toleration of activity level   Outcome: Progressing     Problem: Knowledge Deficit  Goal: Patient/family/caregiver demonstrates understanding of disease process, treatment plan, medications, and discharge instructions  Description: Complete learning assessment and assess knowledge base   Interventions:  - Provide teaching at level of understanding  - Provide teaching via preferred learning methods  Outcome: Progressing     Problem: RESPIRATORY - ADULT  Goal: Achieves optimal ventilation and oxygenation  Description: INTERVENTIONS:  - Assess for changes in respiratory status  - Assess for changes in mentation and behavior  - Position to facilitate oxygenation and minimize respiratory effort  - Oxygen administered by appropriate delivery if ordered  - Initiate smoking cessation education as indicated  - Encourage broncho-pulmonary hygiene including cough, deep breathe, Incentive Spirometry  - Assess the need for suctioning and aspirate as needed  - Assess and instruct to report SOB or any respiratory difficulty  - Respiratory Therapy support as indicated  Outcome: Progressing     Problem: METABOLIC, FLUID AND ELECTROLYTES - ADULT  Goal: Electrolytes maintained within normal limits  Description: INTERVENTIONS:  - Monitor labs and assess patient for signs and symptoms of electrolyte imbalances  - Administer electrolyte replacement as ordered  - Monitor response to electrolyte replacements, including repeat lab results as appropriate  - Instruct patient on fluid and nutrition as appropriate  Outcome: Progressing  Goal: Fluid balance maintained  Description: INTERVENTIONS:  - Monitor labs   - Monitor I/O and WT  - Instruct patient on fluid and nutrition as appropriate  - Assess for signs & symptoms of volume excess or deficit  Outcome: Progressing  Goal: Glucose maintained within target range  Description: INTERVENTIONS:  - Monitor Blood Glucose as ordered  - Assess for signs and symptoms of hyperglycemia and hypoglycemia  - Administer ordered medications to maintain glucose within target range  - Assess nutritional intake and initiate nutrition service referral as needed  Outcome: Progressing

## 2022-12-22 NOTE — ASSESSMENT & PLAN NOTE
Left sided flank pain worsens with movement and with pressure for the past 4 days  · Point tenderness along rib  · UA: unremarkable, denies urinary symptoms  · CT Chest Abdomen Pelvis without hydronephrosis or urinary tract calculus  · Improved with lidocaine patch in the ED  Plan:  · Will continue lidocaine patch

## 2022-12-22 NOTE — ASSESSMENT & PLAN NOTE
History of HTN, home regimen of losartan  Hypertensive with a blood pressure 230/95, was given labetalol in the ED with improvement of pressures  Plan:  · Continue to monitor BP  · Will continue home meds

## 2022-12-22 NOTE — ED PROVIDER NOTES
History  Chief Complaint   Patient presents with   • Chest Pain     Pt with the flu for a couple weeks reports left upper side pain with movement or exertion/coughing     Patient is an [de-identified] YOF presenting to the ER for evaluation  Patient states on 12/9 she was diagnosed with the flu and was prescribed tamiflu  She said her symptoms have not went away and now she has left flank/back pain  Pain is worse with coughing and with movement  She has a productive cough  Denies any other symptoms at this time  History provided by:  Patient   used: No        Prior to Admission Medications   Prescriptions Last Dose Informant Patient Reported? Taking? Cholecalciferol (VITAMIN D) 2000 UNITS CAPS   Yes No   Sig: Take by mouth  Glycerin-Hypromellose- (DRY EYE RELIEF DROPS OP)   Yes No   Sig: Apply to eye   losartan (COZAAR) 100 MG tablet   No No   Sig: Take 1 tablet (100 mg total) by mouth daily   omeprazole (PriLOSEC) 40 MG capsule   No No   Sig: Take 1 capsule (40 mg total) by mouth daily      Facility-Administered Medications: None       Past Medical History:   Diagnosis Date   • GERD (gastroesophageal reflux disease)    • Hyperlipidemia    • Hypertension    • Spinal stenosis    • Trapezius muscle spasm     last assessed - [id number]       Past Surgical History:   Procedure Laterality Date   • BREAST CYST ASPIRATION Right 2010   • CATARACT EXTRACTION, BILATERAL     • DEBRIDEMENT TENNIS ELBOW     • ELBOW SURGERY Right     tennis elbow   • FASCIOTOMY ELBOW Right     of the lateral epicondyle; last assessed - 10Oct2014   • HYSTERECTOMY      WIN-BSO   • OOPHORECTOMY Bilateral    • NC COLONOSCOPY FLX DX W/COLLJ SPEC WHEN PFRMD N/A 05/01/2017    Procedure: EGD AND COLONOSCOPY;  Surgeon: Lisette Mott MD;  Location: [facility];   Service: Gastroenterology       Family History   Problem Relation Age of Onset   • Cancer Sister    • Colon cancer Sister 71   • Diabetes Sister    • Melanoma Mother    • Cirrhosis Father         alcoholic   • Stroke Father         CVA (cerebral vascular accident)   • Arrhythmia Father         pacemaker placement   • No Known Problems Maternal Grandmother    • No Known Problems Maternal Grandfather    • No Known Problems Paternal Grandmother    • No Known Problems Paternal Grandfather    • No Known Problems Sister    • No Known Problems Sister    • No Known Problems Sister    • No Known Problems Sister    • No Known Problems Maternal Aunt    • No Known Problems Maternal Aunt    • No Known Problems Maternal Aunt    • No Known Problems Maternal Aunt    • No Known Problems Maternal Aunt    • No Known Problems Maternal Aunt    • No Known Problems Paternal Aunt    • No Known Problems Paternal Aunt    • No Known Problems Paternal Aunt      I have reviewed and agree with the history as documented  E-Cigarette/Vaping   • E-Cigarette Use Never User      E-Cigarette/Vaping Substances   • Nicotine No    • THC No    • CBD No    • Flavoring No    • Other No    • Unknown No      Social History     Tobacco Use   • Smoking status: Former     Packs/day: 0 50     Types: Cigarettes     Start date: 1962   • Smokeless tobacco: Former   • Tobacco comments:     Quit June 2014 - 1/2 ppd for 40 years per [employer]   Vaping Use   • Vaping Use: Never used   Substance Use Topics   • Alcohol use: No     Comment: Consumes alcohol occasionally per [employer]   • Drug use: No       Review of Systems   Constitutional: Negative for chills and fever  HENT: Negative for ear pain and sore throat  Eyes: Negative for pain and visual disturbance  Respiratory: Positive for cough  Negative for shortness of breath  Cardiovascular: Negative for chest pain and palpitations  Gastrointestinal: Negative for abdominal pain and vomiting  Genitourinary: Negative for dysuria and hematuria  Musculoskeletal: Positive for arthralgias  Negative for back pain  Skin: Negative for color change and rash     Neurological: Negative for seizures and syncope  All other systems reviewed and are negative  Physical Exam  Physical Exam  Vitals and nursing note reviewed  Constitutional:       General: She is not in acute distress  Appearance: She is well-developed  HENT:      Head: Normocephalic and atraumatic  Eyes:      Conjunctiva/sclera: Conjunctivae normal    Cardiovascular:      Rate and Rhythm: Normal rate and regular rhythm  Heart sounds: No murmur heard  Pulmonary:      Effort: Pulmonary effort is normal  No respiratory distress  Breath sounds: Examination of the right-lower field reveals wheezing and rhonchi  Examination of the left-lower field reveals wheezing and rhonchi  Wheezing and rhonchi present  Chest:      Chest wall: Tenderness (left ribs) present  Abdominal:      Palpations: Abdomen is soft  Tenderness: There is no abdominal tenderness  Musculoskeletal:         General: No swelling  Cervical back: Neck supple  Skin:     General: Skin is warm and dry  Capillary Refill: Capillary refill takes less than 2 seconds  Comments: No rash   Neurological:      Mental Status: She is alert     Psychiatric:         Mood and Affect: Mood normal          Vital Signs  ED Triage Vitals   Temperature Pulse Respirations Blood Pressure SpO2   12/21/22 1945 12/21/22 1945 12/21/22 1945 12/21/22 1948 12/21/22 1945   99 1 °F (37 3 °C) 83 18 (!) 230/95 95 %      Temp Source Heart Rate Source Patient Position - Orthostatic VS BP Location FiO2 (%)   12/21/22 1945 12/21/22 2300 12/22/22 0120 12/21/22 2300 --   Oral Monitor Lying Left arm       Pain Score       12/21/22 2051       6           Vitals:    12/21/22 2057 12/21/22 2103 12/21/22 2300 12/22/22 0120   BP: (!) 204/83 166/75 166/72 158/70   Pulse: 86 75 81 73   Patient Position - Orthostatic VS:    Lying         ED Medications  Medications   lidocaine (LIDODERM) 5 % patch 1 patch (1 patch Topical Medication Applied 12/21/22 2053) losartan (COZAAR) tablet 100 mg (has no administration in time range)   pantoprazole (PROTONIX) EC tablet 40 mg (has no administration in time range)   enoxaparin (LOVENOX) subcutaneous injection 40 mg (has no administration in time range)   guaiFENesin (MUCINEX) 12 hr tablet 600 mg (600 mg Oral Given 12/22/22 0128)   benzonatate (TESSALON PERLES) capsule 200 mg (has no administration in time range)   azithromycin (ZITHROMAX) 500 mg in sodium chloride 0 9% 250mL IVPB 500 mg (has no administration in time range)   ceftriaxone (ROCEPHIN) 1 g/50 mL in dextrose IVPB (has no administration in time range)   acetaminophen (TYLENOL) tablet 650 mg (650 mg Oral Given 12/21/22 2051)   labetalol (NORMODYNE) injection 10 mg (10 mg Intravenous Given 12/21/22 2053)   iohexol (OMNIPAQUE) 350 MG/ML injection (SINGLE-DOSE) 100 mL (100 mL Intravenous Given 12/21/22 2149)   ceftriaxone (ROCEPHIN) 1 g/50 mL in dextrose IVPB (1,000 mg Intravenous New Bag 12/21/22 2345)   azithromycin (ZITHROMAX) 500 mg in sodium chloride 0 9% 250mL IVPB 500 mg (500 mg Intravenous New Bag 12/22/22 0058)   potassium chloride (K-DUR,KLOR-CON) CR tablet 40 mEq (40 mEq Oral Given 12/22/22 0215)       Diagnostic Studies  Results Reviewed     Procedure Component Value Units Date/Time    Strep Pneumoniae, Urine [[id number]] Collected: 12/22/22 0495    Lab Status: In process Specimen: Urine, Other Updated: 12/22/22 0220    HS Troponin I 4hr [[id number]]  (Normal) Collected: 12/22/22 0126    Lab Status: Final result Specimen: Blood from Arm, Left Updated: 12/22/22 0209     hs TnI 4hr 13 ng/L      Delta 4hr hsTnI -1 ng/L     Platelet count [[id number]]  (Abnormal) Collected: 12/22/22 0126    Lab Status: Final result Specimen: Blood from Arm, Left Updated: 12/22/22 0140     Platelets 891 Thousands/uL      MPV 8 9 fL     MRSA culture [[id number]] Collected: 12/22/22 0126    Lab Status:  In process Specimen: Nares from Nose Updated: 12/22/22 0131    Blood culture #1 [[id number]] Collected: 12/21/22 2103    Lab Status: Preliminary result Specimen: Blood from Arm, Right Updated: 12/22/22 0101     Blood Culture Received in Microbiology Lab  Culture in Progress  Blood culture #2 [[id number]] Collected: 12/21/22 2103    Lab Status: Preliminary result Specimen: Blood from Arm, Right Updated: 12/22/22 0101     Blood Culture Received in Microbiology Lab  Culture in Progress  HS Troponin I 2hr [[id number]]  (Normal) Collected: 12/21/22 2245    Lab Status: Final result Specimen: Blood from Arm, Right Updated: 12/21/22 2335     hs TnI 2hr 16 ng/L      Delta 2hr hsTnI 2 ng/L     Urine Microscopic [[id number]]  (Normal) Collected: 12/21/22 2245    Lab Status: Final result Specimen: Urine, Clean Catch Updated: 12/21/22 2309     RBC, UA 1-2 /hpf      WBC, UA None Seen /hpf      Epithelial Cells None Seen /hpf      Bacteria, UA None Seen /hpf     UA w Reflex to Microscopic w Reflex to Culture [[id number]]  (Abnormal) Collected: 12/21/22 2245    Lab Status: Final result Specimen: Urine, Clean Catch Updated: 12/21/22 2308     Color, UA Light Yellow     Clarity, UA Clear     Specific Gravity, UA 1 013     pH, UA 7 0     Leukocytes, UA Negative     Nitrite, UA Negative     Protein, UA Trace mg/dl      Glucose, UA Negative mg/dl      Ketones, UA 10 (1+) mg/dl      Urobilinogen, UA <2 0 mg/dl      Bilirubin, UA Negative     Occult Blood, UA Negative    FLU/RSV/COVID - if FLU/RSV clinically relevant [[id number]]  (Normal) Collected: 12/21/22 2103    Lab Status: Final result Specimen: Nares from Nose Updated: 12/21/22 2152     SARS-CoV-2 Negative     INFLUENZA A PCR Negative     INFLUENZA B PCR Negative     RSV PCR Negative    Narrative:      FOR PEDIATRIC PATIENTS - copy/paste COVID Guidelines URL to browser: [email or web address]    SARS-CoV-2 assay is a Nucleic Acid Amplification assay intended for the  qualitative detection of nucleic acid from SARS-CoV-2 in nasopharyngeal  swabs  Results are for the presumptive identification of SARS-CoV-2 RNA  Positive results are indicative of infection with SARS-CoV-2, the virus  causing COVID-19, but do not rule out bacterial infection or co-infection  with other viruses  Laboratories within the [place] and its  territories are required to report all positive results to the appropriate  public health authorities  Negative results do not preclude SARS-CoV-2  infection and should not be used as the sole basis for treatment or other  patient management decisions  Negative results must be combined with  clinical observations, patient history, and epidemiological information  This test has not been FDA cleared or approved  This test has been authorized by FDA under an Emergency Use Authorization  (EUA)  This test is only authorized for the duration of time the  declaration that circumstances exist justifying the authorization of the  emergency use of an in vitro diagnostic tests for detection of SARS-CoV-2  virus and/or diagnosis of COVID-19 infection under section 564(b)(1) of  the Act, 21 U  [place] C  275HTZ-1(A)(8), unless the authorization is terminated  or revoked sooner  The test has been validated but independent review by FDA  and CLIA is pending  Test performed using [employer] Gene[employer]pert: This RT-PCR assay targets N2,  a region unique to SARS-CoV-2  A conserved region in the E-gene was chosen  for pan-Sarbecovirus detection which includes SARS-CoV-2  According to CMS-2020-01-R, this platform meets the definition of high-throughput technology      Procalcitonin [[id number]]  (Normal) Collected: 12/21/22 1953    Lab Status: Final result Specimen: Blood from Arm, Right Updated: 12/21/22 2133     Procalcitonin 0 09 ng/ml     Lactic acid [[id number]]  (Normal) Collected: 12/21/22 2103    Lab Status: Final result Specimen: Blood from Arm, Right Updated: 12/21/22 2127     LACTIC ACID 0 9 mmol/L     Narrative: Result may be elevated if tourniquet was used during collection  D-dimer, quantitative [[id number]]  (Abnormal) Collected: 12/21/22 1953    Lab Status: Final result Specimen: Blood from Arm, Right Updated: 12/21/22 2105     D-Dimer, Quant 0 51 ug/ml FEU     Narrative: In the evaluation for possible pulmonary embolism, in the appropriate (Well's Score of 4 or less) patient, the age adjusted d-dimer cutoff for this patient can be calculated as:    Age x 0 01 (in ug/mL) for Age-adjusted D-dimer exclusion threshold for a patient over 50 years      Protime-INR [[id number]]  (Normal) Collected: 12/21/22 1953    Lab Status: Final result Specimen: Blood from Arm, Right Updated: 12/21/22 2052     Protime 13 5 seconds      INR 1 01    APTT [[id number]]  (Normal) Collected: 12/21/22 1953    Lab Status: Final result Specimen: Blood from Arm, Right Updated: 12/21/22 2052     PTT 31 seconds     Comprehensive metabolic panel [[id number]]  (Abnormal) Collected: 12/21/22 1953    Lab Status: Final result Specimen: Blood from Arm, Right Updated: 12/21/22 2028     Sodium 140 mmol/L      Potassium 3 2 mmol/L      Chloride 104 mmol/L      CO2 26 mmol/L      ANION GAP 10 mmol/L      BUN 11 mg/dL      Creatinine 0 54 mg/dL      Glucose 112 mg/dL      Calcium 8 9 mg/dL      AST 14 U/L      ALT 9 U/L      Alkaline Phosphatase 102 U/L      Total Protein 7 0 g/dL      Albumin 3 6 g/dL      Total Bilirubin 0 63 mg/dL      eGFR 89 ml/min/1 73sq m     Narrative:      [facility] guidelines for Chronic Kidney Disease (CKD):   •  Stage 1 with normal or high GFR (GFR > 90 mL/min/1 73 square meters)  •  Stage 2 Mild CKD (GFR = 60-89 mL/min/1 73 square meters)  •  Stage 3A Moderate CKD (GFR = 45-59 mL/min/1 73 square meters)  •  Stage 3B Moderate CKD (GFR = 30-44 mL/min/1 73 square meters)  •  Stage 4 Severe CKD (GFR = 15-29 mL/min/1 73 square meters)  •  Stage 5 End Stage CKD (GFR <15 mL/min/1 73 square meters)  Note: GFR calculation is accurate only with a steady state creatinine    HS Troponin 0hr (reflex protocol) [[id number]]  (Normal) Collected: 12/21/22 1953    Lab Status: Final result Specimen: Blood from Arm, Right Updated: 12/21/22 2022     hs TnI 0hr 14 ng/L     CBC and differential [[id number]]  (Abnormal) Collected: 12/21/22 1953    Lab Status: Final result Specimen: Blood from Arm, Right Updated: 12/21/22 2003     WBC 21 32 Thousand/uL      RBC 4 07 Million/uL      Hemoglobin 12 9 g/dL      Hematocrit 38 7 %      MCV 95 fL      MCH 31 7 pg      MCHC 33 3 g/dL      RDW 13 2 %      MPV 8 8 fL      Platelets 865 Thousands/uL      nRBC 0 /100 WBCs      Neutrophils Relative 88 %      Immat GRANS % 1 %      Lymphocytes Relative 4 %      Monocytes Relative 5 %      Eosinophils Relative 2 %      Basophils Relative 0 %      Neutrophils Absolute 19 09 Thousands/µL      Immature Grans Absolute 0 12 Thousand/uL      Lymphocytes Absolute 0 77 Thousands/µL      Monocytes Absolute 0 97 Thousand/µL      Eosinophils Absolute 0 31 Thousand/µL      Basophils Absolute 0 06 Thousands/µL                  CT chest abdomen pelvis w contrast   Final Result by Lev Quispe DO (12/21 2316)      Scattered airspace opacities in the lower lobes which may represent infection  Recommend short-term follow-up chest CT scan in 3 months evaluate for resolution  Distended gallbladder with gallstones  Further evaluation with right upper quadrant sonogram is recommended to rule out acute cholecystitis  The study was marked in [facility] for immediate notification              Workstation performed: [id number]         XR chest 1 view portable    (Results Pending)              Procedures  ECG 12 Lead Documentation Only    Date/Time: 12/21/2022 7:49 PM  Performed by: Tripp Wade PA-C  Authorized by: Tripp Wade PA-C     ECG reviewed by me, the ED Provider: yes    Patient location:  ED  Rate:     ECG rate:  84    ECG rate assessment: normal Rhythm:     Rhythm: sinus rhythm    Ectopy:     Ectopy: none    QRS:     QRS axis:  Normal  Conduction:     Conduction: normal    ST segments:     ST segments:  Normal  T waves:     T waves: normal               ED Course  ED Course as of 12/22/22 0323   Wed Dec 21, 2022   2106 D-Dimer, Quant(!): 0 51  Age adjusted dimer normal                   MDM  Number of Diagnoses or Management Options  Biliary calculus of other site without obstruction: new and requires workup  Pneumonia of both lower lobes due to infectious organism: new and requires workup     Amount and/or Complexity of Data Reviewed  Clinical lab tests: reviewed and ordered  Tests in the radiology section of CPT®: ordered and reviewed    Risk of Complications, Morbidity, and/or Mortality  Presenting problems: high  Diagnostic procedures: high  Management options: high        Disposition  Final diagnoses:   Pneumonia of both lower lobes due to infectious organism   Biliary calculus of other site without obstruction     Time reflects when diagnosis was documented in both MDM as applicable and the Disposition within this note     Time User Action Codes Description Comment    12/21/2022 11:26 PM Hilario Fowler Add [J18 9] Pneumonia of both lower lobes due to infectious organism     12/21/2022 11:39 PM Hilario Fowler Add [K80 80] Biliary calculus of other site without obstruction       ED Disposition     ED Disposition   Admit    Condition   Stable    Date/Time   Wed Dec 21, 2022 11:39 PM    Comment   Case was discussed with SANDRINE and the patient's admission status was agreed to be Admission Status: inpatient status to the service of Dr Lit Webb   Follow-up Information    None         Patient's Medications   Discharge Prescriptions    No medications on file       No discharge procedures on file      PDMP Review     None          ED Provider  Electronically Signed by           Bernarda Clark PA-C  12/22/22 1580

## 2022-12-22 NOTE — CONSULTS
Consultation - General Surgery  : RA Surgery Resident role on [employer]  Josephine Garcia [de-identified] y o  female MRN: [id number]  Unit/Bed#: ED-07 Encounter: [id number]        Assessment:  [de-identified]y o  year old female with multifocal pneumonia in the setting of influenza, cough, L flank pain (favor costochondritis)  Gen surg consult for incidental GB distention with cholelithiasis    Low clinical suspicion for acute cholecystitis or biliary colic    Plan:  No indication for surgical / procedural intervention for gallbladder  No need for further gallbladder imaging  OK for regular diet as tolerated from a general surgery standpoint  Please do not hesitate to call with questions    HPI:  Josephine Garcia is a [de-identified] y o  female with a history of GERD on PPI, HTN on ARB, TLH/BSO  Good functional status, can walk up two flights of stairs easily  Surgical history of TLH/BSO, last EGD / C-scope 2017, notable for chronic gastritis, one rectal polyp, mild sigmoid diverticulosis    Patient was diagnosed with the flu over the weekend and has had a persistent cough  She noticed L flank pain starting Sunday which she feels was brought on by coughing  Denies abdominal pain, nausea, emesis, change in bowel habits  Specifically denies any history of post-prandial abdominal pain, has had decreased appetite for a few days but tolerated a grilled cheese for dinner with no pain  Last BM this morning (morning of Wednesday 12/21), non-bloody  Denies fevers/chills  Denies dysuria/frequency, denies history of nephrolithiasis  Physical Exam  Constitutional:       General: She is not in acute distress  Appearance: Normal appearance  HENT:      Head: Normocephalic and atraumatic  Right Ear: External ear normal       Left Ear: External ear normal       Nose: Nose normal       Mouth/Throat:      Mouth: Mucous membranes are moist       Pharynx: Oropharynx is clear  Eyes:      General:         Right eye: No discharge           Left eye: No discharge  Extraocular Movements: Extraocular movements intact  Conjunctiva/sclera: Conjunctivae normal       Pupils: Pupils are equal, round, and reactive to light  Cardiovascular:      Rate and Rhythm: Normal rate  Pulses: Normal pulses  Heart sounds: Normal heart sounds  Pulmonary:      Effort: Pulmonary effort is normal  No respiratory distress  Abdominal:      General: Abdomen is flat  There is no distension  Palpations: Abdomen is soft  Tenderness: There is no abdominal tenderness  There is left CVA tenderness (Focal point tenderness along a rib, approximately mid-axillary line)  There is no guarding or rebound  Comments: Does not have Yoder's sign   Musculoskeletal:         General: No swelling, tenderness or signs of injury  Cervical back: Normal range of motion and neck supple  No rigidity or tenderness  Skin:     Coloration: Skin is not jaundiced  Findings: No lesion  Neurological:      General: No focal deficit present  Mental Status: She is alert and oriented to person, place, and time  Mental status is at baseline  Psychiatric:         Mood and Affect: Mood normal          Behavior: Behavior normal             Review of Systems   Constitutional: Negative for activity change, appetite change, chills and fever  HENT: Negative  Negative for congestion, dental problem, ear pain, facial swelling, nosebleeds, sore throat and trouble swallowing  Eyes: Negative  Negative for pain and visual disturbance  Respiratory: Positive for cough  Negative for apnea and shortness of breath  Cardiovascular: Negative  Negative for chest pain and palpitations  Gastrointestinal: Negative  Negative for abdominal pain, anal bleeding, blood in stool, nausea and vomiting  Endocrine: Negative  Negative for cold intolerance and heat intolerance  Genitourinary: Positive for flank pain (L)  Negative for dysuria and hematuria     Musculoskeletal: Negative for gait problem and joint swelling  Skin: Negative  Negative for color change and wound  Allergic/Immunologic: Negative  Neurological: Negative  Negative for dizziness, seizures, light-headedness and numbness  Hematological: Negative  Psychiatric/Behavioral: Negative  Negative for behavioral problems and hallucinations  Objective       No intake or output data in the 24 hours ending 12/21/22 2353    First Vitals:   Blood Pressure: (!) 230/95 (12/21/22 1948)  Pulse: 83 (12/21/22 1945)  Temperature: 99 1 °F (37 3 °C) (12/21/22 1945)  Temp Source: Oral (12/21/22 1945)  Respirations: 18 (12/21/22 1945)  Weight - Scale: 56 7 kg (125 lb) (12/21/22 1945)  SpO2: 95 % (12/21/22 1945)    Current Vitals:   Blood Pressure: 166/72 (12/21/22 2300)  Pulse: 81 (12/21/22 2300)  Temperature: 99 1 °F (37 3 °C) (12/21/22 1945)  Temp Source: Oral (12/21/22 1945)  Respirations: 18 (12/21/22 2300)  Weight - Scale: 56 7 kg (125 lb) (12/21/22 1945)  SpO2: 93 % (12/21/22 2300)    Invasive Devices     Peripheral Intravenous Line  Duration           Peripheral IV 12/21/22 Right Antecubital <1 day                Imaging: I have personally reviewed pertinent reports  CT chest abdomen pelvis w contrast    Result Date: 12/21/2022  Impression: Scattered airspace opacities in the lower lobes which may represent infection  Recommend short-term follow-up chest CT scan in 3 months evaluate for resolution  Distended gallbladder with gallstones  Further evaluation with right upper quadrant sonogram is recommended to rule out acute cholecystitis  The study was marked in [facility] for immediate notification  Workstation performed: [id number]       EKG, Pathology, and Other Studies: I have personally reviewed pertinent reports      Historical Information   Past Medical History:   Diagnosis Date   • GERD (gastroesophageal reflux disease)    • Hyperlipidemia    • Hypertension    • Spinal stenosis    • Trapezius muscle spasm last assessed - [id number]     Past Surgical History:   Procedure Laterality Date   • BREAST CYST ASPIRATION Right 2010   • CATARACT EXTRACTION, BILATERAL     • DEBRIDEMENT TENNIS ELBOW     • ELBOW SURGERY Right     tennis elbow   • FASCIOTOMY ELBOW Right     of the lateral epicondyle; last assessed - 10Oct2014   • HYSTERECTOMY      WIN-BSO   • OOPHORECTOMY Bilateral    • MD COLONOSCOPY FLX DX W/COLLJ SPEC WHEN PFRMD N/A 05/01/2017    Procedure: EGD AND COLONOSCOPY;  Surgeon: Jasper Cervantes MD;  [facility]: [facility];   Service: Gastroenterology     Social History   Social History     Substance and Sexual Activity   Alcohol Use No    Comment: Consumes alcohol occasionally per [employer]     Social History     Substance and Sexual Activity   Drug Use No     Social History     Tobacco Use   Smoking Status Former   • Packs/day: 0 50   • Types: Cigarettes   • Start date: 1962   Smokeless Tobacco Former   Tobacco Comments    Quit June 2014 - 1/2 ppd for 40 years per [employer]     Family History   Problem Relation Age of Onset   • Cancer Sister    • Colon cancer Sister 71   • Diabetes Sister    • Melanoma Mother    • Cirrhosis Father         alcoholic   • Stroke Father         CVA (cerebral vascular accident)   • Arrhythmia Father         pacemaker placement   • No Known Problems Maternal Grandmother    • No Known Problems Maternal Grandfather    • No Known Problems Paternal Grandmother    • No Known Problems Paternal Grandfather    • No Known Problems Sister    • No Known Problems Sister    • No Known Problems Sister    • No Known Problems Sister    • No Known Problems Maternal Aunt    • No Known Problems Maternal Aunt    • No Known Problems Maternal Aunt    • No Known Problems Maternal Aunt    • No Known Problems Maternal Aunt    • No Known Problems Maternal Aunt    • No Known Problems Paternal Aunt    • No Known Problems Paternal Aunt    • No Known Problems Paternal Aunt        Meds/Allergies   all current active meds have been reviewed, current meds:   Current Facility-Administered Medications   Medication Dose Route Frequency   • azithromycin (ZITHROMAX) 500 mg in sodium chloride 0 9% 250mL IVPB 500 mg  500 mg Intravenous Once   • ceftriaxone (ROCEPHIN) 1 g/50 mL in dextrose IVPB  1,000 mg Intravenous Once   • lidocaine (LIDODERM) 5 % patch 1 patch  1 patch Topical Once    and PTA meds:   Prior to Admission Medications   Prescriptions Last Dose Informant Patient Reported? Taking? Cholecalciferol (VITAMIN D) 2000 UNITS CAPS   Yes No   Sig: Take by mouth  Glycerin-Hypromellose- (DRY EYE RELIEF DROPS OP)   Yes No   Sig: Apply to eye   losartan (COZAAR) 100 MG tablet   No No   Sig: Take 1 tablet (100 mg total) by mouth daily   omeprazole (PriLOSEC) 40 MG capsule   No No   Sig: Take 1 capsule (40 mg total) by mouth daily      Facility-Administered Medications: None     Allergies   Allergen Reactions   • Atorvastatin Edema   • Other      Cholesterol med- name unknown and pneumonia vaccine   • Pneumococcal Vaccines Edema       Lab Results: I have personally reviewed pertinent lab results  , CBC:   Lab Results   Component Value Date    WBC 21 32 (H) 12/21/2022    HGB 12 9 12/21/2022    HCT 38 7 12/21/2022    MCV 95 12/21/2022     (H) 12/21/2022    MCH 31 7 12/21/2022    MCHC 33 3 12/21/2022    RDW 13 2 12/21/2022    MPV 8 8 (L) 12/21/2022    NRBC 0 12/21/2022   , CMP:   Lab Results   Component Value Date    SODIUM 140 12/21/2022    K 3 2 (L) 12/21/2022     12/21/2022    CO2 26 12/21/2022    BUN 11 12/21/2022    CREATININE 0 54 (L) 12/21/2022    CALCIUM 8 9 12/21/2022    AST 14 12/21/2022    ALT 9 12/21/2022    ALKPHOS 102 12/21/2022    EGFR 89 12/21/2022       Counseling / Coordination of Care  Total floor / unit time spent today 25 minutes  Greater than 50% of total time was spent with the patient and / or family counseling and / or coordination of care      Carina Chavez MD  12/21/2022 11:53 PM

## 2022-12-22 NOTE — ASSESSMENT & PLAN NOTE
Patient presenting with ongoing cough for the past 2 weeks  · Initially had flu-like sxs and was seen by her PCP  Influenza negative, but treated with tamiflu given  tested positive    · Cough initially improved but states worsening of productive cough over past week  · In the ED patient was found to have leukocytosis of 21 32  · FLU/RSV/COVID negative, Procalcitonin 0 09, Lactic Acid 0 9 D Dimer 0 51  · CT Chest Abdomen Pelvis with contrast performed revealing scattered airspace opacities in the lower lobes    Plan:  · Will continue antibiotic therapy of Ceftriaxone and Azithromycin, if leukocytosis does not trend downward on AM labs consider adding vancomycin  · Follow up blood cultures  · Follow up am procal  · MRSA Culture, Urine Strep Pneumoniae, pending  · Respiratory Protocol, Aspiration precautrions, Incentive Spirometry  · Tessalon Pearls and Guaifenesin for cough

## 2022-12-22 NOTE — H&P
[address] [facility] [address] 1940, [de-identified] y o  female MRN: [id number]  Unit/Bed#: FHMTI-13 Encounter: [id number]  Primary Care Provider: IRAM Peña   Date and time admitted to hospital: 12/21/2022  7:37 PM    * Multifocal pneumonia  Assessment & Plan  Patient presenting with ongoing cough for the past 2 weeks  · Initially had flu-like sxs and was seen by her PCP  Influenza negative, but treated with tamiflu given  tested positive  · Cough initially improved but states worsening of productive cough over past week  · In the ED patient was found to have leukocytosis of 21 32  · FLU/RSV/COVID negative, Procalcitonin 0 09, Lactic Acid 0 9 D Dimer 0 51  · CT Chest Abdomen Pelvis with contrast performed revealing scattered airspace opacities in the lower lobes    Plan:  · Will continue antibiotic therapy of Ceftriaxone and Azithromycin, if leukocytosis does not trend downward on AM labs consider adding vancomycin  · Follow up blood cultures  · Follow up am procal  · MRSA Culture, Urine Strep Pneumoniae, pending  · Respiratory Protocol, Aspiration precautrions, Incentive Spirometry  · Tessalon Pearls and Guaifenesin for cough    Flank pain  Assessment & Plan  Left sided flank pain worsens with movement and with pressure for the past 4 days  · Point tenderness along rib  · UA: unremarkable, denies urinary symptoms  · CT Chest Abdomen Pelvis without hydronephrosis or urinary tract calculus  · Improved with lidocaine patch in the ED  Plan:  · Will continue lidocaine patch    Hypokalemia  Assessment & Plan  POA with K of 3 2  Plan:  · Potassium replenishment    Abnormal CT scan, gallbladder  Assessment & Plan  CT Chest Abdomen Pelvis: Distended gallbladder with gallstones    Further evaluation with right upper quadrant sonogram was recommended to rule out acute cholecystitis  · Normal AST/ALT Alk Phos  · Denies RUQ abdominal pain, negative mendoza's  · Was evaluated by General Surgery in the ED who reported that there was no current indications for surgical/procedural interventions at this time, and no need for further GB imaging    Hypertension  Assessment & Plan  History of HTN, home regimen of losartan  Hypertensive with a blood pressure 230/95, was given labetalol in the ED with improvement of pressures  Plan:  · Continue to monitor BP  · Will continue home meds        VTE Pharmacologic Prophylaxis: VTE Score: 3 Moderate Risk (Score 3-4) - Pharmacological DVT Prophylaxis Ordered: enoxaparin (Lovenox)  Code Status: Level 1 - Full Code - discussed with patient  Discussion with family: Patient declined call to   Anticipated Length of Stay: Patient will be admitted on an inpatient basis with an anticipated length of stay of greater than 2 midnights secondary to multilobar pneumonia  Chief Complaint: Cough  History of Present Illness:  Mackenzie Page is a [de-identified] y o  female with a PMH of hypertension, hyperlipidemia, GERD who presents with chief complaints of cough  Patient reports she developed flu like symptoms 3 days after her  tested positive for influenza  Patient reports she nonproductive cough, sore throat, body aches, rhinorrhea, congestion, fatigue, and diarrhea  She was seen by per PCP on 12/9/22 tested negative for influenza but was treated with tamiflu for presumptive influenza  Patient reports her symptoms initially improved reporting resolution of all symptoms except her cough  Patient notes worsening cough over the last week and states cough became productive with green sputum  Pt states she began developing left sided flank pain 4 days ago  Pt describes pain as dull 6/10 at its worse, 4/10 at its best  Pain worsens with movement and pressure  Denies radiation  Patient reports normal appetite and regular po intake   Denies urinary symptoms, abdominal pain, nausea, vomiting, diarrhea, muscle aches, fevers, chills, headache, shortness of breath, chest pain, lower extremity swelling, or any other associated symptoms at this time  Review of Systems:  Review of Systems   Constitutional: Negative for appetite change, chills, diaphoresis, fatigue and fever  HENT: Negative for congestion, rhinorrhea and sore throat  Eyes: Negative for photophobia and visual disturbance  Respiratory: Positive for cough  Negative for shortness of breath  Cardiovascular: Negative for chest pain, palpitations and leg swelling  Gastrointestinal: Negative for abdominal pain, constipation, diarrhea, nausea and vomiting  Genitourinary: Positive for flank pain  Negative for decreased urine volume, difficulty urinating, dyspareunia, dysuria, hematuria and urgency  Musculoskeletal: Negative for arthralgias and myalgias  Skin: Negative for rash  Neurological: Negative for dizziness, syncope, weakness and headaches  Psychiatric/Behavioral: Negative for agitation and behavioral problems  All other systems reviewed and are negative  Past Medical and Surgical History:   Past Medical History:   Diagnosis Date   • GERD (gastroesophageal reflux disease)    • Hyperlipidemia    • Hypertension    • Spinal stenosis    • Trapezius muscle spasm     last assessed - [id number]       Past Surgical History:   Procedure Laterality Date   • BREAST CYST ASPIRATION Right 2010   • CATARACT EXTRACTION, BILATERAL     • DEBRIDEMENT TENNIS ELBOW     • ELBOW SURGERY Right     tennis elbow   • FASCIOTOMY ELBOW Right     of the lateral epicondyle; last assessed - 10Oct2014   • HYSTERECTOMY      WIN-BSO   • OOPHORECTOMY Bilateral    • [place] COLONOSCOPY FLX DX W/COLLJ SPEC WHEN PFRMD N/A 05/01/2017    Procedure: EGD AND COLONOSCOPY;  Surgeon: Izabela Santamaria MD;  Location: [facility]; Service: Gastroenterology       Meds/Allergies:  Prior to Admission medications    Medication Sig Start Date End Date Taking? Authorizing Provider   Cholecalciferol (VITAMIN D) 2000 UNITS CAPS Take by mouth  Historical Provider, MD   Glycerin-Hypromellose- (DRY EYE RELIEF DROPS OP) Apply to eye    Historical Provider, MD   losartan (COZAAR) 100 MG tablet Take 1 tablet (100 mg total) by mouth daily 8/9/22   IRAM Roque   omeprazole (PriLOSEC) 40 MG capsule Take 1 capsule (40 mg total) by mouth daily 8/9/22   IRAM Roque     I have reviewed home medications with patient personally  Allergies:    Allergies   Allergen Reactions   • Atorvastatin Edema   • Other      Cholesterol med- name unknown and pneumonia vaccine   • Pneumococcal Vaccines Edema       Social History:  Marital Status: /Civil Union   Occupation: Retired  Patient Pre-hospital Living Situation: Home  Patient Pre-hospital Level of Mobility: walks  Patient Pre-hospital Diet Restrictions: None  Substance Use History:   Social History     Substance and Sexual Activity   Alcohol Use No    Comment: Consumes alcohol occasionally per [employer]     Social History     Tobacco Use   Smoking Status Former   • Packs/day: 0 50   • Types: Cigarettes   • Start date: 1962   Smokeless Tobacco Former   Tobacco Comments    Quit June 2014 - 1/2 ppd for 40 years per [employer]     Social History     Substance and Sexual Activity   Drug Use No       Family History:  Family History   Problem Relation Age of Onset   • Cancer Sister    • Colon cancer Sister 71   • Diabetes Sister    • Melanoma Mother    • Cirrhosis Father         alcoholic   • Stroke Father         CVA (cerebral vascular accident)   • Arrhythmia Father         pacemaker placement   • No Known Problems Maternal Grandmother    • No Known Problems Maternal Grandfather    • No Known Problems Paternal Grandmother    • No Known Problems Paternal Grandfather    • No Known Problems Sister    • No Known Problems Sister    • No Known Problems Sister    • No Known Problems Sister    • No Known Problems Maternal Aunt    • No Known Problems Maternal Aunt    • No Known Problems Maternal Aunt    • No Known Problems Maternal Aunt    • No Known Problems Maternal Aunt    • No Known Problems Maternal Aunt    • No Known Problems Paternal Aunt    • No Known Problems Paternal Aunt    • No Known Problems Paternal Aunt        Physical Exam:     Vitals:   Blood Pressure: 158/70 (12/22/22 0120)  Pulse: 73 (12/22/22 0120)  Temperature: 98 2 °F (36 8 °C) (12/22/22 0120)  Temp Source: Oral (12/22/22 0120)  Respirations: 19 (12/22/22 0120)  Weight - Scale: 56 7 kg (125 lb) (12/21/22 1945)  SpO2: 94 % (12/22/22 0120)    Physical Exam  Vitals reviewed  Constitutional:       General: She is awake  Appearance: She is not ill-appearing or toxic-appearing  HENT:      Head: Normocephalic and atraumatic  Nose: No congestion or rhinorrhea  Mouth/Throat:      Mouth: Mucous membranes are moist       Pharynx: No posterior oropharyngeal erythema  Eyes:      Extraocular Movements: Extraocular movements intact  Conjunctiva/sclera: Conjunctivae normal    Cardiovascular:      Rate and Rhythm: Normal rate and regular rhythm  Pulses: Normal pulses  Heart sounds: Normal heart sounds, S1 normal and S2 normal    Pulmonary:      Effort: Pulmonary effort is normal  No accessory muscle usage or respiratory distress  Breath sounds: Rhonchi (bibasilar) present  Abdominal:      General: Bowel sounds are normal  There is no distension  Palpations: Abdomen is soft  Tenderness: There is no abdominal tenderness  There is left CVA tenderness (point tenderness along the rib, pain reproducible with palpation)  Musculoskeletal:         General: No swelling  Normal range of motion  Cervical back: Normal range of motion  Right lower leg: No edema  Left lower leg: No edema  Skin:     General: Skin is warm  Coloration: Skin is not jaundiced or pale  Findings: No rash  Neurological:      General: No focal deficit present        Mental Status: She is alert and oriented to person, place, and time  Psychiatric:         Mood and Affect: Mood normal          Behavior: Behavior normal  Behavior is cooperative  Additional Data:     Lab Results:  Results from last 7 days   Lab Units 12/22/22  0126 12/21/22 1953   WBC Thousand/uL  --  21 32*   HEMOGLOBIN g/dL  --  12 9   HEMATOCRIT %  --  38 7   PLATELETS Thousands/uL 488* 542*   NEUTROS PCT %  --  88*   LYMPHS PCT %  --  4*   MONOS PCT %  --  5   EOS PCT %  --  2     Results from last 7 days   Lab Units 12/21/22 1953   SODIUM mmol/L 140   POTASSIUM mmol/L 3 2*   CHLORIDE mmol/L 104   CO2 mmol/L 26   BUN mg/dL 11   CREATININE mg/dL 0 54*   ANION GAP mmol/L 10   CALCIUM mg/dL 8 9   ALBUMIN g/dL 3 6   TOTAL BILIRUBIN mg/dL 0 63   ALK PHOS U/L 102   ALT U/L 9   AST U/L 14   GLUCOSE RANDOM mg/dL 112     Results from last 7 days   Lab Units 12/21/22 1953   INR  1 01             Results from last 7 days   Lab Units 12/21/22  2103 12/21/22 1953   LACTIC ACID mmol/L 0 9  --    PROCALCITONIN ng/ml  --  0 09       Lines/Drains:  Invasive Devices     Peripheral Intravenous Line  Duration           Peripheral IV 12/21/22 Right Antecubital <1 day              Imaging: Reviewed radiology reports from this admission including: CT Chest Abdomen Pelvis  CT chest abdomen pelvis w contrast   Final Result by Mahin Crowell DO (12/21 3503)      Scattered airspace opacities in the lower lobes which may represent infection  Recommend short-term follow-up chest CT scan in 3 months evaluate for resolution  Distended gallbladder with gallstones  Further evaluation with right upper quadrant sonogram is recommended to rule out acute cholecystitis  The study was marked in [facility] for immediate notification  Workstation performed: [id number]         XR chest 1 view portable    (Results Pending)       EKG and Other Studies Reviewed on Admission:   · EKG: NSR  HR 84, short PRShort W[place], Nonspecific ST abdnormalities      ** Please Note: This note has been constructed using a voice recognition system   **

## 2022-12-23 PROBLEM — R78.81 BACTEREMIA: Status: ACTIVE | Noted: 2022-12-23

## 2022-12-23 LAB
ALBUMIN SERPL BCG-MCNC: 3.2 G/DL (ref 3.5–5)
ALP SERPL-CCNC: 83 U/L (ref 34–104)
ALT SERPL W P-5'-P-CCNC: 7 U/L (ref 7–52)
ANION GAP SERPL CALCULATED.3IONS-SCNC: 8 MMOL/L (ref 4–13)
AST SERPL W P-5'-P-CCNC: 12 U/L (ref 13–39)
ATRIAL RATE: 84 BPM
BASOPHILS # BLD AUTO: 0.03 THOUSANDS/ÂΜL (ref 0–0.1)
BASOPHILS NFR BLD AUTO: 1 % (ref 0–1)
BILIRUB SERPL-MCNC: 0.48 MG/DL (ref 0.2–1)
BUN SERPL-MCNC: 12 MG/DL (ref 5–25)
CALCIUM ALBUM COR SERPL-MCNC: 9.3 MG/DL (ref 8.3–10.1)
CALCIUM SERPL-MCNC: 8.7 MG/DL (ref 8.4–10.2)
CHLORIDE SERPL-SCNC: 106 MMOL/L (ref 96–108)
CO2 SERPL-SCNC: 26 MMOL/L (ref 21–32)
CREAT SERPL-MCNC: 0.58 MG/DL (ref 0.6–1.3)
EOSINOPHIL # BLD AUTO: 0.01 THOUSAND/ÂΜL (ref 0–0.61)
EOSINOPHIL NFR BLD AUTO: 0 % (ref 0–6)
ERYTHROCYTE [DISTWIDTH] IN BLOOD BY AUTOMATED COUNT: 13.7 % (ref 11.6–15.1)
GFR SERPL CREATININE-BSD FRML MDRD: 87 ML/MIN/1.73SQ M
GLUCOSE SERPL-MCNC: 85 MG/DL (ref 65–140)
HCT VFR BLD AUTO: 36 % (ref 34.8–46.1)
HGB BLD-MCNC: 12.1 G/DL (ref 11.5–15.4)
IMM GRANULOCYTES # BLD AUTO: 0.02 THOUSAND/UL (ref 0–0.2)
IMM GRANULOCYTES NFR BLD AUTO: 0 % (ref 0–2)
LYMPHOCYTES # BLD AUTO: 1.28 THOUSANDS/ÂΜL (ref 0.6–4.47)
LYMPHOCYTES NFR BLD AUTO: 20 % (ref 14–44)
MCH RBC QN AUTO: 32.6 PG (ref 26.8–34.3)
MCHC RBC AUTO-ENTMCNC: 33.6 G/DL (ref 31.4–37.4)
MCV RBC AUTO: 97 FL (ref 82–98)
MONOCYTES # BLD AUTO: 0.72 THOUSAND/ÂΜL (ref 0.17–1.22)
MONOCYTES NFR BLD AUTO: 11 % (ref 4–12)
MRSA NOSE QL CULT: NORMAL
NEUTROPHILS # BLD AUTO: 4.48 THOUSANDS/ÂΜL (ref 1.85–7.62)
NEUTS SEG NFR BLD AUTO: 68 % (ref 43–75)
NRBC BLD AUTO-RTO: 0 /100 WBCS
P AXIS: 73 DEGREES
PLATELET # BLD AUTO: 503 THOUSANDS/UL (ref 149–390)
PMV BLD AUTO: 9.4 FL (ref 8.9–12.7)
POTASSIUM SERPL-SCNC: 3.6 MMOL/L (ref 3.5–5.3)
PR INTERVAL: 110 MS
PROCALCITONIN SERPL-MCNC: 0.06 NG/ML
PROT SERPL-MCNC: 6.2 G/DL (ref 6.4–8.4)
QRS AXIS: 57 DEGREES
QRSD INTERVAL: 78 MS
QT INTERVAL: 360 MS
QTC INTERVAL: 425 MS
RBC # BLD AUTO: 3.71 MILLION/UL (ref 3.81–5.12)
SODIUM SERPL-SCNC: 140 MMOL/L (ref 135–147)
T WAVE AXIS: 45 DEGREES
VENTRICULAR RATE: 84 BPM
WBC # BLD AUTO: 6.54 THOUSAND/UL (ref 4.31–10.16)

## 2022-12-23 PROCEDURE — 84145 PROCALCITONIN (PCT): CPT

## 2022-12-23 PROCEDURE — 87040 BLOOD CULTURE FOR BACTERIA: CPT | Performed by: INTERNAL MEDICINE

## 2022-12-23 PROCEDURE — 80053 COMPREHEN METABOLIC PANEL: CPT

## 2022-12-23 PROCEDURE — 99232 SBSQ HOSP IP/OBS MODERATE 35: CPT | Performed by: INTERNAL MEDICINE

## 2022-12-23 PROCEDURE — 93010 ELECTROCARDIOGRAM REPORT: CPT | Performed by: INTERNAL MEDICINE

## 2022-12-23 PROCEDURE — 99223 1ST HOSP IP/OBS HIGH 75: CPT | Performed by: INTERNAL MEDICINE

## 2022-12-23 PROCEDURE — 85025 COMPLETE CBC W/AUTO DIFF WBC: CPT

## 2022-12-23 RX ORDER — VANCOMYCIN HYDROCHLORIDE 750 MG/150ML
750 INJECTION, SOLUTION INTRAVENOUS EVERY 12 HOURS
Status: DISCONTINUED | OUTPATIENT
Start: 2022-12-23 | End: 2022-12-23

## 2022-12-23 RX ADMIN — GUAIFENESIN 600 MG: 600 TABLET, EXTENDED RELEASE ORAL at 21:08

## 2022-12-23 RX ADMIN — ENOXAPARIN SODIUM 40 MG: 40 INJECTION SUBCUTANEOUS at 08:32

## 2022-12-23 RX ADMIN — PANTOPRAZOLE SODIUM 40 MG: 40 TABLET, DELAYED RELEASE ORAL at 08:32

## 2022-12-23 RX ADMIN — ACETAMINOPHEN 650 MG: 325 TABLET ORAL at 16:24

## 2022-12-23 RX ADMIN — GUAIFENESIN 600 MG: 600 TABLET, EXTENDED RELEASE ORAL at 08:32

## 2022-12-23 RX ADMIN — BENZONATATE 200 MG: 100 CAPSULE ORAL at 16:22

## 2022-12-23 RX ADMIN — BENZONATATE 200 MG: 100 CAPSULE ORAL at 08:32

## 2022-12-23 RX ADMIN — BENZONATATE 200 MG: 100 CAPSULE ORAL at 21:08

## 2022-12-23 RX ADMIN — LOSARTAN POTASSIUM 100 MG: 50 TABLET, FILM COATED ORAL at 08:32

## 2022-12-23 RX ADMIN — VANCOMYCIN HYDROCHLORIDE 1500 MG: 5 INJECTION, POWDER, LYOPHILIZED, FOR SOLUTION INTRAVENOUS at 10:30

## 2022-12-23 RX ADMIN — CEFTRIAXONE 1000 MG: 1 INJECTION, POWDER, FOR SOLUTION INTRAMUSCULAR; INTRAVENOUS at 23:45

## 2022-12-23 NOTE — CONSULTS
Vancomycin IV Pharmacy-to-Dose Consultation    Christina Barton is a [de-identified] y o  female who was receiving Vancomycin IV with management by the Pharmacy Consult service for treatment of Bacteremia (goal -600, trough >10), Pneumonia (goal -600, trough >10)    The patient's Vancomycin therapy has been completed / discontinued  Thank you for allowing us to take part in this patient's care  Pharmacy will sign-off now; please call or re-consult if there are any questions          Fang Scott, VioletaD  Pharmacist

## 2022-12-23 NOTE — ASSESSMENT & PLAN NOTE
Patient presenting with ongoing cough for the past 2 weeks  · Initially had flu-like sxs and was seen by her PCP  Influenza negative, but treated with tamiflu given  tested positive    · Cough initially improved but states worsening of productive cough over past week  · In the ED patient was found to have leukocytosis of 21 32  · FLU/RSV/COVID negative, Procalcitonin WNL x2, Lactic Acid 0 9 D Dimer 0 51  · CT Chest Abdomen Pelvis with contrast performed revealing scattered airspace opacities in the lower lobes  · Urine test for strep pneumo was positive, MRSA culture negative  · Most likely caused by Streptococcus pneumonia    Plan:  · Will continue antibiotic therapy of Ceftriaxone, ID recommendations appreciated  · Per ID recommendations, the patient will benefit from regular 20 pneumococcal vaccination in a few months  · Respiratory Protocol, Aspiration precautrions, Incentive Spirometry  · Tessalon Pearls and Guaifenesin for cough

## 2022-12-23 NOTE — CONSULTS
Consultation - Infectious Disease   Fabi Elberter [de-identified] y o  female MRN: [id number]  Unit/Bed#: S -19 Encounter: [id number]      IMPRESSION & RECOMMENDATIONS:   Impression/Recommendations: This is a [de-identified] y o  female, with diffuse stable medical problems outlined below,  For presumptive influenza 2 weeks ago, admitted on 12/21 for pneumococcal pneumonia  Patient now has coagulase negative Staphylococcus bacteremia  1   Pneumococcal pneumonia  This is most likely post viral (influenza) bacterial pneumonia  Patient is clinically improved on IV ceftriaxone  She had high-level leukocytosis on admission but WBC has normalized  She is not hypoxic and not requiring O2 supplement  Chest CT showed bibasilar infiltrate but no evidence of pleural effusion  Patient remains systemically well, without sepsis or systemic toxicity and hemodynamically stable, without hypotension  Legionella antigen negative  It does not appear the patient had pneumococcal conjugate vaccine previously  She may benefit from Prevnar 20 vaccination  Continue IV ceftriaxone  Monitor temperature/WBC  Monitor respiratory symptoms  Treat x7 days total antibiotic  Transition to amoxicillin at discharge  Recommend regular 20 pneumococcal vaccination in a few months  2   Coagulase-negative Staphylococcus bacteremia  Although bacteremia was noted to be present in 2 admission blood cultures, on further review, it appears the patient only had 1 blood draw, likely inoculated into 2 sets  Patient has no indwelling intravascular foreign body  She is systemically well  This is most likely contaminated blood draw  No antibiotic needed for this  (Will discontinue vancomycin)  Will repeat blood culture to confirm absence of bacteremia  3   Probable influenza in early December  Patient was treated with a course of Tamiflu  Influenza PCR is now negative  No further antiviral necessary      4   Cholelithiasis with distended GB on abdomen/pelvis CT but without other radiological signs of cholecystitis  Abdominal exam is also benign  Doubt cholecystitis clinically  Monitor for development of abdominal pain  Outpatient and current hospitalization records reviewed in detail  Discussed with patient in detail regarding the above plan  Discussed with [facility] service  Thank you for this consultation  We will follow along with you  HISTORY OF PRESENT ILLNESS:  Reason for Consult: Bacteremia  Pneumococcal pneumonia  HPI: Afshin Mirza is a [de-identified] y o  female, with a few stable medical problems outlined below, was treated by PCP for presumptive influenza with Tamiflu in early December, came to the ER on 12/21 with 1 week history of mild dyspnea and worsening cough  On presentation, patient did not have fever but had leukocytosis  CXR was without infiltrate but chest CT showed bibasilar infiltrates  Patient was admitted and started on ceftriaxone/azithromycin for pneumonia  Pneumococcal urine antigen subsequently became positive  Ceftriaxone was continued and azithromycin was discontinued  Today, both admission blood cultures have growth of coagulase-negative Staphylococcus  IV vancomycin was started  We are asked to evaluate the patient  At present, patient states that she feels better  She has no dyspnea at rest now  Cough has been productive of yellow sputum but now clearer  She does complain of left-sided pleuritic chest pain over the last few days due to coughing  No abdominal pain  In early December, patient developed fever/chills with sore throat and myalgia a few days after her  tested positive for influenza  She saw her PCP, had negative influenza PCR but was treated with Tamiflu empirically  She felt well, with resolution of symptoms, prior to current symptoms a week ago  Patient did receive influenza vaccination earlier this fall    He had 1 dose of pneumococcal vaccine many years ago but had a reaction and therefore has not had another pneumococcal vaccination  REVIEW OF SYSTEMS:  A complete system-based review was done  Except for what is noted in HPI above, ROS of systems is otherwise negative  PAST MEDICAL HISTORY:  Past Medical History:   Diagnosis Date   • GERD (gastroesophageal reflux disease)    • Hyperlipidemia    • Hypertension    • Spinal stenosis    • Trapezius muscle spasm     last assessed - 2016     Past Surgical History:   Procedure Laterality Date   • BREAST CYST ASPIRATION Right    • CATARACT EXTRACTION, BILATERAL     • DEBRIDEMENT TENNIS ELBOW     • ELBOW SURGERY Right     tennis elbow   • FASCIOTOMY ELBOW Right     of the lateral epicondyle; last assessed - 2014   • HYSTERECTOMY      WIN-BSO   • OOPHORECTOMY Bilateral    • AZ COLONOSCOPY FLX DX W/COLLJ SPEC WHEN PFRMD N/A 2017    Procedure: EGD AND COLONOSCOPY;  Surgeon: Lata Sandoval MD;  Location: [facility]; Service: Gastroenterology     Problem list reviewed  FAMILY HISTORY:  Non-contributory    SOCIAL HISTORY:  Social History     Substance and Sexual Activity   Alcohol Use No    Comment: Consumes alcohol occasionally per [employer]     Social History     Substance and Sexual Activity   Drug Use No     Social History     Tobacco Use   Smoking Status Former   • Packs/day: 0 50   • Types: Cigarettes   • Start date:    Smokeless Tobacco Former   Tobacco Comments    Quit 2014 -  ppd for 40 years per [employer]       ALLERGIES:  Allergies   Allergen Reactions   • Atorvastatin Edema   • Other      Cholesterol med- name unknown and pneumonia vaccine   • Pneumococcal Vaccines Edema       MEDICATIONS:  All current active medications have been reviewed  Patient is currently on ceftriaxone/vancomycin      PHYSICAL EXAM:  Vitals:  Temp:  [98 °F (36 7 °C)-98 6 °F (37 °C)] 98 °F (36 7 °C)  HR:  [66-78] 66  Resp:  [18-20] 20  BP: (152-184)/(67-78) 152/67  SpO2:  [91 %-95 %] 91 %  Temp (24hrs), Av 3 °F (36 8 °C), Min:98 °F (36 7 °C), Max:98 6 °F (37 °C)  Current: Temperature: 98 °F (36 7 °C)     Physical Exam:  General:  Well-nourished, well-developed, in no acute distress  Awake, alert and oriented x 3  Eyes:  Conjunctive clear with no hemorrhages or effusions  Oropharynx:  No ulcers, no lesions, pharynx benign, no tonsillitis  Neck:  Supple, no lymphadenopathy, no mass, nontender  Lungs:  Expansion symmetric, sparse basilar rhonchi and rales, no wheezing, no accessory muscle use  Cardiac:  Regular rate and rhythm, normal S1, normal S2, no murmurs  Abdomen:  Soft, nondistended, non-tender, no HSM  Extremities:  No edema, no erythema, nontender  No ulcers  Skin:  No rashes, no ulcers  Neurological:  Moves all four extremities spontaneously, sensation grossly intact    LABS, IMAGING, & OTHER STUDIES:  Lab Results:  I have personally reviewed pertinent labs    Results from last 7 days   Lab Units 12/23/22  0431 12/21/22 1953   POTASSIUM mmol/L 3 6 3 2*   CHLORIDE mmol/L 106 104   CO2 mmol/L 26 26   BUN mg/dL 12 11   CREATININE mg/dL 0 58* 0 54*   EGFR ml/min/1 73sq m 87 89   CALCIUM mg/dL 8 7 8 9   AST U/L 12* 14   ALT U/L 7 9   ALK PHOS U/L 83 102     Results from last 7 days   Lab Units 12/23/22  0431 12/22/22  0126 12/21/22  1953   WBC Thousand/uL 6 54  --  21 32*   HEMOGLOBIN g/dL 12 1  --  12 9   PLATELETS Thousands/uL 503* 488* 542*     Results from last 7 days   Lab Units 12/22/22  1543 12/22/22  1253 12/22/22  0126 12/21/22  2103   SPUTUM CULTURE  Culture too young- will reincubate  --   --   --    GRAM STAIN RESULT  2+ Epithelial cells per low power field*  2+ Gram negative rods*  2+ Gram positive cocci in pairs and chains*  Rare Yeast*  Rare Polys*  --   --  Gram positive cocci in clusters*  Gram positive cocci in clusters*   MRSA CULTURE ONLY   --   --  No Methicillin Resistant Staphlyococcus aureus (MRSA) isolated  --    LEGIONELLA URINARY ANTIGEN   --  Negative  --   --        Imaging Studies: I have personally reviewed pertinent imaging study reports and images in PACS  CXR reviewed personally  No consolidations  Chest/abdomen/pelvis CT reviewed personally  Scattered bilateral lower lobe opacities without consolidation  Gallstones with distended gallbladder but no evidence of cholecystitis  EKG, Pathology, and Other Studies:   I have personally reviewed pertinent reports

## 2022-12-23 NOTE — PLAN OF CARE
Problem: PAIN - ADULT  Goal: Verbalizes/displays adequate comfort level or baseline comfort level  Description: Interventions:  - Encourage patient to monitor pain and request assistance  - Assess pain using appropriate pain scale  - Administer analgesics based on type and severity of pain and evaluate response  - Implement non-pharmacological measures as appropriate and evaluate response  - Consider cultural and social influences on pain and pain management  - Notify physician/advanced practitioner if interventions unsuccessful or patient reports new pain  Outcome: Progressing     Problem: INFECTION - ADULT  Goal: Absence or prevention of progression during hospitalization  Description: INTERVENTIONS:  - Assess and monitor for signs and symptoms of infection  - Monitor lab/diagnostic results  - Monitor all insertion sites, i e  indwelling lines, tubes, and drains  - Monitor endotracheal if appropriate and nasal secretions for changes in amount and color  - [place] appropriate cooling/warming therapies per order  - Administer medications as ordered  - Instruct and encourage patient and family to use good hand hygiene technique  - Identify and instruct in appropriate isolation precautions for identified infection/condition  Outcome: Progressing  Goal: Absence of fever/infection during neutropenic period  Description: INTERVENTIONS:  - Monitor WBC    Outcome: Progressing     Problem: SAFETY ADULT  Goal: Patient will remain free of falls  Description: INTERVENTIONS:  - Educate patient/family on patient safety including physical limitations  - Instruct patient to call for assistance with activity   - Consult OT/PT to assist with strengthening/mobility   - Keep Call bell within reach  - Keep bed low and locked with side rails adjusted as appropriate  - Keep care items and personal belongings within reach  - Initiate and maintain comfort rounds  - Make Fall Risk Sign visible to staff  - Offer Toileting every  Hours, in advance of need  - Initiate/Maintain alarm  - Obtain necessary fall risk management equipment:   - Apply yellow socks and bracelet for high fall risk patients  - Consider moving patient to room near nurses station  Outcome: Progressing  Goal: Maintain or return to baseline ADL function  Description: INTERVENTIONS:  -  Assess patient's ability to carry out ADLs; assess patient's baseline for ADL function and identify physical deficits which impact ability to perform ADLs (bathing, care of mouth/teeth, toileting, grooming, dressing, etc )  - Assess/evaluate cause of self-care deficits   - Assess range of motion  - Assess patient's mobility; develop plan if impaired  - Assess patient's need for assistive devices and provide as appropriate  - Encourage maximum independence but intervene and supervise when necessary  - Involve family in performance of ADLs  - Assess for home care needs following discharge   - Consider OT consult to assist with ADL evaluation and planning for discharge  - Provide patient education as appropriate  Outcome: Progressing  Goal: Maintains/Returns to pre admission functional level  Description: INTERVENTIONS:  - Perform BMAT or MOVE assessment daily    - Set and communicate daily mobility goal to care team and patient/family/caregiver  - Collaborate with rehabilitation services on mobility goals if consulted  - Perform Range of Motion  times a day  - Reposition patient every  hours    - Dangle patient  times a day  - Stand patient  times a day  - Ambulate patient  times a day  - Out of bed to chair  times a day   - Out of bed for meals times a day  - Out of bed for toileting  - Record patient progress and toleration of activity level   Outcome: Progressing     Problem: DISCHARGE PLANNING  Goal: Discharge to home or other facility with appropriate resources  Description: INTERVENTIONS:  - Identify barriers to discharge w/patient and caregiver  - Arrange for needed discharge resources and transportation as appropriate  - Identify discharge learning needs (meds, wound care, etc )  - Arrange for interpretive services to assist at discharge as needed  - Refer to Case Management Department for coordinating discharge planning if the patient needs post-hospital services based on physician/advanced practitioner order or complex needs related to functional status, cognitive ability, or social support system  Outcome: Progressing     Problem: Knowledge Deficit  Goal: Patient/family/caregiver demonstrates understanding of disease process, treatment plan, medications, and discharge instructions  Description: Complete learning assessment and assess knowledge base    Interventions:  - Provide teaching at level of understanding  - Provide teaching via preferred learning methods  Outcome: Progressing     Problem: RESPIRATORY - ADULT  Goal: Achieves optimal ventilation and oxygenation  Description: INTERVENTIONS:  - Assess for changes in respiratory status  - Assess for changes in mentation and behavior  - Position to facilitate oxygenation and minimize respiratory effort  - Oxygen administered by appropriate delivery if ordered  - Initiate smoking cessation education as indicated  - Encourage broncho-pulmonary hygiene including cough, deep breathe, Incentive Spirometry  - Assess the need for suctioning and aspirate as needed  - Assess and instruct to report SOB or any respiratory difficulty  - Respiratory Therapy support as indicated  Outcome: Progressing     Problem: METABOLIC, FLUID AND ELECTROLYTES - ADULT  Goal: Electrolytes maintained within normal limits  Description: INTERVENTIONS:  - Monitor labs and assess patient for signs and symptoms of electrolyte imbalances  - Administer electrolyte replacement as ordered  - Monitor response to electrolyte replacements, including repeat lab results as appropriate  - Instruct patient on fluid and nutrition as appropriate  Outcome: Progressing  Goal: Fluid balance maintained  Description: INTERVENTIONS:  - Monitor labs   - Monitor I/O and WT  - Instruct patient on fluid and nutrition as appropriate  - Assess for signs & symptoms of volume excess or deficit  Outcome: Progressing  Goal: Glucose maintained within target range  Description: INTERVENTIONS:  - Monitor Blood Glucose as ordered  - Assess for signs and symptoms of hyperglycemia and hypoglycemia  - Administer ordered medications to maintain glucose within target range  - Assess nutritional intake and initiate nutrition service referral as needed  Outcome: Progressing     Problem: Potential for Falls  Goal: Patient will remain free of falls  Description: INTERVENTIONS:  - Educate patient/family on patient safety including physical limitations  - Instruct patient to call for assistance with activity   - Consult OT/PT to assist with strengthening/mobility   - Keep Call bell within reach  - Keep bed low and locked with side rails adjusted as appropriate  - Keep care items and personal belongings within reach  - Initiate and maintain comfort rounds  - Make Fall Risk Sign visible to staff  - Offer Toileting every  Hours, in advance of need  - Initiate/Maintainalarm  - Obtain necessary fall risk management equipment:   - Apply yellow socks and bracelet for high fall risk patients  - Consider moving patient to room near nurses station  Outcome: Progressing

## 2022-12-23 NOTE — PROGRESS NOTES
[facility]  Progress Note - Ronnie Santiago 1940, [de-identified] y o  female MRN: [id number]  Unit/Bed#: S -13 Encounter: [id number]  Primary Care Provider: IRAM Benietz   Date and time admitted to hospital: 12/21/2022  7:37 PM    * Multifocal pneumonia  Assessment & Plan  Patient presenting with ongoing cough for the past 2 weeks  · Initially had flu-like sxs and was seen by her PCP  Influenza negative, but treated with tamiflu given  tested positive  · Cough initially improved but states worsening of productive cough over past week  · In the ED patient was found to have leukocytosis of 21 32  · FLU/RSV/COVID negative, Procalcitonin WNL x2, Lactic Acid 0 9 D Dimer 0 51  · CT Chest Abdomen Pelvis with contrast performed revealing scattered airspace opacities in the lower lobes  · Urine test for strep pneumo was positive, MRSA culture negative  · Most likely caused by Streptococcus pneumonia    Plan:  · Will continue antibiotic therapy of Ceftriaxone, ID recommendations appreciated  · Per ID recommendations, the patient will benefit from regular 20 pneumococcal vaccination in a few months  · Respiratory Protocol, Aspiration precautrions, Incentive Spirometry  · Tessalon Pearls and Guaifenesin for cough    Bacteremia  Assessment & Plan  · The patient blood culture was positive for coagulase-negative staph   · Positive on blood cultures  · ID consulted for this  · Turns out with further review patient had only one blood draw likely inoculated into 2 sets  · No antibiotic needed, per ID vancomycin is discontinued  · Repeat blood culture to confirm absence of bacteremia  · ID recommendations appreciated    Hypokalemia  Assessment & Plan  POA with K of 3 2  Plan:  · Potassium replenishment as needed    Abnormal CT scan, gallbladder  Assessment & Plan  CT Chest Abdomen Pelvis: Distended gallbladder with gallstones    Further evaluation with right upper quadrant sonogram was recommended to rule out acute cholecystitis  · Normal AST/ALT Alk Phos  · Denies RUQ abdominal pain, negative mendoza's  · Was evaluated by General Surgery in the ED who reported that there was no current indications for surgical/procedural interventions at this time, and no need for further GB imaging    Flank pain  Assessment & Plan  Left sided flank pain worsens with movement and with pressure for the past 4 days  · Point tenderness along rib  · UA: unremarkable, denies urinary symptoms  · CT Chest Abdomen Pelvis without hydronephrosis or urinary tract calculus  · Improved with lidocaine patch in the ED  Plan:  · Will continue lidocaine patch    Hypertension  Assessment & Plan  History of HTN, home regimen of losartan  Hypertensive with a blood pressure 230/95, was given labetalol in the ED with improvement of pressures  Plan:  · Continue to monitor BP  · Will continue home meds          VTE Pharmacologic Prophylaxis: VTE Score: 3 Moderate Risk (Score 3-4) - Pharmacological DVT Prophylaxis Ordered: enoxaparin (Lovenox)  Patient Centered Rounds: I performed bedside rounds with nursing staff today  Discussions with Specialists or Other Care Team Provider: ID    Education and Discussions with Family / Patient: Attempted to update  () via phone  Unable to contact  Did not get an answer  Current Length of Stay: 2 day(s)  Current Patient Status: Inpatient   Discharge Plan: Anticipate discharge in 24-48 hrs to home  Code Status: Level 1 - Full Code    Subjective: The patient says that overall she feels improved  Mentions that her flank pain is still there when she moves but seems like lidocaine patch is working      Objective:     Vitals:   Temp (24hrs), Av 3 °F (36 8 °C), Min:98 °F (36 7 °C), Max:98 6 °F (37 °C)    Temp:  [98 °F (36 7 °C)-98 6 °F (37 °C)] 98 2 °F (36 8 °C)  HR:  [66-78] 73  Resp:  [18-20] 20  BP: (152-184)/(67-78) 160/75  SpO2:  [90 %-95 %] 90 %  Body mass index is 22 86 kg/m²       Input and Output Summary (last 24 hours):   No intake or output data in the 24 hours ending 12/23/22 1506    Physical Exam:   Physical Exam     Additional Data:     Labs:  Results from last 7 days   Lab Units 12/23/22  0431   WBC Thousand/uL 6 54   HEMOGLOBIN g/dL 12 1   HEMATOCRIT % 36 0   PLATELETS Thousands/uL 503*   NEUTROS PCT % 68   LYMPHS PCT % 20   MONOS PCT % 11   EOS PCT % 0     Results from last 7 days   Lab Units 12/23/22  0431   SODIUM mmol/L 140   POTASSIUM mmol/L 3 6   CHLORIDE mmol/L 106   CO2 mmol/L 26   BUN mg/dL 12   CREATININE mg/dL 0 58*   ANION GAP mmol/L 8   CALCIUM mg/dL 8 7   ALBUMIN g/dL 3 2*   TOTAL BILIRUBIN mg/dL 0 48   ALK PHOS U/L 83   ALT U/L 7   AST U/L 12*   GLUCOSE RANDOM mg/dL 85     Results from last 7 days   Lab Units 12/21/22  1953   INR  1 01             Results from last 7 days   Lab Units 12/23/22  0431 12/22/22  0525 12/21/22 2103 12/21/22 1953   LACTIC ACID mmol/L  --   --  0 9  --    PROCALCITONIN ng/ml 0 06 0 10  --  0 09       Lines/Drains:  Invasive Devices     Peripheral Intravenous Line  Duration           Peripheral IV 12/21/22 Right Antecubital 1 day                      Imaging: Reviewed radiology reports from this admission including: chest CT scan    Recent Cultures (last 7 days):   Results from last 7 days   Lab Units 12/22/22  1543 12/22/22  1253 12/21/22 2103   SPUTUM CULTURE  Culture too young- will reincubate  --   --    GRAM STAIN RESULT  2+ Epithelial cells per low power field*  2+ Gram negative rods*  2+ Gram positive cocci in pairs and chains*  Rare Yeast*  Rare Polys*  --  Gram positive cocci in clusters*  Gram positive cocci in clusters*   LEGIONELLA URINARY ANTIGEN   --  Negative  --        Last 24 Hours Medication List:   Current Facility-Administered Medications   Medication Dose Route Frequency Provider Last Rate   • acetaminophen  650 mg Oral Q6H PRN Terrence Devi MD     • benzonatate  200 mg Oral TID Tomás Gonsalez Maddison, DO     • cefTRIAXone  1,000 mg Intravenous Q24H [facility] Dicesare, DO 1,000 mg (12/22/22 1265)   • enoxaparin  40 mg Subcutaneous Daily Aubree Harden DO     • guaiFENesin  600 mg Oral Q12H [facility] Dicesare, DO     • losartan  100 mg Oral Daily [facility] Christaare, DO     • pantoprazole  40 mg Oral Daily Aubree Harden DO          Today, Patient Was Seen By: Devan Langley MD    **Please Note: This note may have been constructed using a voice recognition system  **

## 2022-12-23 NOTE — PLAN OF CARE
Problem: PAIN - ADULT  Goal: Verbalizes/displays adequate comfort level or baseline comfort level  Description: Interventions:  - Encourage patient to monitor pain and request assistance  - Assess pain using appropriate pain scale  - Administer analgesics based on type and severity of pain and evaluate response  - Implement non-pharmacological measures as appropriate and evaluate response  - Consider cultural and social influences on pain and pain management  - Notify physician/advanced practitioner if interventions unsuccessful or patient reports new pain  Outcome: Progressing     Problem: INFECTION - ADULT  Goal: Absence or prevention of progression during hospitalization  Description: INTERVENTIONS:  - Assess and monitor for signs and symptoms of infection  - Monitor lab/diagnostic results  - Monitor all insertion sites, i e  indwelling lines, tubes, and drains  - Monitor endotracheal if appropriate and nasal secretions for changes in amount and color  - [place] appropriate cooling/warming therapies per order  - Administer medications as ordered  - Instruct and encourage patient and family to use good hand hygiene technique  - Identify and instruct in appropriate isolation precautions for identified infection/condition  Outcome: Progressing  Goal: Absence of fever/infection during neutropenic period  Description: INTERVENTIONS:  - Monitor WBC    Outcome: Progressing     Problem: SAFETY ADULT  Goal: Patient will remain free of falls  Description: INTERVENTIONS:  - Educate patient/family on patient safety including physical limitations  - Instruct patient to call for assistance with activity   - Consult OT/PT to assist with strengthening/mobility   - Keep Call bell within reach  - Keep bed low and locked with side rails adjusted as appropriate  - Keep care items and personal belongings within reach  - Initiate and maintain comfort rounds  - Make Fall Risk Sign visible to staff  - Offer Toileting every 2 Hours, in advance of need  - Initiate/Maintain bed alarm  - Obtain necessary fall risk management equipment: alarms  - Apply yellow socks and bracelet for high fall risk patients  - Consider moving patient to room near nurses station  Outcome: Progressing  Goal: Maintain or return to baseline ADL function  Description: INTERVENTIONS:  -  Assess patient's ability to carry out ADLs; assess patient's baseline for ADL function and identify physical deficits which impact ability to perform ADLs (bathing, care of mouth/teeth, toileting, grooming, dressing, etc )  - Assess/evaluate cause of self-care deficits   - Assess range of motion  - Assess patient's mobility; develop plan if impaired  - Assess patient's need for assistive devices and provide as appropriate  - Encourage maximum independence but intervene and supervise when necessary  - Involve family in performance of ADLs  - Assess for home care needs following discharge   - Consider OT consult to assist with ADL evaluation and planning for discharge  - Provide patient education as appropriate  Outcome: Progressing  Goal: Maintains/Returns to pre admission functional level  Description: INTERVENTIONS:  - Perform BMAT or MOVE assessment daily    - Set and communicate daily mobility goal to care team and patient/family/caregiver  - Collaborate with rehabilitation services on mobility goals if consulted  - Perform Range of Motion 4 times a day  - Reposition patient every 4 hours    - Dangle patient 4 times a day  - Stand patient 4 times a day  - Ambulate patient 4 times a day  - Out of bed to chair 3 times a day   - Out of bed for meals 3 times a day  - Out of bed for toileting  - Record patient progress and toleration of activity level   Outcome: Progressing     Problem: DISCHARGE PLANNING  Goal: Discharge to home or other facility with appropriate resources  Description: INTERVENTIONS:  - Identify barriers to discharge w/patient and caregiver  - Arrange for needed discharge resources and transportation as appropriate  - Identify discharge learning needs (meds, wound care, etc )  - Arrange for interpretive services to assist at discharge as needed  - Refer to Case Management Department for coordinating discharge planning if the patient needs post-hospital services based on physician/advanced practitioner order or complex needs related to functional status, cognitive ability, or social support system  Outcome: Progressing     Problem: Knowledge Deficit  Goal: Patient/family/caregiver demonstrates understanding of disease process, treatment plan, medications, and discharge instructions  Description: Complete learning assessment and assess knowledge base    Interventions:  - Provide teaching at level of understanding  - Provide teaching via preferred learning methods  Outcome: Progressing     Problem: RESPIRATORY - ADULT  Goal: Achieves optimal ventilation and oxygenation  Description: INTERVENTIONS:  - Assess for changes in respiratory status  - Assess for changes in mentation and behavior  - Position to facilitate oxygenation and minimize respiratory effort  - Oxygen administered by appropriate delivery if ordered  - Initiate smoking cessation education as indicated  - Encourage broncho-pulmonary hygiene including cough, deep breathe, Incentive Spirometry  - Assess the need for suctioning and aspirate as needed  - Assess and instruct to report SOB or any respiratory difficulty  - Respiratory Therapy support as indicated  Outcome: Progressing     Problem: METABOLIC, FLUID AND ELECTROLYTES - ADULT  Goal: Electrolytes maintained within normal limits  Description: INTERVENTIONS:  - Monitor labs and assess patient for signs and symptoms of electrolyte imbalances  - Administer electrolyte replacement as ordered  - Monitor response to electrolyte replacements, including repeat lab results as appropriate  - Instruct patient on fluid and nutrition as appropriate  Outcome: Progressing  Goal: Fluid balance maintained  Description: INTERVENTIONS:  - Monitor labs   - Monitor I/O and WT  - Instruct patient on fluid and nutrition as appropriate  - Assess for signs & symptoms of volume excess or deficit  Outcome: Progressing  Goal: Glucose maintained within target range  Description: INTERVENTIONS:  - Monitor Blood Glucose as ordered  - Assess for signs and symptoms of hyperglycemia and hypoglycemia  - Administer ordered medications to maintain glucose within target range  - Assess nutritional intake and initiate nutrition service referral as needed  Outcome: Progressing     Problem: Potential for Falls  Goal: Patient will remain free of falls  Description: INTERVENTIONS:  - Educate patient/family on patient safety including physical limitations  - Instruct patient to call for assistance with activity   - Consult OT/PT to assist with strengthening/mobility   - Keep Call bell within reach  - Keep bed low and locked with side rails adjusted as appropriate  - Keep care items and personal belongings within reach  - Initiate and maintain comfort rounds  - Make Fall Risk Sign visible to staff  - Offer Toileting every 2 Hours, in advance of need  - Initiate/Maintain bed alarm  - Obtain necessary fall risk management equipment: alarms  - Apply yellow socks and bracelet for high fall risk patients  - Consider moving patient to room near nurses station  Outcome: Progressing

## 2022-12-23 NOTE — PROGRESS NOTES
Gunnar Velasquez is a [de-identified] y o  female who is currently ordered Vancomycin IV with management by the Pharmacy Consult service  Relevant clinical data and objective / subjective history reviewed  Vancomycin Assessment:  Indication and Goal AUC/Trough: Bacteremia (goal -600, trough >10); Pneumonia (goal -600, trough >10), -600, trough >10  Clinical Status: stable  Micro:     Renal Function:  SCr: 0 58 mg/dL  CrCl: 67 6 mL/min  Renal replacement: Not on dialysis  Days of Therapy: 1  Current Dose: 1500 mg (25 mg/kg) IV once as loading dose   Vancomycin Plan:  New Dosin mg IV q12h  Estimated AUC: 515 mcg*hr/mL  Estimated Trough: 16 4 mcg/mL  Next Level:  @ 0600  Renal Function Monitoring: Daily BMP and [place] will continue to follow closely for s/sx of nephrotoxicity, infusion reactions and appropriateness of therapy  BMP and CBC will be ordered per protocol  We will continue to follow the patient’s culture results and clinical progress daily      Dalila Du, Pharmacist

## 2022-12-23 NOTE — ASSESSMENT & PLAN NOTE
· The patient blood culture was positive for coagulase-negative staph   · Positive on blood cultures  · ID consulted for this  · Turns out with further review patient had only one blood draw likely inoculated into 2 sets  · No antibiotic needed, per ID vancomycin is discontinued  · Repeat blood culture to confirm absence of bacteremia  · ID recommendations appreciated

## 2022-12-24 LAB
BACTERIA SPT RESP CULT: ABNORMAL
GRAM STN SPEC: ABNORMAL

## 2022-12-24 PROCEDURE — 99232 SBSQ HOSP IP/OBS MODERATE 35: CPT | Performed by: INTERNAL MEDICINE

## 2022-12-24 PROCEDURE — 99233 SBSQ HOSP IP/OBS HIGH 50: CPT | Performed by: INTERNAL MEDICINE

## 2022-12-24 RX ADMIN — BENZONATATE 200 MG: 100 CAPSULE ORAL at 08:04

## 2022-12-24 RX ADMIN — ACETAMINOPHEN 650 MG: 325 TABLET ORAL at 00:28

## 2022-12-24 RX ADMIN — GUAIFENESIN 600 MG: 600 TABLET, EXTENDED RELEASE ORAL at 08:05

## 2022-12-24 RX ADMIN — LOSARTAN POTASSIUM 100 MG: 50 TABLET, FILM COATED ORAL at 08:04

## 2022-12-24 RX ADMIN — ACETAMINOPHEN 650 MG: 325 TABLET ORAL at 23:59

## 2022-12-24 RX ADMIN — BENZONATATE 200 MG: 100 CAPSULE ORAL at 16:43

## 2022-12-24 RX ADMIN — ACETAMINOPHEN 650 MG: 325 TABLET ORAL at 08:04

## 2022-12-24 RX ADMIN — BENZONATATE 200 MG: 100 CAPSULE ORAL at 23:49

## 2022-12-24 RX ADMIN — GUAIFENESIN 600 MG: 600 TABLET, EXTENDED RELEASE ORAL at 23:49

## 2022-12-24 RX ADMIN — ENOXAPARIN SODIUM 40 MG: 40 INJECTION SUBCUTANEOUS at 08:04

## 2022-12-24 RX ADMIN — ACETAMINOPHEN 650 MG: 325 TABLET ORAL at 16:44

## 2022-12-24 RX ADMIN — CEFTRIAXONE 1000 MG: 1 INJECTION, POWDER, FOR SOLUTION INTRAMUSCULAR; INTRAVENOUS at 23:48

## 2022-12-24 RX ADMIN — PANTOPRAZOLE SODIUM 40 MG: 40 TABLET, DELAYED RELEASE ORAL at 08:05

## 2022-12-24 NOTE — ASSESSMENT & PLAN NOTE
History of HTN, home regimen of losartan  Hypertensive with a blood pressure 230/95, was given labetalol in the ED with improvement of pressures    Plan:  · Will continue home meds

## 2022-12-24 NOTE — ASSESSMENT & PLAN NOTE
Patient presenting with ongoing cough for the past 2 weeks  · Initially had flu-like sxs and was seen by her PCP  Influenza negative, but treated with tamiflu given  tested positive  · Cough initially improved but states worsening of productive cough over past week  · In the ED patient was found to have leukocytosis of 21 32  · FLU/RSV/COVID negative, Procalcitonin WNL x2, Lactic Acid 0 9 D Dimer 0 51  · CT Chest Abdomen Pelvis with contrast performed revealing scattered airspace opacities in the lower lobes  · Urine test for strep pneumo was positive, MRSA culture negative  · Most likely caused by Streptococcus pneumonia  · Patient is feeling much better  Resolving    Plan:  · Has been receiving antibiotic therapy of Ceftriaxone, ID recommendations appreciated    Transitioning to amoxicillin with total antibiotics of 7-days  · Per ID recommendations, the patient will benefit from regular 20 pneumococcal vaccination in a few months  · Tessalon Pearls and Guaifenesin for cough

## 2022-12-24 NOTE — PROGRESS NOTES
Progress Note - Infectious Disease   Lauren Xiong [de-identified] y o  female MRN: [id number]  Unit/Bed#: S -01 Encounter: [id number]      Impression/Recommendations:  1  Pneumococcal pneumonia  This is most likely post viral (influenza) bacterial pneumonia  Patient is clinically improved on IV ceftriaxone  She had high-level leukocytosis on admission but WBC has normalized  She is not hypoxic and not requiring O2 supplement  Chest CT showed bibasilar infiltrate but no evidence of pleural effusion  Patient remains systemically well, without sepsis or systemic toxicity and hemodynamically stable, without hypotension  Legionella antigen negative  It does not appear the patient had pneumococcal conjugate vaccine previously  She may benefit from Prevnar 20 vaccination  Continue IV ceftriaxone  Monitor temperature/WBC  Monitor respiratory symptoms  Treat x7 days total antibiotic  Transition to amoxicillin at discharge  Recommend Prevnar 20 pneumococcal vaccination in a few months      2   Coagulase-negative Staphylococcus bacteremia  Although bacteremia was noted to be present in 2 admission blood cultures, on further review, it appears the patient only had 1 blood draw, likely inoculated into 2 sets  Patient has no indwelling intravascular foreign body  She is systemically well  This is most likely contaminated blood draw  Repeat blood cultures have no growth thus far  No antibiotic needed for this  Follow-up repeat blood culture      3  Probable influenza in early December  Patient was treated with a course of Tamiflu  Influenza PCR is now negative  No further antiviral necessary      4  Cholelithiasis with distended GB on abdomen/pelvis CT but without other radiological signs of cholecystitis  Abdominal exam is also benign  Doubt cholecystitis clinically  Monitor for development of abdominal pain      Discussed with patient in detail regarding the above plan    Discharge plan per primary service  Antibiotics:  Ceftriaxone # 3    Subjective:  Patient feels better  Dyspnea and cough improved  Temperature stays down  No chills  She is tolerating antibiotic well  No nausea, vomiting or diarrhea  Objective:  Vitals:  Temp:  [98 2 °F (36 8 °C)-98 5 °F (36 9 °C)] 98 5 °F (36 9 °C)  HR:  [66-73] 71  Resp:  [16-18] 18  BP: (132-187)/(73-81) 187/81  SpO2:  [90 %-94 %] 94 %  Temp (24hrs), Av 4 °F (36 9 °C), Min:98 2 °F (36 8 °C), Max:98 5 °F (36 9 °C)  Current: Temperature: 98 5 °F (36 9 °C)    Physical Exam:     General: Awake, alert, cooperative, no distress  Neck:  Supple  No mass  No lymphadenopathy  Lungs: Expansion symmetric, sparse bibasilar rales, no wheezing, respirations unlabored  Heart:  Regular rate and rhythm, S1 and S2 normal, no murmur  Abdomen: Soft, nondistended, non-tender, bowel sounds active all four quadrants, no masses, no organomegaly  Extremities: No edema  No erythema/warmth  No ulcer  Nontender to palpation  Skin:  No rash  Neuro: Moves all extremities  Invasive Devices     Peripheral Intravenous Line  Duration           Peripheral IV 22 Right Antecubital 2 days                Labs studies:   I have personally reviewed pertinent labs  Results from last 7 days   Lab Units 22  0431 22   POTASSIUM mmol/L 3 6 3 2*   CHLORIDE mmol/L 106 104   CO2 mmol/L 26 26   BUN mg/dL 12 11   CREATININE mg/dL 0 58* 0 54*   EGFR ml/min/1 73sq m 87 89   CALCIUM mg/dL 8 7 8 9   AST U/L 12* 14   ALT U/L 7 9   ALK PHOS U/L 83 102     Results from last 7 days   Lab Units 22  0431 22  0126 22  1953   WBC Thousand/uL 6 54  --  21 32*   HEMOGLOBIN g/dL 12 1  --  12 9   PLATELETS Thousands/uL 503* 488* 542*     Results from last 7 days   Lab Units 22  1421 22  1543 22  1253 22  0126 12/21/22  2103   BLOOD CULTURE  Received in Microbiology Lab  Culture in Progress  Received in Microbiology Lab   Culture in Progress  --   --   --   --    SPUTUM CULTURE   --  Culture too young- will reincubate  --   --   --    GRAM STAIN RESULT   --  2+ Epithelial cells per low power field*  2+ Gram negative rods*  2+ Gram positive cocci in pairs and chains*  Rare Yeast*  Rare Polys*  --   --  Gram positive cocci in clusters*  Gram positive cocci in clusters*   MRSA CULTURE ONLY   --   --   --  No Methicillin Resistant Staphlyococcus aureus (MRSA) isolated  --    LEGIONELLA URINARY ANTIGEN   --   --  Negative  --   --        Imaging Studies:   I have personally reviewed pertinent imaging study reports and images in PACS  EKG, Pathology, and Other Studies:   I have personally reviewed pertinent reports

## 2022-12-24 NOTE — ASSESSMENT & PLAN NOTE
· The patient blood culture was positive for coagulase-negative staph   · Positive on blood cultures  · ID consulted for this  · Turns out with further review patient had only one blood draw likely inoculated into 2 sets  · No antibiotic needed, per ID vancomycin is discontinued  · Repeat blood culture to confirm absence of bacteremia, pending result  · ID recommendations appreciated

## 2022-12-24 NOTE — PROGRESS NOTES
[facility]  Progress Note - Varsha Jason 1940, [de-identified] y o  female MRN: [id number]  Unit/Bed#: S -80 Encounter: [id number]  Primary Care Provider: IRAM Rosen   Date and time admitted to hospital: 12/21/2022  7:37 PM    * Multifocal pneumonia  Assessment & Plan  Patient presenting with ongoing cough for the past 2 weeks  · Initially had flu-like sxs and was seen by her PCP  Influenza negative, but treated with tamiflu given  tested positive  · Cough initially improved but states worsening of productive cough over past week  · In the ED patient was found to have leukocytosis of 21 32  · FLU/RSV/COVID negative, Procalcitonin WNL x2, Lactic Acid 0 9 D Dimer 0 51  · CT Chest Abdomen Pelvis with contrast performed revealing scattered airspace opacities in the lower lobes  · Urine test for strep pneumo was positive, MRSA culture negative  · Most likely caused by Streptococcus pneumonia    Plan:  · Will continue antibiotic therapy of Ceftriaxone, ID recommendations appreciated  · Per ID recommendations, the patient will benefit from regular 20 pneumococcal vaccination in a few months  · Respiratory Protocol, Aspiration precautrions, Incentive Spirometry  · Tessalon Pearls and Guaifenesin for cough    Bacteremia  Assessment & Plan  · The patient blood culture was positive for coagulase-negative staph   · Positive on blood cultures  · ID consulted for this  · Turns out with further review patient had only one blood draw likely inoculated into 2 sets  · No antibiotic needed, per ID vancomycin is discontinued  · Repeat blood culture to confirm absence of bacteremia, pending result  · ID recommendations appreciated    Hypokalemia  Assessment & Plan  POA with K of 3 2  Plan:  · Potassium replenishment as needed    Abnormal CT scan, gallbladder  Assessment & Plan  CT Chest Abdomen Pelvis: Distended gallbladder with gallstones    Further evaluation with right upper quadrant sonogram was recommended to rule out acute cholecystitis  · Normal AST/ALT Alk Phos  · Denies RUQ abdominal pain, negative mendoza's  · Was evaluated by General Surgery in the ED who reported that there was no current indications for surgical/procedural interventions at this time, and no need for further GB imaging    Flank pain  Assessment & Plan  Left sided flank pain worsens with movement and with pressure for the past 4 days  · Point tenderness along rib  · UA: unremarkable, denies urinary symptoms  · CT Chest Abdomen Pelvis without hydronephrosis or urinary tract calculus  · Improved with lidocaine patch in the ED  Plan:  · Will continue lidocaine patch    Hypertension  Assessment & Plan  History of HTN, home regimen of losartan  Hypertensive with a blood pressure 230/95, was given labetalol in the ED with improvement of pressures  Plan:  · Continue to monitor BP  · Will continue home meds            VTE Pharmacologic Prophylaxis: VTE Score: 3 Moderate Risk (Score 3-4) - Pharmacological DVT Prophylaxis Ordered: enoxaparin (Lovenox)  Patient Centered Rounds: I performed bedside rounds with nursing staff today  Discussions with Specialists or Other Care Team Provider: ID    Education and Discussions with Family / Patient: Patient declined call to   Current Length of Stay: 3 day(s)  Current Patient Status: Inpatient   Discharge Plan: Anticipate discharge in 24-48 hrs to home  Code Status: Level 1 - Full Code    Subjective: The patient says that she feels improved  Her breathing is better now  She says that now the phlegm is cleared up  Denies any SOB, chest pain or pressure  Says that she has no issues regarding bowel movements either       Objective:     Vitals:   Temp (24hrs), Av 4 °F (36 9 °C), Min:98 2 °F (36 8 °C), Max:98 5 °F (36 9 °C)    Temp:  [98 2 °F (36 8 °C)-98 5 °F (36 9 °C)] 98 5 °F (36 9 °C)  HR:  [66-73] 71  Resp:  [16-18] 18  BP: (132-187)/(73-81) 187/81  SpO2:  [90 %-94 %] 94 %  Body mass index is 22 86 kg/m²  Input and Output Summary (last 24 hours): Intake/Output Summary (Last 24 hours) at 12/24/2022 0855  Last data filed at 12/24/2022 0544  Gross per 24 hour   Intake 150 ml   Output --   Net 150 ml       Physical Exam:   Physical Exam  Vitals and nursing note reviewed  Constitutional:       General: She is not in acute distress  Appearance: She is well-developed  HENT:      Head: Normocephalic and atraumatic  Eyes:      Conjunctiva/sclera: Conjunctivae normal    Cardiovascular:      Rate and Rhythm: Normal rate and regular rhythm  Heart sounds: No murmur heard  Pulmonary:      Effort: Pulmonary effort is normal  No respiratory distress  Breath sounds: Rhonchi (bilateral basilar, improved compared to yesterday) present  Abdominal:      Palpations: Abdomen is soft  Tenderness: There is no abdominal tenderness  Musculoskeletal:         General: No swelling  Cervical back: Neck supple  Skin:     General: Skin is warm and dry  Capillary Refill: Capillary refill takes less than 2 seconds  Neurological:      Mental Status: She is alert     Psychiatric:         Mood and Affect: Mood normal           Additional Data:     Labs:  Results from last 7 days   Lab Units 12/23/22  0431   WBC Thousand/uL 6 54   HEMOGLOBIN g/dL 12 1   HEMATOCRIT % 36 0   PLATELETS Thousands/uL 503*   NEUTROS PCT % 68   LYMPHS PCT % 20   MONOS PCT % 11   EOS PCT % 0     Results from last 7 days   Lab Units 12/23/22  0431   SODIUM mmol/L 140   POTASSIUM mmol/L 3 6   CHLORIDE mmol/L 106   CO2 mmol/L 26   BUN mg/dL 12   CREATININE mg/dL 0 58*   ANION GAP mmol/L 8   CALCIUM mg/dL 8 7   ALBUMIN g/dL 3 2*   TOTAL BILIRUBIN mg/dL 0 48   ALK PHOS U/L 83   ALT U/L 7   AST U/L 12*   GLUCOSE RANDOM mg/dL 85     Results from last 7 days   Lab Units 12/21/22  1953   INR  1 01             Results from last 7 days   Lab Units 12/23/22  0431 12/22/22  0525 12/21/22 2103 12/21/22 1953   LACTIC ACID mmol/L  --   --  0 9  --    PROCALCITONIN ng/ml 0 06 0 10  --  0 09       Lines/Drains:  Invasive Devices     Peripheral Intravenous Line  Duration           Peripheral IV 12/21/22 Right Antecubital 2 days                      Imaging: No pertinent imaging reviewed  Recent Cultures (last 7 days):   Results from last 7 days   Lab Units 12/23/22  1421 12/22/22  1543 12/22/22  1253 12/21/22  2103   BLOOD CULTURE  Received in Microbiology Lab  Culture in Progress  Received in Microbiology Lab  Culture in Progress  --   --   --    SPUTUM CULTURE   --  Culture too young- will reincubate  --   --    GRAM STAIN RESULT   --  2+ Epithelial cells per low power field*  2+ Gram negative rods*  2+ Gram positive cocci in pairs and chains*  Rare Yeast*  Rare Polys*  --  Gram positive cocci in clusters*  Gram positive cocci in clusters*   LEGIONELLA URINARY ANTIGEN   --   --  Negative  --        Last 24 Hours Medication List:   Current Facility-Administered Medications   Medication Dose Route Frequency Provider Last Rate   • acetaminophen  650 mg Oral Q6H PRN Jesús Robison MD     • benzonatate  200 mg Oral TID Norrine Pont Dicesare, DO     • cefTRIAXone  1,000 mg Intravenous Q24H Norrine Pont Dicesare, DO Stopped (12/24/22 0047)   • enoxaparin  40 mg Subcutaneous Daily Norrine Pont Dicesare, DO     • guaiFENesin  600 mg Oral Q12H [place] Dicesare, DO     • losartan  100 mg Oral Daily Norrine Pont Dicesare, DO     • pantoprazole  40 mg Oral Daily Windy Balloon, DO          Today, Patient Was Seen By: Geovani Hayes MD    **Please Note: This note may have been constructed using a voice recognition system  **

## 2022-12-24 NOTE — ASSESSMENT & PLAN NOTE
· The patient blood culture was positive for coagulase-negative staph, she was initially planned to be started on vancomycin but per ID most likely this has been a contamination because the blood culture was drawn from one site    Repeat blood cultures are negative, the patient will not need    · ID consulted, recommendations appreciated  · Turns out with further review patient had only one blood draw likely inoculated into 2 sets  · No antibiotic needed, per ID vancomycin is discontinued  · Repeat blood culture to confirm absence of bacteremia negative so far

## 2022-12-24 NOTE — DISCHARGE INSTR - AVS FIRST PAGE
Dear Lauren Xiong,     It was our pleasure to care for you here at [facility]  It is our hope that we were always able to exceed the expected standards for your care during your stay  You were hospitalized due to Pneumonia  You were cared for on the 4th floor by Merlene Guzman MD under the service of Lindsey Rosales MD with the [facility] who covers for your primary care physician (PCP), IRAM Wolf, while you were hospitalized  If you have any questions or concerns related to this hospitalization, you may contact us at [phone]  For follow up as well as any medication refills, we recommend that you follow up with your primary care physician  A registered nurse will reach out to you by phone within a few days after your discharge to answer any additional questions that you may have after going home  However, at this time we provide for you here, the most important instructions / recommendations at discharge:     Notable Medication Adjustments -   Start taking Amoxil 500 mg every 8 hours starting today for 3 days  Make sure to finish the prescribed course even though if you feel like condition has resolved  Take Tessalon up to 3 times a day as needed for cough  Take Mucinex every 12 hours for 4   You can use over the counter lidocaine patch for flank pain as needed   Testing Required after Discharge -   BMP (blood test), to check your electrolytes  Important follow up information -   Please follow up with your PCP  Other Instructions -   Come back to the hospital if you have worsening of symptoms  Please review this entire after visit summary as additional general instructions including medication list, appointments, activity, diet, any pertinent wound care, and other additional recommendations from your care team that may be provided for you        Sincerely,     Merlene Guzman MD

## 2022-12-24 NOTE — DISCHARGE SUMMARY
[facility]  Discharge- Josephine Garcia 1940, [de-identified] y o  female MRN: [id number]  Unit/Bed#: S -33 Encounter: [id number]  Primary Care Provider: IRAM Cole   Date and time admitted to hospital: 12/21/2022  7:37 PM    * Multifocal pneumonia  Assessment & Plan  Patient presenting with ongoing cough for the past 2 weeks  · Initially had flu-like sxs and was seen by her PCP  Influenza negative, but treated with tamiflu given  tested positive  · Cough initially improved but states worsening of productive cough over past week  · In the ED patient was found to have leukocytosis of 21 32  · FLU/RSV/COVID negative, Procalcitonin WNL x2, Lactic Acid 0 9 D Dimer 0 51  · CT Chest Abdomen Pelvis with contrast performed revealing scattered airspace opacities in the lower lobes  · Urine test for strep pneumo was positive, MRSA culture negative  · Most likely caused by Streptococcus pneumonia  · Patient is feeling much better  Resolving    Plan:  · Has been receiving antibiotic therapy of Ceftriaxone, ID recommendations appreciated  Transitioning to amoxicillin with total antibiotics of 7-days  · Per ID recommendations, the patient will benefit from regular 20 pneumococcal vaccination in a few months  · Tessalon Pearls and Guaifenesin for cough    Bacteremia  Assessment & Plan  · The patient blood culture was positive for coagulase-negative staph, she was initially planned to be started on vancomycin but per ID most likely this has been a contamination because the blood culture was drawn from one site    Repeat blood cultures are negative, the patient will not need    · ID consulted, recommendations appreciated  · Turns out with further review patient had only one blood draw likely inoculated into 2 sets  · No antibiotic needed, per ID vancomycin is discontinued  · Repeat blood culture to confirm absence of bacteremia negative so far    Hypokalemia  Assessment & Plan  POA with HOMAR ponce 3 2, resolved    Abnormal CT scan, gallbladder  Assessment & Plan  CT Chest Abdomen Pelvis: Distended gallbladder with gallstones  Further evaluation with right upper quadrant sonogram was recommended to rule out acute cholecystitis  · Normal AST/ALT Alk Phos  · Denies RUQ abdominal pain, negative mendoza's  · Was evaluated by General Surgery in the ED who reported that there was no current indications for surgical/procedural interventions at this time, and no need for further GB imaging    Flank pain  Assessment & Plan  Left sided flank pain worsens with movement and with pressure for the past 4 days  · Point tenderness along rib  · UA: unremarkable, denies urinary symptoms  · CT Chest Abdomen Pelvis without hydronephrosis or urinary tract calculus  · Improved with lidocaine patch in the ED    Plan:  · Will continue lidocaine patch    Hypertension  Assessment & Plan  History of HTN, home regimen of losartan  Hypertensive with a blood pressure 230/95, was given labetalol in the ED with improvement of pressures    Plan:  · Will continue home meds        Medical Problems     Resolved Problems  Date Reviewed: 12/24/2022   None       Discharging Resident: Jenna Guthrie MD  Discharging Attending: Misty Wilson MD  PCP: Angelika Agosto[address]  Admission Date:   Admission Orders (From admission, onward)     Ordered        12/21/22 2340  INPATIENT ADMISSION  Once                      Discharge Date: 12/25/22    Consultations During Hospital Stay:  · Pharmacy  · Infectious disease    Procedures Performed:   · None    Significant Findings / Test Results:   CT chest abdomen pelvis w contrast   Final Result by Pascale Hough DO (12/21 2593)      Scattered airspace opacities in the lower lobes which may represent infection  Recommend short-term follow-up chest CT scan in 3 months evaluate for resolution  Distended gallbladder with gallstones    Further evaluation with right upper quadrant sonogram is recommended to rule out acute cholecystitis  The study was marked in [facility] for immediate notification  Workstation performed: [id number]         XR chest 1 view portable   Final Result by Josie Rizo MD (12/22 0912)      Faint bibasilar lung opacities  This may represent early pneumonia  Workstation performed: [id number]             Incidental Findings:   · None   · I reviewed the above mentioned incidental findings with the patient and/or family and they expressed understanding  Test Results Pending at Discharge (will require follow up): · Repeat Blood cultures pending final result     Outpatient Tests Requested:  · BMP    Complications:  None    Reason for Admission: Pneumonia    Hospital Course:   Josseline Brooks is a [de-identified] y o  female patient with a PMH of hypertension, hyperlipidemia, GERD who originally presented to the hospital on 12/21/2022 due to chief complaints of cough  Patient reports she developed flu like symptoms 3 days after her  tested positive for influenza  Patient reports she nonproductive cough, sore throat, body aches, rhinorrhea, congestion, fatigue, and diarrhea  She was seen by per PCP on 12/9/22 tested negative for influenza but was treated with tamiflu for presumptive influenza  Patient reports her symptoms initially improved reporting resolution of all symptoms except her cough  Patient notes worsening cough over the last week and states cough became productive with green sputum  Pt stated that she began developing left sided flank pain as well  POA pt described pain as dull 6/10 at its worse, 4/10 at its best  Pain worsens with movement and pressure  Patient reports normal appetite and regular po intake  Denies urinary symptoms, abdominal pain, nausea, vomiting, diarrhea, muscle aches, fevers, chills, headache, shortness of breath, chest pain, lower extremity swelling, or any other associated symptoms at this time       CT chest abdomen and pelvis showed signs of pneumonia otherwise it was unremarkable  The patient's flank pain was managed by the pain that helped  Otherwise she has been doing well  Her blood cultures initially were positive for staph, but turns out blood culture draw was done from 1 site, repeat blood cultures were negative and the patient was cleared from ID for discharge on oral amoxicillin for the total of 7 days  Please see above list of diagnoses and related plan for additional information  Condition at Discharge: good    Discharge Day Visit / Exam:     Subjective: The patient denies any acute symptoms  She said that she is feeling well  Has normal p o  intake and overall feels well  Vitals: Blood Pressure: 163/81 (12/25/22 0700)  Pulse: 78 (12/25/22 0700)  Temperature: 98 7 °F (37 1 °C) (12/25/22 0700)  Temp Source: Oral (12/25/22 0700)  Respirations: 14 (12/25/22 0700)  Weight - Scale: 56 7 kg (125 lb) (12/21/22 1945)  SpO2: 94 % (12/25/22 0700)     Exam:   Physical Exam  Vitals and nursing note reviewed  Constitutional:       General: She is not in acute distress  Appearance: She is well-developed  HENT:      Head: Normocephalic and atraumatic  Eyes:      Conjunctiva/sclera: Conjunctivae normal    Cardiovascular:      Rate and Rhythm: Normal rate and regular rhythm  Heart sounds: No murmur heard  Pulmonary:      Effort: Pulmonary effort is normal  No respiratory distress  Breath sounds: Rhonchi (Much improved) present  Abdominal:      Palpations: Abdomen is soft  Tenderness: There is no abdominal tenderness  Musculoskeletal:         General: No swelling  Cervical back: Neck supple  Skin:     General: Skin is warm and dry  Capillary Refill: Capillary refill takes less than 2 seconds  Neurological:      Mental Status: She is alert  Psychiatric:         Mood and Affect: Mood normal           Discussion with Family: Patient declined call to        Discharge instructions/Information to patient and family:   See after visit summary for information provided to patient and family  Provisions for Follow-Up Care:  See after visit summary for information related to follow-up care and any pertinent home health orders  Disposition:   Home    Planned Readmission: None    Discharge Medications:  See after visit summary for reconciled discharge medications provided to patient and/or family        **Please Note: This note may have been constructed using a voice recognition system**

## 2022-12-25 VITALS
WEIGHT: 125 LBS | SYSTOLIC BLOOD PRESSURE: 163 MMHG | OXYGEN SATURATION: 94 % | HEART RATE: 78 BPM | TEMPERATURE: 98.7 F | RESPIRATION RATE: 14 BRPM | DIASTOLIC BLOOD PRESSURE: 81 MMHG | BODY MASS INDEX: 22.86 KG/M2

## 2022-12-25 LAB
BACTERIA BLD CULT: ABNORMAL
BACTERIA BLD CULT: ABNORMAL
GRAM STN SPEC: ABNORMAL
GRAM STN SPEC: ABNORMAL
S AUREUS+CONS DNA BLD POS NAA+NON-PROBE: DETECTED

## 2022-12-25 PROCEDURE — 99239 HOSP IP/OBS DSCHRG MGMT >30: CPT | Performed by: INTERNAL MEDICINE

## 2022-12-25 RX ORDER — BENZONATATE 200 MG/1
200 CAPSULE ORAL 3 TIMES DAILY PRN
Qty: 20 CAPSULE | Refills: 0 | Status: SHIPPED | OUTPATIENT
Start: 2022-12-25 | End: 2023-01-05

## 2022-12-25 RX ORDER — POTASSIUM CHLORIDE 1500 MG/1
40 TABLET, EXTENDED RELEASE ORAL ONCE
Status: COMPLETED | OUTPATIENT
Start: 2022-12-25 | End: 2022-12-25

## 2022-12-25 RX ORDER — GUAIFENESIN 600 MG/1
600 TABLET, EXTENDED RELEASE ORAL EVERY 12 HOURS SCHEDULED
Qty: 8 TABLET | Refills: 0 | Status: SHIPPED | OUTPATIENT
Start: 2022-12-25 | End: 2023-01-05 | Stop reason: ALTCHOICE

## 2022-12-25 RX ORDER — AMOXICILLIN 500 MG/1
500 CAPSULE ORAL EVERY 8 HOURS SCHEDULED
Qty: 9 CAPSULE | Refills: 0 | Status: SHIPPED | OUTPATIENT
Start: 2022-12-25 | End: 2022-12-28

## 2022-12-25 RX ADMIN — PANTOPRAZOLE SODIUM 40 MG: 40 TABLET, DELAYED RELEASE ORAL at 09:06

## 2022-12-25 RX ADMIN — POTASSIUM CHLORIDE 40 MEQ: 1500 TABLET, EXTENDED RELEASE ORAL at 07:52

## 2022-12-25 RX ADMIN — BENZONATATE 200 MG: 100 CAPSULE ORAL at 09:06

## 2022-12-25 RX ADMIN — GUAIFENESIN 600 MG: 600 TABLET, EXTENDED RELEASE ORAL at 09:06

## 2022-12-25 RX ADMIN — ENOXAPARIN SODIUM 40 MG: 40 INJECTION SUBCUTANEOUS at 09:06

## 2022-12-25 RX ADMIN — LOSARTAN POTASSIUM 100 MG: 50 TABLET, FILM COATED ORAL at 09:06

## 2022-12-25 NOTE — RESULT ENCOUNTER NOTE
Was treated with Zithromax and Rocephin for bilateral pneumonia  Blood culture results were reviewed that were positive for Staph hominis  This culture is sensitive to cefazolin  Patient was admitted  No further action required

## 2022-12-27 ENCOUNTER — TRANSITIONAL CARE MANAGEMENT (OUTPATIENT)
Dept: FAMILY MEDICINE CLINIC | Facility: CLINIC | Age: 82
End: 2022-12-27

## 2022-12-28 ENCOUNTER — RA CDI HCC (OUTPATIENT)
Dept: OTHER | Facility: HOSPITAL | Age: 82
End: 2022-12-28

## 2022-12-28 LAB
BACTERIA BLD CULT: NORMAL
BACTERIA BLD CULT: NORMAL

## 2022-12-28 NOTE — PROGRESS NOTES
[facility]  coding opportunities       Chart reviewed, no opportunity found:   [address]        Patients Insurance     Medicare Insurance: [employer] [facility]

## 2022-12-29 ENCOUNTER — OFFICE VISIT (OUTPATIENT)
Dept: FAMILY MEDICINE CLINIC | Facility: CLINIC | Age: 82
End: 2022-12-29

## 2022-12-29 DIAGNOSIS — J18.9 MULTIFOCAL PNEUMONIA: Primary | ICD-10-CM

## 2022-12-29 NOTE — PROGRESS NOTES
Assessment & Plan     1  Multifocal pneumonia  -     CT chest wo contrast; Future; Expected date: 12/29/2022    cont to monitor call with any problems  Resolving and improving       Subjective     Transitional Care Management Review:   Afshin Mirza is a [de-identified] y o  female here for TCM follow up  During the TCM phone call patient stated:  TCM Call     Date and time call was made  12/27/2022  2:21 PM    Patient was hospitialized at  [address] [place] [address]    Date of Admission  12/21/22    Date of discharge  12/25/22    Diagnosis  multifocal pneumonia    Disposition  Home    Were the patients medications reviewed and updated  No      TCM Call     Should patient be enrolled in anticoag monitoring? No    Scheduled for follow up? Yes    Did you obtain your prescribed medications  Yes    Do you need help managing your prescriptions or medications  No    Is transportation to your appointment needed  No    I have advised the patient to call PCP with any new or worsening symptoms  Ann Daigle,     Living Arrangements  Spouse or Significiant other    Are you recieving any outpatient services  No    Are you recieving home care services  No    Are you using any community resources  No    Current waiver services  No    Have you fallen in the last 12 months  No    Interperter language line needed  No        Here for hospital follow up for pneumonia following influenza  Having some chest wall pain  No sob  Some fatigue  Rare cough now  No fevers  Never had fever with illness      Review of Systems   Constitutional: Negative for fatigue and fever  HENT: Negative for congestion, postnasal drip and rhinorrhea  Eyes: Negative for photophobia and visual disturbance  Respiratory: Negative for cough, shortness of breath and wheezing  Cardiovascular: Negative for chest pain and palpitations  Gastrointestinal: Negative for constipation, diarrhea, nausea and vomiting  Genitourinary: Negative for dysuria and frequency  Musculoskeletal: Negative for arthralgias and myalgias  Skin: Negative for rash  Neurological: Negative for dizziness, light-headedness and headaches  Hematological: Negative for adenopathy  Psychiatric/Behavioral: Negative for dysphoric mood and sleep disturbance  The patient is not nervous/anxious  Objective     /80   Pulse 79   Temp (!) 97 1 °F (36 2 °C)   Resp 16   Ht 5' 2" (1 575 m)   Wt 53 8 kg (118 lb 9 6 oz)   LMP 07/22/1986 (LMP Unknown) Comment: hysterectomy 30 years ago  SpO2 97%   BMI 21 69 kg/m²      Physical Exam  Vitals and nursing note reviewed  Constitutional:       Appearance: Normal appearance  HENT:      Head: Normocephalic and atraumatic  Right Ear: Tympanic membrane, ear canal and external ear normal       Left Ear: Tympanic membrane, ear canal and external ear normal       Nose: Nose normal       Mouth/Throat:      Mouth: Mucous membranes are moist    Eyes:      Extraocular Movements: Extraocular movements intact  Conjunctiva/sclera: Conjunctivae normal       Pupils: Pupils are equal, round, and reactive to light  Cardiovascular:      Rate and Rhythm: Normal rate and regular rhythm  Pulses: Normal pulses  Heart sounds: Normal heart sounds  Pulmonary:      Effort: Pulmonary effort is normal       Breath sounds: Normal breath sounds  Abdominal:      General: Bowel sounds are normal    Musculoskeletal:         General: Normal range of motion  Cervical back: Normal range of motion and neck supple  Skin:     General: Skin is warm  Capillary Refill: Capillary refill takes less than 2 seconds  Neurological:      General: No focal deficit present  Mental Status: She is alert and oriented to person, place, and time  Psychiatric:         Mood and Affect: Mood normal          Behavior: Behavior normal          Thought Content:  Thought content normal          Judgment: Judgment normal        Medications have been reviewed by provider in current encounter    Alexandre Langford, [address]

## 2022-12-31 VITALS
SYSTOLIC BLOOD PRESSURE: 138 MMHG | HEIGHT: 62 IN | RESPIRATION RATE: 16 BRPM | TEMPERATURE: 97.1 F | HEART RATE: 79 BPM | DIASTOLIC BLOOD PRESSURE: 80 MMHG | BODY MASS INDEX: 21.83 KG/M2 | WEIGHT: 118.6 LBS | OXYGEN SATURATION: 97 %

## 2023-01-04 ENCOUNTER — TELEPHONE (OUTPATIENT)
Dept: FAMILY MEDICINE CLINIC | Facility: CLINIC | Age: 83
End: 2023-01-04

## 2023-01-04 NOTE — TELEPHONE ENCOUNTER
Patient having pain in left rib area and back due to having pneumonia and coughing so much  She wants an OV with you sooner than later   Anywhere you are able to squeeze her in?/    Please advise

## 2023-01-05 ENCOUNTER — OFFICE VISIT (OUTPATIENT)
Dept: FAMILY MEDICINE CLINIC | Facility: CLINIC | Age: 83
End: 2023-01-05

## 2023-01-05 VITALS
DIASTOLIC BLOOD PRESSURE: 82 MMHG | BODY MASS INDEX: 21.83 KG/M2 | RESPIRATION RATE: 16 BRPM | TEMPERATURE: 97.1 F | WEIGHT: 118.6 LBS | OXYGEN SATURATION: 97 % | HEIGHT: 62 IN | HEART RATE: 76 BPM | SYSTOLIC BLOOD PRESSURE: 160 MMHG

## 2023-01-05 DIAGNOSIS — R05.2 SUBACUTE COUGH: ICD-10-CM

## 2023-01-05 DIAGNOSIS — R07.81 PLEURITIC CHEST PAIN: Primary | ICD-10-CM

## 2023-01-05 DIAGNOSIS — I10 ESSENTIAL HYPERTENSION: ICD-10-CM

## 2023-01-05 PROBLEM — J18.9 MULTIFOCAL PNEUMONIA: Status: RESOLVED | Noted: 2022-12-22 | Resolved: 2023-01-05

## 2023-01-05 PROBLEM — E87.6 HYPOKALEMIA: Status: RESOLVED | Noted: 2022-12-22 | Resolved: 2023-01-05

## 2023-01-05 PROBLEM — J11.1 INFLUENZA: Status: RESOLVED | Noted: 2022-12-09 | Resolved: 2023-01-05

## 2023-01-05 PROBLEM — R20.0 LEFT FACIAL NUMBNESS: Status: RESOLVED | Noted: 2022-11-16 | Resolved: 2023-01-05

## 2023-01-05 PROBLEM — R78.81 BACTEREMIA: Status: RESOLVED | Noted: 2022-12-23 | Resolved: 2023-01-05

## 2023-01-05 PROBLEM — R10.9 FLANK PAIN: Status: RESOLVED | Noted: 2022-12-22 | Resolved: 2023-01-05

## 2023-01-05 RX ORDER — PREDNISONE 10 MG/1
TABLET ORAL
Qty: 18 TABLET | Refills: 0 | Status: SHIPPED | OUTPATIENT
Start: 2023-01-05 | End: 2023-01-05 | Stop reason: SDUPTHER

## 2023-01-05 RX ORDER — AMLODIPINE BESYLATE 5 MG/1
5 TABLET ORAL DAILY
Qty: 90 TABLET | Refills: 3 | Status: SHIPPED | OUTPATIENT
Start: 2023-01-05

## 2023-01-05 RX ORDER — PREDNISONE 10 MG/1
TABLET ORAL
Qty: 18 TABLET | Refills: 0 | Status: SHIPPED | OUTPATIENT
Start: 2023-01-05

## 2023-01-05 NOTE — PROGRESS NOTES
FAMILY PRACTICE OFFICE VISIT       NAME: Marychuy Andrade  AGE: [de-identified] y o  SEX: female       : 1940        MRN: [id number]    DATE: 2023  TIME: 12:45 PM    Assessment and Plan   1  Pleuritic chest pain  -     predniSONE 10 mg tablet; Take 3tabs daily for 3days then 2t daily for 3d then 1t daily for 3d    2  Subacute cough  -     predniSONE 10 mg tablet; Take 3tabs daily for 3days then 2t daily for 3d then 1t daily for 3d    3  Essential hypertension  -     amLODIPine (NORVASC) 5 mg tablet; Take 1 tablet (5 mg total) by mouth daily                 Chief Complaint     Chief Complaint   Patient presents with   • Cough     Patient is being seen for chest pain and a lingering cough  • Chest Pain       History of Present Illness   Marychuy Andrade is a [de-identified]y o -year-old female who is here due to chest wall pain from cough  Hx of pneumonia and influenza recently  Improving  Chest wall pain only with cough  No sob or vasquez  No fevers  No rash  Using tylenol with some relief      Review of Systems   Review of Systems   Constitutional: Negative for appetite change, fatigue, fever and unexpected weight change  Respiratory: Positive for cough  Negative for chest tightness, shortness of breath and wheezing  Cardiovascular: Positive for chest pain  Negative for palpitations  Gastrointestinal: Negative for constipation, diarrhea, nausea and vomiting  Musculoskeletal: Negative for arthralgias and myalgias  Skin: Negative for rash  Neurological: Negative for dizziness, light-headedness and headaches  Hematological: Negative for adenopathy  Psychiatric/Behavioral: Negative for dysphoric mood and sleep disturbance  The patient is not nervous/anxious          Active Problem List     Patient Active Problem List   Diagnosis   • Acid reflux disease   • Essential hypertension   • Hyperlipidemia   • Spondylosis of cervical region without myelopathy or radiculopathy   • Thyroid nodule   • Vitamin D deficiency   • H/O cataract removal with insertion of prosthetic lens   • Benign essential tremor   • Trigeminal neuropathy   • Myogenic ptosis, left   • Abnormal CT scan, gallbladder         Past Medical History:  Past Medical History:   Diagnosis Date   • GERD (gastroesophageal reflux disease)    • Hyperlipidemia    • Hypertension    • Pneumonia    • Spinal stenosis    • Trapezius muscle spasm     last assessed - [id number]       Past Surgical History:  Past Surgical History:   Procedure Laterality Date   • BREAST CYST ASPIRATION Right 2010   • CATARACT EXTRACTION, BILATERAL     • DEBRIDEMENT TENNIS ELBOW     • ELBOW SURGERY Right     tennis elbow   • FASCIOTOMY ELBOW Right     of the lateral epicondyle; last assessed - 10Oct2014   • HYSTERECTOMY      WIN-BSO   • OOPHORECTOMY Bilateral    • WA COLONOSCOPY FLX DX W/COLLJ SPEC WHEN PFRMD N/A 05/01/2017    Procedure: EGD AND COLONOSCOPY;  Surgeon: Otilio Stafford MD;  Location: [facility];   Service: Gastroenterology       Family History:  Family History   Problem Relation Age of Onset   • Cancer Sister    • Colon cancer Sister 71   • Diabetes Sister    • Melanoma Mother    • Cirrhosis Father         alcoholic   • Stroke Father         CVA (cerebral vascular accident)   • Arrhythmia Father         pacemaker placement   • No Known Problems Maternal Grandmother    • No Known Problems Maternal Grandfather    • No Known Problems Paternal Grandmother    • No Known Problems Paternal Grandfather    • No Known Problems Sister    • No Known Problems Sister    • No Known Problems Sister    • No Known Problems Sister    • No Known Problems Maternal Aunt    • No Known Problems Maternal Aunt    • No Known Problems Maternal Aunt    • No Known Problems Maternal Aunt    • No Known Problems Maternal Aunt    • No Known Problems Maternal Aunt    • No Known Problems Paternal Aunt    • No Known Problems Paternal Aunt    • No Known Problems Paternal Aunt        Social History:  Social History Socioeconomic History   • Marital status: /Civil Union     Spouse name: Not on file   • Number of children: 2   • Years of education: Not on file   • Highest education level: Not on file   Occupational History   • Occupation: Retired -  - [employer]   Tobacco Use   • Smoking status: Former     Packs/day: 0 50     Types: Cigarettes     Start date: 1962   • Smokeless tobacco: Former   • Tobacco comments:     Quit June 2014 - 1/2 ppd for 40 years per [employer]   Vaping Use   • Vaping Use: Never used   Substance and Sexual Activity   • Alcohol use: No     Comment: Consumes alcohol occasionally per [employer]   • Drug use: No   • Sexual activity: Not Currently   Other Topics Concern   • Not on file   Social History Narrative    Two children - [address]     Social Determinants of Health     Financial Resource Strain: Low Risk    • Difficulty of Paying Living Expenses: Not hard at all   Food Insecurity: Not on file   Transportation Needs: No Transportation Needs   • Lack of Transportation (Medical): No   • Lack of Transportation (Non-Medical): No   Physical Activity: Not on file   Stress: Not on file   Social Connections: Not on file   Intimate Partner Violence: Not on file   Housing Stability: Not on file       Objective     Vitals:    01/05/23 1200   BP: 160/82   Pulse: 76   Resp: 16   Temp: (!) 97 1 °F (36 2 °C)   SpO2: 97%     Wt Readings from Last 3 Encounters:   01/05/23 53 8 kg (118 lb 9 6 oz)   12/29/22 53 8 kg (118 lb 9 6 oz)   12/21/22 56 7 kg (125 lb)       Physical Exam  Vitals and nursing note reviewed  Constitutional:       Appearance: She is well-developed  HENT:      Head: Normocephalic and atraumatic  Eyes:      Conjunctiva/sclera: Conjunctivae normal    Cardiovascular:      Rate and Rhythm: Normal rate and regular rhythm  Pulses: Normal pulses  Heart sounds: Normal heart sounds     Pulmonary:      Effort: Pulmonary effort is normal       Breath sounds: Normal breath sounds  Musculoskeletal:         General: Normal range of motion  Cervical back: Normal range of motion  Comments: Chest wall pain anterior and posterior to palpation     Skin:     General: Skin is warm and dry  Capillary Refill: Capillary refill takes less than 2 seconds  Neurological:      General: No focal deficit present  Mental Status: She is alert and oriented to person, place, and time  Psychiatric:         Mood and Affect: Mood normal          Behavior: Behavior normal          Thought Content:  Thought content normal          Judgment: Judgment normal          Pertinent Laboratory/Diagnostic Studies:  Lab Results   Component Value Date    GLUCOSE 178 (H) 09/16/2016    BUN 12 12/23/2022    CREATININE 0 58 (L) 12/23/2022    CALCIUM 8 7 12/23/2022     10/11/2016    K 3 6 12/23/2022    CO2 26 12/23/2022     12/23/2022     Lab Results   Component Value Date    ALT 7 12/23/2022    AST 12 (L) 12/23/2022    ALKPHOS 83 12/23/2022    BILITOT 0 4 10/11/2016       Lab Results   Component Value Date    WBC 6 54 12/23/2022    HGB 12 1 12/23/2022    HCT 36 0 12/23/2022    MCV 97 12/23/2022     (H) 12/23/2022       No results found for: TSH    Lab Results   Component Value Date    CHOL 251 (H) 10/11/2016     Lab Results   Component Value Date    TRIG 98 08/03/2022     Lab Results   Component Value Date    HDL 78 08/03/2022     Lab Results   Component Value Date    LDLCALC 128 (H) 08/03/2022     No results found for: HGBA1C    Results for orders placed or performed during the hospital encounter of 12/21/22   Blood culture #1    Specimen: Arm, Right; Blood   Result Value Ref Range    Blood Culture Staphylococcus hominis (A)     Gram Stain Result Gram positive cocci in clusters (A)        Susceptibility    Staphylococcus hominis - TOMMY     ZID Performed Yes       Ampicillin ($$) >8 00 Resistant ug/ml     Cefazolin ($) <=4 00 Susceptible ug/ml     Clindamycin ($) <=0 25 Susceptible ug/ml     Erythromycin ($$$$) >4 00 Resistant ug/ml     Gentamicin ($$) <=1 Susceptible ug/ml     Oxacillin <=0 25 Susceptible ug/ml     Tetracycline <=2 Susceptible ug/ml     Trimethoprim + Sulfamethoxazole ($$$) <=0 5/9 5 Susceptible ug/ml     Vancomycin ($) 1 00 Susceptible ug/ml   Blood culture #2    Specimen: Arm, Right; Blood   Result Value Ref Range    Blood Culture Staphylococcus hominis (A)     Gram Stain Result Gram positive cocci in clusters (A)        Susceptibility    Staphylococcus hominis - TOMMY     ZID Performed Yes     FLU/RSV/COVID - if FLU/RSV clinically relevant    Specimen: Nose; Nares   Result Value Ref Range    SARS-CoV-2 Negative Negative    INFLUENZA A PCR Negative Negative    INFLUENZA B PCR Negative Negative    RSV PCR Negative Negative   MRSA culture    Specimen: Nose; Nares   Result Value Ref Range    MRSA Culture Only       No Methicillin Resistant Staphlyococcus aureus (MRSA) isolated   Strep Pneumoniae, Urine    Specimen: Urine, Other   Result Value Ref Range    Strep pneumoniae antigen, urine Positive (A) Negative   Legionella antigen, urine    Specimen: Urine   Result Value Ref Range    Legionella Urinary Antigen Negative Negative   Sputum culture and Gram stain    Specimen: Expectorated Sputum   Result Value Ref Range    Sputum Culture 2+ Growth of     Gram Stain Result 2+ Epithelial cells per low power field (A)     Gram Stain Result 2+ Gram negative rods (A)     Gram Stain Result 2+ Gram positive cocci in pairs and chains (A)     Gram Stain Result Rare Yeast (A)     Gram Stain Result Rare Polys (A)    Blood Culture Identification Panel    Specimen: Arm, Right; Blood   Result Value Ref Range    Staphylococcus Detected (A) Not Detected   Blood culture    Specimen: Arm, Left; Blood   Result Value Ref Range    Blood Culture No Growth After 5 Days  Blood culture    Specimen: Arm, Right; Blood   Result Value Ref Range    Blood Culture No Growth After 5 Days      CBC and differential   Result Value Ref Range    WBC 21 32 (H) 4 31 - 10 16 Thousand/uL    RBC 4 07 3 81 - 5 12 Million/uL    Hemoglobin 12 9 11 5 - 15 4 g/dL    Hematocrit 38 7 34 8 - 46 1 %    MCV 95 82 - 98 fL    MCH 31 7 26 8 - 34 3 pg    MCHC 33 3 31 4 - 37 4 g/dL    RDW 13 2 11 6 - 15 1 %    MPV 8 8 (L) 8 9 - 12 7 fL    Platelets 162 (H) 438 - 390 Thousands/uL    nRBC 0 /100 WBCs    Neutrophils Relative 88 (H) 43 - 75 %    Immat GRANS % 1 0 - 2 %    Lymphocytes Relative 4 (L) 14 - 44 %    Monocytes Relative 5 4 - 12 %    Eosinophils Relative 2 0 - 6 %    Basophils Relative 0 0 - 1 %    Neutrophils Absolute 19 09 (H) 1 85 - 7 62 Thousands/µL    Immature Grans Absolute 0 12 0 00 - 0 20 Thousand/uL    Lymphocytes Absolute 0 77 0 60 - 4 47 Thousands/µL    Monocytes Absolute 0 97 0 17 - 1 22 Thousand/µL    Eosinophils Absolute 0 31 0 00 - 0 61 Thousand/µL    Basophils Absolute 0 06 0 00 - 0 10 Thousands/µL   Comprehensive metabolic panel   Result Value Ref Range    Sodium 140 135 - 147 mmol/L    Potassium 3 2 (L) 3 5 - 5 3 mmol/L    Chloride 104 96 - 108 mmol/L    CO2 26 21 - 32 mmol/L    ANION GAP 10 4 - 13 mmol/L    BUN 11 5 - 25 mg/dL    Creatinine 0 54 (L) 0 60 - 1 30 mg/dL    Glucose 112 65 - 140 mg/dL    Calcium 8 9 8 4 - 10 2 mg/dL    AST 14 13 - 39 U/L    ALT 9 7 - 52 U/L    Alkaline Phosphatase 102 34 - 104 U/L    Total Protein 7 0 6 4 - 8 4 g/dL    Albumin 3 6 3 5 - 5 0 g/dL    Total Bilirubin 0 63 0 20 - 1 00 mg/dL    eGFR 89 ml/min/1 73sq m   HS Troponin 0hr (reflex protocol)   Result Value Ref Range    hs TnI 0hr 14 "Refer to ACS Flowchart"- see link ng/L   Lactic acid   Result Value Ref Range    LACTIC ACID 0 9 0 5 - 2 0 mmol/L   UA w Reflex to Microscopic w Reflex to Culture    Specimen: Urine, Clean Catch   Result Value Ref Range    Color, UA Light Yellow     Clarity, UA Clear     Specific [place], UA 1 013 1 003 - 1 030    pH, UA 7 0 4 5, 5 0, 5 5, 6 0, 6 5, 7 0, 7 5, 8 0    Leukocytes, UA Negative Negative    Nitrite, UA Negative Negative    Protein, UA Trace (A) Negative mg/dl    Glucose, UA Negative Negative mg/dl    Ketones, UA 10 (1+) (A) Negative mg/dl    Urobilinogen, UA <2 0 <2 0 mg/dl mg/dl    Bilirubin, UA Negative Negative    Occult Blood, UA Negative Negative   HS Troponin I 2hr   Result Value Ref Range    hs TnI 2hr 16 "Refer to ACS Flowchart"- see link ng/L    Delta 2hr hsTnI 2 <20 ng/L   HS Troponin I 4hr   Result Value Ref Range    hs TnI 4hr 13 "Refer to ACS Flowchart"- see link ng/L    Delta 4hr hsTnI -1 <20 ng/L   D-dimer, quantitative   Result Value Ref Range    D-Dimer, Quant 0 51 (H) <0 50 ug/ml FEU   Procalcitonin   Result Value Ref Range    Procalcitonin 0 09 <=0 25 ng/ml   Protime-INR   Result Value Ref Range    Protime 13 5 11 6 - 14 5 seconds    INR 1 01 0 84 - 1 19   APTT   Result Value Ref Range    PTT 31 23 - 37 seconds   Urine Microscopic   Result Value Ref Range    RBC, UA 1-2 None Seen, 1-2 /hpf    WBC, UA None Seen None Seen, 1-2 /hpf    Epithelial Cells None Seen None Seen, Occasional /hpf    Bacteria, UA None Seen None Seen, Occasional /hpf   Platelet count   Result Value Ref Range    Platelets 624 (H) 093 - 390 Thousands/uL    MPV 8 9 8 9 - 12 7 fL   Procalcitonin   Result Value Ref Range    Procalcitonin 0 10 <=0 25 ng/ml   CBC and differential, AM Draw, Tomorrow   Result Value Ref Range    WBC 6 54 4 31 - 10 16 Thousand/uL    RBC 3 71 (L) 3 81 - 5 12 Million/uL    Hemoglobin 12 1 11 5 - 15 4 g/dL    Hematocrit 36 0 34 8 - 46 1 %    MCV 97 82 - 98 fL    MCH 32 6 26 8 - 34 3 pg    MCHC 33 6 31 4 - 37 4 g/dL    RDW 13 7 11 6 - 15 1 %    MPV 9 4 8 9 - 12 7 fL    Platelets 720 (H) 247 - 390 Thousands/uL    nRBC 0 /100 WBCs    Neutrophils Relative 68 43 - 75 %    Immat GRANS % 0 0 - 2 %    Lymphocytes Relative 20 14 - 44 %    Monocytes Relative 11 4 - 12 %    Eosinophils Relative 0 0 - 6 %    Basophils Relative 1 0 - 1 %    Neutrophils Absolute 4 48 1 85 - 7 62 Thousands/µL Immature Grans Absolute 0 02 0 00 - 0 20 Thousand/uL    Lymphocytes Absolute 1 28 0 60 - 4 47 Thousands/µL    Monocytes Absolute 0 72 0 17 - 1 22 Thousand/µL    Eosinophils Absolute 0 01 0 00 - 0 61 Thousand/µL    Basophils Absolute 0 03 0 00 - 0 10 Thousands/µL   Comprehensive metabolic panel, AM Draw, Tomorrow   Result Value Ref Range    Sodium 140 135 - 147 mmol/L    Potassium 3 6 3 5 - 5 3 mmol/L    Chloride 106 96 - 108 mmol/L    CO2 26 21 - 32 mmol/L    ANION GAP 8 4 - 13 mmol/L    BUN 12 5 - 25 mg/dL    Creatinine 0 58 (L) 0 60 - 1 30 mg/dL    Glucose 85 65 - 140 mg/dL    Calcium 8 7 8 4 - 10 2 mg/dL    Corrected Calcium 9 3 8 3 - 10 1 mg/dL    AST 12 (L) 13 - 39 U/L    ALT 7 7 - 52 U/L    Alkaline Phosphatase 83 34 - 104 U/L    Total Protein 6 2 (L) 6 4 - 8 4 g/dL    Albumin 3 2 (L) 3 5 - 5 0 g/dL    Total Bilirubin 0 48 0 20 - 1 00 mg/dL    eGFR 87 ml/min/1 73sq m   Procalcitonin   Result Value Ref Range    Procalcitonin 0 06 <=0 25 ng/ml   ECG 12 lead   Result Value Ref Range    Ventricular Rate 84 BPM    Atrial Rate 84 BPM    WA Interval 110 ms    QRSD Interval 78 ms    QT Interval 360 ms    QTC Interval 425 ms    P Axis 73 degrees    QRS Axis 57 degrees    T Wave Axis 45 degrees       No orders of the defined types were placed in this encounter  ALLERGIES:  Allergies   Allergen Reactions   • Atorvastatin Edema   • Other      Cholesterol med- name unknown and pneumonia vaccine   • Pneumococcal Vaccines Edema       Current Medications     Current Outpatient Medications   Medication Sig Dispense Refill   • amLODIPine (NORVASC) 5 mg tablet Take 1 tablet (5 mg total) by mouth daily 90 tablet 3   • Cholecalciferol (VITAMIN D) 2000 UNITS CAPS Take by mouth       • Glycerin-Hypromellose- (DRY EYE RELIEF DROPS OP) Apply to eye     • losartan (COZAAR) 100 MG tablet Take 1 tablet (100 mg total) by mouth daily 90 tablet 3   • omeprazole (PriLOSEC) 40 MG capsule Take 1 capsule (40 mg total) by mouth daily 90 capsule 3   • predniSONE 10 mg tablet Take 3tabs daily for 3days then 2t daily for 3d then 1t daily for 3d 18 tablet 0     No current facility-administered medications for this visit           Health Maintenance     Health Maintenance   Topic Date Due   • SLP PLAN OF CARE  Never done   • Osteoporosis Screening  Never done   • COVID-19 Vaccine (5 - Booster for Pfizer series) 10/18/2022   • Hepatitis B Vaccine (1 of 3 - 3-dose series) 02/05/2023 (Originally 1940)   • Pneumococcal Vaccine: 65+ Years (2 - PCV) 07/05/2032 (Originally 10/12/2011)   • Fall Risk  08/09/2023   • Urinary Incontinence Screening  08/09/2023   • Medicare Annual Wellness Visit (AWV)  08/09/2023   • Depression Screening  01/05/2024   • BMI: Adult  01/05/2024   • Breast Cancer Screening: Mammogram  09/09/2024   • Hepatitis C Screening  Completed   • Influenza Vaccine  Completed   • HIB Vaccine  Aged Out   • IPV Vaccine  Aged Out   • Hepatitis A Vaccine  Aged Out   • Meningococcal ACWY Vaccine  Aged Out   • HPV Vaccine  Aged Out   • Colorectal Cancer Screening  Discontinued     Immunization History   Administered Date(s) Administered   • COVID-19 PFIZER VACCINE 0 3 ML IM 01/21/2021, 02/09/2021, 10/23/2021   • COVID-19 Pfizer vac (Jonnathan-sucrose, gray cap) 12 yr+ IM 08/23/2022   • COVID-19, unspecified 08/23/2022   • INFLUENZA 10/25/2016, 10/24/2017, 11/01/2018, 11/24/2018, 10/24/2019, 11/09/2021, 10/24/2022   • Influenza Split High Dose Preservative Free IM 10/10/2014, 10/13/2015, 10/25/2016, 11/01/2018, 11/07/2019   • Influenza, high dose seasonal 0 7 mL 10/24/2019, 10/24/2022   • Influenza, recombinant, quadrivalent,injectable, preservative free 11/03/2020   • Influenza, seasonal, injectable 10/08/2013   • Influenza, seasonal, injectable, preservative free 11/24/2018   • Pneumococcal Polysaccharide PPV23 10/12/2010   • Zoster 05/31/2017       Depression Screening and Follow-up Plan: Patient was screened for depression during today's encounter  They screened negative with a PHQ-2 score of 0          IRAM Schrader

## 2023-01-22 NOTE — PROGRESS NOTES
Problem: Knowledge Deficit - Standard  Goal: Patient and family/care givers will demonstrate understanding of plan of care, disease process/condition, diagnostic tests and medications  Outcome: Progressing     Problem: Skin Integrity  Goal: Skin integrity is maintained or improved  Outcome: Progressing     Problem: Pain - Standard  Goal: Alleviation of pain or a reduction in pain to the patient’s comfort goal  Outcome: Progressing   The patient is Stable - Low risk of patient condition declining or worsening    Shift Goals  Clinical Goals: prepare for discharge  Patient Goals: transfer to [facility]  Family Goals: N/A    Progress made toward(s) clinical / shift goals:  patient prepared to transfer to [facility].    Patient is not progressing towards the following goals:       Patient ID: Brandy Cervantes is a 80 y o  female  Assessment/Plan:           Problem List Items Addressed This Visit        Nervous and Auditory    Benign essential tremor    Relevant Orders    MRI brain and orbits wo and w contrast    Trigeminal neuropathy - Primary       Other    Left facial numbness    Relevant Orders    MRI brain and orbits wo and w contrast    Vitamin B12    FLAKITO and PE,Serum    Sedimentation rate, automated    BUN    Creatinine, serum    H/O cataract removal with insertion of prosthetic lens    Relevant Orders    MRI brain and orbits wo and w contrast    Myogenic ptosis, left      Mrs Kenna Gary has presented to  [address] [place] [address] for evaluation of left forehead and left eyebrow numbness  Patient described no facial pain, no signs of neuralgia, but likely trigeminal neuropathy  Patient has no vision loss, no double vision, no eye pain  No other focal neurologic dysfunction reported; Patient has intermittent events of transient left eye ptosis , non-concerning, but MRI brain and orbits will be advised  Patient has signs of head tremor with concern for benign essential tremor  Patient does have hyperreflexia with known history of anxiety disorder  Patient will be offered several serological test and based on the findings may require additional workup  Patient has no focal sensory motor dysfunction, ambulatory dysfunction is intact  Patient is to follow with [facility] after imaging completed and available for my review  Subjective: left facial numbness, eye ptosis, tremor    HPI  Mrs Kenna Gary is a 79-year-old female who presented to [facility] multiple [address] for evaluation of facial pain  Patient has no imaging of the brain available for my review;   Patient was referred by [facility] Dr Bharati Santoro    Patient stated she had cataract removal and lens placement on January 31st since then she reports having sensory dysfunction in her left eyebrow  Patient is using pencil for her eyebrow and she feels numb  Patient also states that her left upper lid does not open all the way and at night she has moments when she has challenging open her left eye  Patient reports no permanent ptosis or double vision or vision loss  Patient also described having sensation in the lateral part of her lower lids were critical   Patient describes no other neurological complaints  Patient has no dysarthria, dysphagia,  No dizziness, no vertigo, no worrisome full  Patient had surgical intervention her left side of the face for skin cancer removal   Patient also mentioned her daughter-in-law and her son knows it is the patient had tremor, no head tremor or voice tremor has been described, no bony problem has been noted  The following portions of the patient's history were reviewed and updated as appropriate: She  has a past medical history of GERD (gastroesophageal reflux disease), Hyperlipidemia, Hypertension, Spinal stenosis, and Trapezius muscle spasm  She   Patient Active Problem List    Diagnosis Date Noted   • Left facial numbness 11/16/2022   • H/O cataract removal with insertion of prosthetic lens 11/16/2022   • Benign essential tremor 11/16/2022   • Trigeminal neuropathy 11/16/2022   • Myogenic ptosis, left 11/16/2022   • Acid reflux disease 02/27/2017   • Essential hypertension 10/25/2016   • Spondylosis of cervical region without myelopathy or radiculopathy 07/29/2016   • Thyroid nodule 10/10/2014   • Hyperlipidemia 10/04/2012   • Vitamin D deficiency 10/04/2012     She  has a past surgical history that includes Hysterectomy; Debridement tennis elbow; Elbow surgery (Right); pr colonoscopy flx dx w/collj spec when pfrmd (N/A, 05/01/2017); Fasciotomy elbow (Right); Oophorectomy (Bilateral);  Breast cyst aspiration (Right, 2010); and Cataract extraction, bilateral   Her family history includes Arrhythmia in her father; Cancer in her sister; Cirrhosis in her father; Colon cancer (age of onset: 71) in her sister; Diabetes in her sister; Melanoma in her mother; No Known Problems in her maternal aunt, maternal aunt, maternal aunt, maternal aunt, maternal aunt, maternal aunt, maternal grandfather, maternal grandmother, paternal aunt, paternal aunt, paternal aunt, paternal grandfather, paternal grandmother, sister, sister, sister, and sister; Stroke in her father  She  reports that she has been smoking  She started smoking about 60 years ago  She has been smoking an average of  5 packs per day  She has quit using smokeless tobacco  She reports that she does not drink alcohol and does not use drugs  Current Outpatient Medications   Medication Sig Dispense Refill   • Cholecalciferol (VITAMIN D) 2000 UNITS CAPS Take by mouth  • Glycerin-Hypromellose- (DRY EYE RELIEF DROPS OP) Apply to eye     • losartan (COZAAR) 100 MG tablet Take 1 tablet (100 mg total) by mouth daily 90 tablet 3   • omeprazole (PriLOSEC) 40 MG capsule Take 1 capsule (40 mg total) by mouth daily 90 capsule 3     No current facility-administered medications for this visit  Current Outpatient Medications on File Prior to Visit   Medication Sig   • Cholecalciferol (VITAMIN D) 2000 UNITS CAPS Take by mouth  • Glycerin-Hypromellose- (DRY EYE RELIEF DROPS OP) Apply to eye   • losartan (COZAAR) 100 MG tablet Take 1 tablet (100 mg total) by mouth daily   • omeprazole (PriLOSEC) 40 MG capsule Take 1 capsule (40 mg total) by mouth daily     No current facility-administered medications on file prior to visit  She is allergic to atorvastatin, other, and pneumococcal vaccines            Objective:    Blood pressure 160/78, pulse 85, temperature (!) 97 4 °F (36 3 °C), temperature source Skin, height 5' 2" (1 575 m), weight 56 7 kg (125 lb), last menstrual period 07/22/1986  Physical Exam    Neurological Exam  CONSTITUTIONAL: NAD, pleasant   NECK: supple, no lymphadenopathy, no thyromegaly, no JVD  CARDIOVASCULAR: RRR, normal S1S2, no murmurs, no rubs  RESP: clear to auscultation bilaterally, no wheezes/rhonchi/rales  ABDOMEN: soft, non tender, non distended  SKIN: no rash or skin lesions  EXTREMITIES: no edema, pulses 2+bilaterally  PSYCH: appropriate mood and affect  NEUROLOGIC COMPREHENSIVE EXAM: Patient is oriented to person, place and time, NAD; appropriate affect  CN II, III, IV, V, VI, VII,VIII,IX,X,XI-XII intact with EOMI, PERRLA, OKN intact, left eye wide opened, no signs of ptosis OU; VF grossly intact, fundi poorly visualized secondary to pupillary constriction; symmetric face noted  Motor: 5/5 UE/LE bilateral symmetric; Sensory: intact to light touch and pinprick bilaterally; normal vibration sensation feet bilaterally; Coordination within normal limits on FTN and TABITHA testing; DTR: 2/4 through, no Babinski, no clonus  Tandem gait is intact  Romberg: absent  Head tremor noted  ROS:  12 points of review of system was reviewed with the patient and was unremarkable with exception: see HPI  Review of Systems   Constitutional: Negative  Negative for appetite change and fever  HENT: Negative  Negative for hearing loss, tinnitus, trouble swallowing and voice change  Eyes: Negative  Negative for photophobia, pain and visual disturbance  Patient states she has a feeling sensation in left eye like a "tickle"and feels something in the left eyelid  Patient had cataract surgery on 01/31/2022 by Dr Vidal Thakur  Respiratory: Negative  Negative for shortness of breath  Cardiovascular: Negative  Negative for palpitations  Gastrointestinal: Negative  Negative for nausea and vomiting  Endocrine: Negative  Negative for cold intolerance  Genitourinary: Negative  Negative for dysuria, frequency and urgency  Musculoskeletal: Negative  Negative for gait problem, myalgias and neck pain  Skin: Negative  Negative for rash  Allergic/Immunologic: Negative  Neurological: Negative  Negative for dizziness, tremors, seizures, syncope, facial asymmetry, speech difficulty, weakness, light-headedness, numbness and headaches  Hematological: Negative  Does not bruise/bleed easily  Psychiatric/Behavioral: Negative  Negative for confusion, hallucinations and sleep disturbance

## 2023-01-26 ENCOUNTER — FOLLOW UP (OUTPATIENT)
Dept: URBAN - METROPOLITAN AREA CLINIC 6 | Facility: CLINIC | Age: 83
End: 2023-01-26

## 2023-01-26 DIAGNOSIS — H35.372: ICD-10-CM

## 2023-01-26 DIAGNOSIS — H04.123: ICD-10-CM

## 2023-01-26 DIAGNOSIS — Z96.1: ICD-10-CM

## 2023-01-26 DIAGNOSIS — H43.813: ICD-10-CM

## 2023-01-26 DIAGNOSIS — H35.363: ICD-10-CM

## 2023-01-26 PROCEDURE — 92134 CPTRZ OPH DX IMG PST SGM RTA: CPT

## 2023-01-26 PROCEDURE — 92014 COMPRE OPH EXAM EST PT 1/>: CPT

## 2023-01-26 ASSESSMENT — VISUAL ACUITY
OS_CC: 20/40
OU_CC: 20/40
OU_CC: J2
OD_CC: 20/40

## 2023-01-26 ASSESSMENT — TONOMETRY
OS_IOP_MMHG: 12
OD_IOP_MMHG: 13

## 2023-02-06 ENCOUNTER — HOSPITAL ENCOUNTER (OUTPATIENT)
Dept: MRI IMAGING | Facility: HOSPITAL | Age: 83
Discharge: HOME | End: 2023-02-06
Attending: PSYCHIATRY & NEUROLOGY

## 2023-02-06 DIAGNOSIS — Z98.49 H/O CATARACT REMOVAL WITH INSERTION OF PROSTHETIC LENS: ICD-10-CM

## 2023-02-06 DIAGNOSIS — R20.0 LEFT FACIAL NUMBNESS: ICD-10-CM

## 2023-02-06 DIAGNOSIS — G25.0 BENIGN ESSENTIAL TREMOR: ICD-10-CM

## 2023-02-06 DIAGNOSIS — Z96.1 H/O CATARACT REMOVAL WITH INSERTION OF PROSTHETIC LENS: ICD-10-CM

## 2023-02-06 RX ADMIN — GADOBUTROL 5 ML: 604.72 INJECTION INTRAVENOUS at 20:22

## 2023-02-08 LAB
ALBUMIN SERPL-MCNC: 4.2 G/DL (ref 3.6–5.1)
ALBUMIN/GLOB SERPL: 1.7 (CALC) (ref 1–2.5)
ALP SERPL-CCNC: 92 U/L (ref 37–153)
ALT SERPL-CCNC: 10 U/L (ref 6–29)
AST SERPL-CCNC: 14 U/L (ref 10–35)
BASOPHILS # BLD AUTO: 20 CELLS/UL (ref 0–200)
BASOPHILS NFR BLD AUTO: 0.5 %
BILIRUB SERPL-MCNC: 0.6 MG/DL (ref 0.2–1.2)
BUN SERPL-MCNC: 18 MG/DL (ref 7–25)
BUN/CREAT SERPL: NORMAL (CALC) (ref 6–22)
CALCIUM SERPL-MCNC: 9.7 MG/DL (ref 8.6–10.4)
CHLORIDE SERPL-SCNC: 104 MMOL/L (ref 98–110)
CO2 SERPL-SCNC: 31 MMOL/L (ref 20–32)
CREAT SERPL-MCNC: 0.69 MG/DL (ref 0.6–0.95)
EOSINOPHIL # BLD AUTO: 68 CELLS/UL (ref 15–500)
EOSINOPHIL NFR BLD AUTO: 1.7 %
ERYTHROCYTE [DISTWIDTH] IN BLOOD BY AUTOMATED COUNT: 13.1 % (ref 11–15)
GFR/BSA.PRED SERPLBLD CYS-BASED-ARV: 88 ML/MIN/1.73M2
GLOBULIN SER CALC-MCNC: 2.5 G/DL (CALC) (ref 1.9–3.7)
GLUCOSE SERPL-MCNC: 92 MG/DL (ref 65–99)
HCT VFR BLD AUTO: 42.1 % (ref 35–45)
HGB BLD-MCNC: 14.3 G/DL (ref 11.7–15.5)
LYMPHOCYTES # BLD AUTO: 1096 CELLS/UL (ref 850–3900)
LYMPHOCYTES NFR BLD AUTO: 27.4 %
MCH RBC QN AUTO: 32.4 PG (ref 27–33)
MCHC RBC AUTO-ENTMCNC: 34 G/DL (ref 32–36)
MCV RBC AUTO: 95.2 FL (ref 80–100)
MONOCYTES # BLD AUTO: 340 CELLS/UL (ref 200–950)
MONOCYTES NFR BLD AUTO: 8.5 %
NEUTROPHILS # BLD AUTO: 2476 CELLS/UL (ref 1500–7800)
NEUTROPHILS NFR BLD AUTO: 61.9 %
PLATELET # BLD AUTO: 337 THOUSAND/UL (ref 140–400)
PMV BLD REES-ECKER: 10.4 FL (ref 7.5–12.5)
POTASSIUM SERPL-SCNC: 3.9 MMOL/L (ref 3.5–5.3)
PROT SERPL-MCNC: 6.7 G/DL (ref 6.1–8.1)
RBC # BLD AUTO: 4.42 MILLION/UL (ref 3.8–5.1)
SODIUM SERPL-SCNC: 142 MMOL/L (ref 135–146)
TSH SERPL-ACNC: 1.67 MIU/L (ref 0.4–4.5)
WBC # BLD AUTO: 4 THOUSAND/UL (ref 3.8–10.8)

## 2023-02-14 ENCOUNTER — OFFICE VISIT (OUTPATIENT)
Dept: FAMILY MEDICINE CLINIC | Facility: CLINIC | Age: 83
End: 2023-02-14

## 2023-02-14 VITALS
DIASTOLIC BLOOD PRESSURE: 68 MMHG | WEIGHT: 121.4 LBS | HEIGHT: 62 IN | RESPIRATION RATE: 16 BRPM | OXYGEN SATURATION: 97 % | SYSTOLIC BLOOD PRESSURE: 132 MMHG | HEART RATE: 88 BPM | TEMPERATURE: 97.7 F | BODY MASS INDEX: 22.34 KG/M2

## 2023-02-14 DIAGNOSIS — E78.2 MIXED HYPERLIPIDEMIA: ICD-10-CM

## 2023-02-14 DIAGNOSIS — K21.9 GASTROESOPHAGEAL REFLUX DISEASE WITHOUT ESOPHAGITIS: ICD-10-CM

## 2023-02-14 DIAGNOSIS — I10 ESSENTIAL HYPERTENSION: Primary | ICD-10-CM

## 2023-02-14 NOTE — PROGRESS NOTES
FAMILY PRACTICE OFFICE VISIT       NAME: Ophelia Tubbs  AGE: [de-identified] y o  SEX: female       : 1940        MRN: [id number]    DATE: 2023  TIME: 5:09 PM    Assessment and Plan   1  Essential hypertension  Assessment & Plan:  Stable  Cont meds      Orders:  -     Comprehensive metabolic panel; Future; Expected date: 2023  -     CBC and differential; Future; Expected date: 2023  -     Lipid Panel with Direct LDL reflex; Future; Expected date: 2023    2  Mixed hyperlipidemia  Assessment & Plan:  Stable  Cont meds  Cont low fat diet      Orders:  -     Comprehensive metabolic panel; Future; Expected date: 2023  -     CBC and differential; Future; Expected date: 2023  -     Lipid Panel with Direct LDL reflex; Future; Expected date: 2023    3  Gastroesophageal reflux disease without esophagitis  Assessment & Plan:  Stable  Cont meds                   Chief Complaint     Chief Complaint   Patient presents with   • Follow-up     Patient is being seen for a 6 month follow up  • Hypertension   • GERD     Hyperlipidemia         History of Present Illness   Ophelia Tubbs is a [de-identified]y o -year-old female who is here for f/u to chronic medical conditions  Feeling well overall  Tolerating meds without side affects  Labs reviewed      Review of Systems   Review of Systems   Constitutional: Negative for fatigue and fever  HENT: Negative for congestion, postnasal drip and rhinorrhea  Eyes: Negative for photophobia and visual disturbance  Respiratory: Negative for cough, shortness of breath and wheezing  Cardiovascular: Negative for chest pain and palpitations  Gastrointestinal: Negative for constipation, diarrhea, nausea and vomiting  Genitourinary: Negative for dysuria and frequency  Musculoskeletal: Negative for arthralgias and myalgias  Skin: Negative for rash  Neurological: Negative for dizziness, light-headedness and headaches     Hematological: Negative for adenopathy  Psychiatric/Behavioral: Negative for dysphoric mood and sleep disturbance  The patient is not nervous/anxious  Active Problem List     Patient Active Problem List   Diagnosis   • Acid reflux disease   • Essential hypertension   • Hyperlipidemia   • Spondylosis of cervical region without myelopathy or radiculopathy   • Thyroid nodule   • Vitamin D deficiency   • H/O cataract removal with insertion of prosthetic lens   • Benign essential tremor   • Trigeminal neuropathy   • Myogenic ptosis, left   • Abnormal CT scan, gallbladder         Past Medical History:  Past Medical History:   Diagnosis Date   • GERD (gastroesophageal reflux disease)    • Hyperlipidemia    • Hypertension    • Pneumonia    • Spinal stenosis    • Trapezius muscle spasm     last assessed - 29Jul2016       Past Surgical History:  Past Surgical History:   Procedure Laterality Date   • BREAST CYST ASPIRATION Right 2010   • CATARACT EXTRACTION, BILATERAL     • DEBRIDEMENT TENNIS ELBOW     • ELBOW SURGERY Right     tennis elbow   • FASCIOTOMY ELBOW Right     of the lateral epicondyle; last assessed - 10Oct2014   • HYSTERECTOMY      WIN-BSO   • OOPHORECTOMY Bilateral    • ME COLONOSCOPY FLX DX W/COLLJ SPEC WHEN PFRMD N/A 05/01/2017    Procedure: EGD AND COLONOSCOPY;  Surgeon: Jasper Cervantes MD;  Location: [facility];   Service: Gastroenterology       Family History:  Family History   Problem Relation Age of Onset   • Cancer Sister    • Colon cancer Sister 71   • Diabetes Sister    • Melanoma Mother    • Cirrhosis Father         alcoholic   • Stroke Father         CVA (cerebral vascular accident)   • Arrhythmia Father         pacemaker placement   • No Known Problems Maternal Grandmother    • No Known Problems Maternal Grandfather    • No Known Problems Paternal Grandmother    • No Known Problems Paternal Grandfather    • No Known Problems Sister    • No Known Problems Sister    • No Known Problems Sister    • No Known Problems Sister    • No Known Problems Maternal Aunt    • No Known Problems Maternal Aunt    • No Known Problems Maternal Aunt    • No Known Problems Maternal Aunt    • No Known Problems Maternal Aunt    • No Known Problems Maternal Aunt    • No Known Problems Paternal Aunt    • No Known Problems Paternal Aunt    • No Known Problems Paternal Aunt        Social History:  Social History     Socioeconomic History   • Marital status: /Civil Union     Spouse name: Not on file   • Number of children: 2   • Years of education: Not on file   • Highest education level: Not on file   Occupational History   • Occupation: Retired -  - [employer]   Tobacco Use   • Smoking status: Former     Packs/day: 0 50     Types: Cigarettes     Start date: 1962   • Smokeless tobacco: Former   • Tobacco comments:     Quit June 2014 - 1/2 ppd for 40 years per [employer]   Vaping Use   • Vaping Use: Never used   Substance and Sexual Activity   • Alcohol use: No     Comment: Consumes alcohol occasionally per [employer]   • Drug use: No   • Sexual activity: Not Currently   Other Topics Concern   • Not on file   Social History Narrative    Two children - [place] [id number]     Social Determinants of Health     Financial Resource Strain: Low Risk    • Difficulty of Paying Living Expenses: Not hard at all   Food Insecurity: Not on file   Transportation Needs: No Transportation Needs   • Lack of Transportation (Medical): No   • Lack of Transportation (Non-Medical):  No   Physical Activity: Not on file   Stress: Not on file   Social Connections: Not on file   Intimate Partner Violence: Not on file   Housing Stability: Not on file       Objective     Vitals:    02/14/23 1004   BP: 132/68   Pulse: 88   Resp: 16   Temp: 97 7 °F (36 5 °C)   SpO2: 97%     Wt Readings from Last 3 Encounters:   02/14/23 55 1 kg (121 lb 6 4 oz)   01/05/23 53 8 kg (118 lb 9 6 oz)   12/29/22 53 8 kg (118 lb 9 6 oz)       Physical Exam  Vitals and nursing note reviewed  Constitutional:       Appearance: Normal appearance  HENT:      Head: Normocephalic and atraumatic  Right Ear: Tympanic membrane, ear canal and external ear normal       Left Ear: Tympanic membrane, ear canal and external ear normal       Nose: Nose normal       Mouth/Throat:      Mouth: Mucous membranes are moist    Eyes:      Conjunctiva/sclera: Conjunctivae normal    Neck:      Thyroid: No thyroid mass, thyromegaly or thyroid tenderness  Vascular: No carotid bruit  Cardiovascular:      Rate and Rhythm: Normal rate and regular rhythm  Pulses: Normal pulses  Heart sounds: Normal heart sounds  Pulmonary:      Effort: Pulmonary effort is normal       Breath sounds: Normal breath sounds  Abdominal:      General: Bowel sounds are normal       Palpations: Abdomen is soft  Musculoskeletal:         General: Normal range of motion  Cervical back: Normal range of motion and neck supple  Skin:     General: Skin is warm and dry  Capillary Refill: Capillary refill takes less than 2 seconds  Neurological:      General: No focal deficit present  Mental Status: She is alert and oriented to person, place, and time  Psychiatric:         Mood and Affect: Mood normal          Behavior: Behavior normal          Thought Content:  Thought content normal          Judgment: Judgment normal          Pertinent Laboratory/Diagnostic Studies:  Lab Results   Component Value Date    GLUCOSE 178 (H) 09/16/2016    BUN 18 02/08/2023    CREATININE 0 69 02/08/2023    CALCIUM 9 7 02/08/2023     10/11/2016    K 3 9 02/08/2023    CO2 31 02/08/2023     02/08/2023     Lab Results   Component Value Date    ALT 10 02/08/2023    AST 14 02/08/2023    ALKPHOS 92 02/08/2023    BILITOT 0 4 10/11/2016       Lab Results   Component Value Date    WBC 4 0 02/08/2023    HGB 14 3 02/08/2023    HCT 42 1 02/08/2023    MCV 95 2 02/08/2023     02/08/2023       No results found for: TSH    Lab Results   Component Value Date    CHOL 251 (H) 10/11/2016     Lab Results   Component Value Date    TRIG 98 08/03/2022     Lab Results   Component Value Date    HDL 78 08/03/2022     Lab Results   Component Value Date    LDLCALC 128 (H) 08/03/2022     No results found for: HGBA1C    Results for orders placed or performed in visit on 02/08/23   Comprehensive metabolic panel   Result Value Ref Range    Glucose, Random 92 65 - 99 mg/dL    BUN 18 7 - 25 mg/dL    Creatinine 0 69 0 60 - 0 95 mg/dL    eGFR 88 > OR = 60 mL/min/1 73m2    SL AMB BUN/CREATININE RATIO NOT APPLICABLE 6 - 22 (calc)    Sodium 142 135 - 146 mmol/L    Potassium 3 9 3 5 - 5 3 mmol/L    Chloride 104 98 - 110 mmol/L    CO2 31 20 - 32 mmol/L    Calcium 9 7 8 6 - 10 4 mg/dL    Protein, Total 6 7 6 1 - 8 1 g/dL    Albumin 4 2 3 6 - 5 1 g/dL    Globulin 2 5 1 9 - 3 7 g/dL (calc)    Albumin/Globulin Ratio 1 7 1 0 - 2 5 (calc)    TOTAL BILIRUBIN 0 6 0 2 - 1 2 mg/dL    Alkaline Phosphatase 92 37 - 153 U/L    AST 14 10 - 35 U/L    ALT 10 6 - 29 U/L   CBC and differential   Result Value Ref Range    White Blood Cell Count 4 0 3 8 - 10 8 Thousand/uL    Red Blood Cell Count 4 42 3 80 - 5 10 Million/uL    Hemoglobin 14 3 11 7 - 15 5 g/dL    HCT 42 1 35 0 - 45 0 %    MCV 95 2 80 0 - 100 0 fL    MCH 32 4 27 0 - 33 0 pg    MCHC 34 0 32 0 - 36 0 g/dL    RDW 13 1 11 0 - 15 0 %    Platelet Count 282 726 - 400 Thousand/uL    SL AMB MPV 10 4 7 5 - 12 5 fL    Neutrophils (Absolute) 2,476 1,500 - 7,800 cells/uL    Lymphocytes (Absolute) 1,096 850 - 3,900 cells/uL    Monocytes (Absolute) 340 200 - 950 cells/uL    Eosinophils (Absolute) 68 15 - 500 cells/uL    Basophils ABS 20 0 - 200 cells/uL    Neutrophils 61 9 %    Lymphocytes 27 4 %    Monocytes 8 5 %    Eosinophils 1 7 %    Basophils PCT 0 5 %   TSH, 3rd generation with Free T4 reflex   Result Value Ref Range    TSH W/RFX TO FREE T4 1 67 0 40 - 4 50 mIU/L       Orders Placed This Encounter   Procedures   • Comprehensive metabolic panel   • CBC and differential   • Lipid Panel with Direct LDL reflex       ALLERGIES:  Allergies   Allergen Reactions   • Atorvastatin Edema   • Pneumococcal Vaccines Edema       Current Medications     Current Outpatient Medications   Medication Sig Dispense Refill   • amLODIPine (NORVASC) 5 mg tablet Take 1 tablet (5 mg total) by mouth daily 90 tablet 3   • Cholecalciferol (VITAMIN D) 2000 UNITS CAPS Take by mouth  • Glycerin-Hypromellose- (DRY EYE RELIEF DROPS OP) Apply to eye     • losartan (COZAAR) 100 MG tablet Take 1 tablet (100 mg total) by mouth daily 90 tablet 3   • omeprazole (PriLOSEC) 40 MG capsule Take 1 capsule (40 mg total) by mouth daily 90 capsule 3   • predniSONE 10 mg tablet Take 3tabs daily for 3days then 2t daily for 3d then 1t daily for 3d 18 tablet 0     No current facility-administered medications for this visit           Health Maintenance     Health Maintenance   Topic Date Due   • SLP PLAN OF CARE  Never done   • Osteoporosis Screening  Never done   • COVID-19 Vaccine (5 - Booster for Pfizer series) 10/18/2022   • Pneumococcal Vaccine: 65+ Years (2 - PCV) 07/05/2032 (Originally 10/12/2011)   • Medicare Annual Wellness Visit (AWV)  08/09/2023   • Fall Risk  02/14/2024   • Depression Screening  02/14/2024   • Urinary Incontinence Screening  02/14/2024   • BMI: Adult  02/14/2024   • Breast Cancer Screening: Mammogram  09/09/2024   • Hepatitis C Screening  Completed   • Influenza Vaccine  Completed   • HIB Vaccine  Aged Out   • IPV Vaccine  Aged Out   • Hepatitis A Vaccine  Aged Out   • Meningococcal ACWY Vaccine  Aged Out   • HPV Vaccine  Aged Out   • Colorectal Cancer Screening  Discontinued     Immunization History   Administered Date(s) Administered   • COVID-19 PFIZER VACCINE 0 3 ML IM 01/21/2021, 02/09/2021, 10/23/2021   • COVID-19 Pfizer vac (Jonnathan-sucrose, gray cap) 12 yr+ IM 08/23/2022   • COVID-19, unspecified 08/23/2022   • INFLUENZA 10/25/2016, 10/24/2017, 11/01/2018, 11/24/2018, 10/24/2019, 11/09/2021, 10/24/2022   • Influenza Split High Dose Preservative Free IM 10/10/2014, 10/13/2015, 10/25/2016, 11/01/2018, 11/07/2019   • Influenza, high dose seasonal 0 7 mL 10/24/2019, 10/24/2022   • Influenza, recombinant, quadrivalent,injectable, preservative free 11/03/2020   • Influenza, seasonal, injectable 10/08/2013   • Influenza, seasonal, injectable, preservative free 11/24/2018   • Pneumococcal Polysaccharide PPV23 10/12/2010   • Zoster 05/31/2017       Depression Screening and Follow-up Plan: Patient was screened for depression during today's encounter  They screened negative with a PHQ-2 score of 0          IRAM Hurtado

## 2023-02-21 ENCOUNTER — TELEPHONE (OUTPATIENT)
Dept: NEUROLOGY | Facility: CLINIC | Age: 83
End: 2023-02-21

## 2023-02-21 NOTE — TELEPHONE ENCOUNTER
Reminder appt call     Patient is scheduled on 03/02/2023 @ 730 am with an arrival time  715 am in the [place] location with Dr Grant Scott  Patient confirmed appt

## 2023-03-02 ENCOUNTER — OFFICE VISIT (OUTPATIENT)
Dept: NEUROLOGY | Facility: CLINIC | Age: 83
End: 2023-03-02

## 2023-03-02 VITALS
DIASTOLIC BLOOD PRESSURE: 72 MMHG | WEIGHT: 120 LBS | HEART RATE: 90 BPM | SYSTOLIC BLOOD PRESSURE: 168 MMHG | BODY MASS INDEX: 22.08 KG/M2 | HEIGHT: 62 IN | TEMPERATURE: 98.1 F

## 2023-03-02 DIAGNOSIS — G50.9 TRIGEMINAL NEUROPATHY: Primary | ICD-10-CM

## 2023-03-02 DIAGNOSIS — G25.0 BENIGN ESSENTIAL TREMOR: ICD-10-CM

## 2023-03-02 NOTE — PROGRESS NOTES
Patient ID: Davian Mccarthy is a [de-identified] y o  female  Assessment/Plan:           Problem List Items Addressed This Visit        Nervous and Auditory    Benign essential tremor    Trigeminal neuropathy - Primary      Mrs Familia Palmer has presented to [address] [place] for follow-up on facial pain  Patient was previously diagnosed with trigeminal neuropathy with work-up was offered to the patient completing imaging of the brain and serological work-up  Brain MRI completed, we personally reviewed patient findings, patient has no acute or chronic ischemic or hemorrhagic changes, no signs of skin examination, no signs of inflammatory or infectious process in peripheral nerves or manages to bring concern for trigeminal nerve dysfunction  Patient has no significant brain atrophy considering her age suggestive of excellent function of her cognitive part as well  Serologic work-up FLAKITO/SPEP/B12/ESR is pending since November 2022  Patient describes having sensory dysfunction in the left supraorbital region when patient touching her eyebrow with no pain described  We extensively discussed patient has likely had peripheral trigeminal neuropathy;   Patient was hospitalized for multifocal pneumonia due to strep and was discharged on oral antibiotics on December 21, 2022    Patient is to continue healthy diet reports regular physical activity, patient is to follow with [facility] neurology on as-needed basis  Subjective: Sensory dysfunction to touch left eyebrow    HPI  Mrs Familia Palmer has presented to  [address] [place] [address] for evaluation of left forehead and left eyebrow numbness  Since last office visit, patient described having no disease progression, no new focal sensory or motor dysfunction with persistent same findings involving the left eyebrow  Patient stated she has numbness in that region with no pain right after she had completed cataract surgery    No vision loss, no double vision, no facial asymmetry has been noted  Patient stated patient does not have sensory or motor dysfunction if she is not touching her face with her hands  Patient had completed imaging studies and she is here today to discuss her findings    MRI brain 2/6/2023: Unremarkable brain and orbital MRI except for mild chronic microangiopathy changes  Patient has signs of head tremor with concern for benign essential tremor  Patient does have hyperreflexia with known history of anxiety disorder  Patient will be offered several serological test and based on the findings may require additional workup  Patient has no focal sensory motor dysfunction, ambulatory dysfunction is intact  Patient has no imaging of the brain available for my review;   Patient was referred by [facility] Dr Maximo Handley  Patient stated she had cataract removal and lens placement on January 31st since then she reports having sensory dysfunction in her left eyebrow  Patient is using pencil for her eyebrow and she feels numb  Patient also states that her left upper lid does not open all the way and at night she has moments when she has challenging open her left eye  Patient reports no permanent ptosis or double vision or vision loss  Patient also described having sensation in the lateral part of her lower lids were critical   Patient describes no other neurological complaints  Patient has no dysarthria, dysphagia,  No dizziness, no vertigo, no worrisome full  Patient had surgical intervention her left side of the face for skin cancer removal   Patient also mentioned her daughter-in-law and her son knows it is the patient had tremor, no head tremor or voice tremor has been described, no bony problem has been noted  MRI brain 2/2023: BRAIN PARENCHYMA:  There is no discrete mass, mass effect or midline shift  Brainstem and cerebellum demonstrate normal signal  There is no intracranial hemorrhage    There is no evidence of acute infarction and diffusion imaging is unremarkable  Small   scattered hyperintensities on T2/FLAIR imaging are noted in the periventricular and subcortical white matter demonstrating an appearance that is statistically most likely to represent mild microangiopathic change  Normal postcontrast imaging      ORBITS:  Normal globes  Normal ocular muscles  Optic nerves and chiasm are normal   Normal cavernous sinuses  Preseptal and retrobulbar soft tissues are normal        The following portions of the patient's history were reviewed and updated as appropriate:   She  has a past medical history of GERD (gastroesophageal reflux disease), Hyperlipidemia, Hypertension, Pneumonia, Spinal stenosis, and Trapezius muscle spasm  She   Patient Active Problem List    Diagnosis Date Noted   • Burning sensation 03/03/2023   • Abnormal CT scan, gallbladder 12/22/2022   • H/O cataract removal with insertion of prosthetic lens 11/16/2022   • Benign essential tremor 11/16/2022   • Trigeminal neuropathy 11/16/2022   • Myogenic ptosis, left 11/16/2022   • Acid reflux disease 02/27/2017   • Essential hypertension 10/25/2016   • Spondylosis of cervical region without myelopathy or radiculopathy 07/29/2016   • Thyroid nodule 10/10/2014   • Hyperlipidemia 10/04/2012   • Vitamin D deficiency 10/04/2012     She  has a past surgical history that includes Hysterectomy; Debridement tennis elbow; Elbow surgery (Right); pr colonoscopy flx dx w/collj spec when pfrmd (N/A, 05/01/2017); Fasciotomy elbow (Right); Oophorectomy (Bilateral);  Breast cyst aspiration (Right, 2010); and Cataract extraction, bilateral   Her family history includes Arrhythmia in her father; Cancer in her sister; Cirrhosis in her father; Colon cancer (age of onset: 71) in her sister; Diabetes in her sister; Melanoma in her mother; No Known Problems in her maternal aunt, maternal aunt, maternal aunt, maternal aunt, maternal aunt, maternal aunt, maternal grandfather, maternal grandmother, paternal aunt, paternal aunt, paternal aunt, paternal grandfather, paternal grandmother, sister, sister, sister, and sister; Stroke in her father  She  reports that she has quit smoking  Her smoking use included cigarettes  She started smoking about 61 years ago  She smoked an average of  5 packs per day  She has quit using smokeless tobacco  She reports that she does not drink alcohol and does not use drugs  Current Outpatient Medications   Medication Sig Dispense Refill   • amLODIPine (NORVASC) 5 mg tablet Take 1 tablet (5 mg total) by mouth daily 90 tablet 3   • Cholecalciferol (VITAMIN D) 2000 UNITS CAPS Take by mouth  • Glycerin-Hypromellose- (DRY EYE RELIEF DROPS OP) Apply to eye (Patient not taking: Reported on 3/3/2023)     • losartan (COZAAR) 100 MG tablet Take 1 tablet (100 mg total) by mouth daily 90 tablet 3   • omeprazole (PriLOSEC) 40 MG capsule Take 1 capsule (40 mg total) by mouth daily 90 capsule 3   • gabapentin (Neurontin) 100 mg capsule Take 1 capsule (100 mg total) by mouth 2 (two) times a day as needed (pain) 20 capsule 0   • predniSONE 10 mg tablet Take 3tabs daily for 3days then 2t daily for 3d then 1t daily for 3d (Patient not taking: Reported on 3/2/2023) 18 tablet 0   • valACYclovir (VALTREX) 1,000 mg tablet Take 1 tablet (1,000 mg total) by mouth 3 (three) times a day for 7 days 21 tablet 0     No current facility-administered medications for this visit  Current Outpatient Medications on File Prior to Visit   Medication Sig   • amLODIPine (NORVASC) 5 mg tablet Take 1 tablet (5 mg total) by mouth daily   • Cholecalciferol (VITAMIN D) 2000 UNITS CAPS Take by mouth     • Glycerin-Hypromellose- (DRY EYE RELIEF DROPS OP) Apply to eye (Patient not taking: Reported on 3/3/2023)   • losartan (COZAAR) 100 MG tablet Take 1 tablet (100 mg total) by mouth daily   • omeprazole (PriLOSEC) 40 MG capsule Take 1 capsule (40 mg total) by mouth daily   • predniSONE 10 mg tablet Take 3tabs daily for 3days then 2t daily for 3d then 1t daily for 3d (Patient not taking: Reported on 3/2/2023)     No current facility-administered medications on file prior to visit  She is allergic to atorvastatin and pneumococcal vaccines            Objective:    Blood pressure 168/72, pulse 90, temperature 98 1 °F (36 7 °C), temperature source Temporal, height 5' 2" (1 575 m), weight 54 4 kg (120 lb), last menstrual period 07/22/1986  Physical Exam    Neurological Exam    CONSTITUTIONAL: NAD, pleasant  NECK: supple, no lymphadenopathy, no thyromegaly, no JVD  CARDIOVASCULAR: RRR, normal S1S2, no murmurs, no rubs  RESP: clear to auscultation bilaterally, no wheezes/rhonchi/rales  ABDOMEN: soft, non tender, non distended  SKIN: no rash or skin lesions  EXTREMITIES: no edema, pulses 2+bilaterally  PSYCH: appropriate mood and affect  NEUROLOGIC COMPREHENSIVE EXAM: Patient is oriented to person, place and time, NAD; appropriate affect  CN II, III, IV, V, VI, VII,VIII,IX,X,XI-XII intact with EOMI, PERRLA, OKN intact, VF grossly intact, fundi poorly visualized secondary to pupillary constriction; symmetric face noted  Motor: 5/5 UE/LE bilateral symmetric; Sensory: intact to light touch and pinprick bilaterally; normal vibration sensation feet bilaterally; Coordination within normal limits on FTN and TABITHA testing; DTR: 2/4 through, no Babinski, no clonus  Tandem gait is intact  Romberg: absent  ROS:  12 points of review of system was reviewed with the patient and was unremarkable with exception: see HPI  Review of Systems  Constitutional: Negative  Negative for appetite change and fever  HENT: Negative  Negative for hearing loss, tinnitus, trouble swallowing and voice change  Eyes: Negative  Negative for photophobia, pain and visual disturbance  Respiratory: Negative  Negative for shortness of breath  Cardiovascular: Negative  Negative for palpitations  Gastrointestinal: Negative    Negative for nausea and vomiting  Endocrine: Negative  Negative for cold intolerance  Genitourinary: Negative  Negative for dysuria, frequency and urgency  Musculoskeletal: Negative  Negative for gait problem, myalgias and neck pain  Skin: Negative  Negative for rash  Allergic/Immunologic: Negative  Neurological: Negative  Negative for dizziness, tremors, seizures, syncope, facial asymmetry, speech difficulty, weakness, light-headedness, numbness and headaches  Hematological: Negative  Does not bruise/bleed easily  Psychiatric/Behavioral: Negative  Negative for confusion, hallucinations and sleep disturbance

## 2023-03-02 NOTE — PROGRESS NOTES
Patient ID: Fang Bangura is a [de-identified] y o  female  Assessment/Plan:    No problem-specific Assessment & Plan notes found for this encounter  {Assess/PlanSmartLinks:15872}       Subjective:    HPI    {[facility] HPI texts:38421}    {Common ambulatory SmartLinks:40570}         Objective:    Last menstrual period 07/22/1986  Physical Exam    Neurological Exam      ROS:    Review of Systems   Constitutional: Negative  Negative for appetite change and fever  HENT: Negative  Negative for hearing loss, tinnitus, trouble swallowing and voice change  Eyes: Negative  Negative for photophobia, pain and visual disturbance  Respiratory: Negative  Negative for shortness of breath  Cardiovascular: Negative  Negative for palpitations  Gastrointestinal: Negative  Negative for nausea and vomiting  Endocrine: Negative  Negative for cold intolerance  Genitourinary: Negative  Negative for dysuria, frequency and urgency  Musculoskeletal: Negative  Negative for gait problem, myalgias and neck pain  Skin: Negative  Negative for rash  Allergic/Immunologic: Negative  Neurological: Negative  Negative for dizziness, tremors, seizures, syncope, facial asymmetry, speech difficulty, weakness, light-headedness, numbness and headaches  Hematological: Negative  Does not bruise/bleed easily  Psychiatric/Behavioral: Negative  Negative for confusion, hallucinations and sleep disturbance

## 2023-03-03 ENCOUNTER — OFFICE VISIT (OUTPATIENT)
Dept: FAMILY MEDICINE CLINIC | Facility: CLINIC | Age: 83
End: 2023-03-03

## 2023-03-03 VITALS
HEART RATE: 102 BPM | TEMPERATURE: 97 F | BODY MASS INDEX: 21.86 KG/M2 | OXYGEN SATURATION: 96 % | WEIGHT: 118.8 LBS | RESPIRATION RATE: 16 BRPM | SYSTOLIC BLOOD PRESSURE: 150 MMHG | HEIGHT: 62 IN | DIASTOLIC BLOOD PRESSURE: 70 MMHG

## 2023-03-03 DIAGNOSIS — R20.8 BURNING SENSATION: Primary | ICD-10-CM

## 2023-03-03 RX ORDER — VALACYCLOVIR HYDROCHLORIDE 1 G/1
1000 TABLET, FILM COATED ORAL 3 TIMES DAILY
Qty: 21 TABLET | Refills: 0 | Status: SHIPPED | OUTPATIENT
Start: 2023-03-03 | End: 2023-03-10

## 2023-03-03 RX ORDER — GABAPENTIN 100 MG/1
100 CAPSULE ORAL 2 TIMES DAILY PRN
Qty: 20 CAPSULE | Refills: 0 | Status: SHIPPED | OUTPATIENT
Start: 2023-03-03

## 2023-03-03 NOTE — PROGRESS NOTES
Name: Gunnar Velasquez      : 1940      MRN: [id number]  Encounter Provider: IRAM Kline  Encounter Date: 3/3/2023   Encounter department: [place] [address]  Burning sensation  Assessment & Plan:  Thoracic back pain with burning sensation when clothes touch the area for 2 days  No rash present  Presentation most suggestive of shingles, may be prodromal pain  Exam of back otherwise unremarkable  Would like to treat as shingles with valacyclovir and prn gabapentin  Pt advised to monitor area for rash  Offered f/u next week, she says she will call with an update early in the week to let me know how sx have progressed  F/u if worsening or not improving  Orders:  -     valACYclovir (VALTREX) 1,000 mg tablet; Take 1 tablet (1,000 mg total) by mouth 3 (three) times a day for 7 days  -     gabapentin (Neurontin) 100 mg capsule; Take 1 capsule (100 mg total) by mouth 2 (two) times a day as needed (pain)           Subjective      Pt is an [de-identified] yo female here today for evaluation of back pain  PMH of acid reflux, thyroid nodule, HTN, essential tremor, cervical spondylosis, HLD, trigeminal neuroalgia  She started with back pain on , new in onset and different from any other back pain she has had in the past   She says pain is in the middle under her bra  She says pain goes away if she is sitting and not doing anything  She says that her clothes on the area increase her pain  She describes pain as a burning sensation, she rates pain 7-8/10  No injury prior to onset  She feels discomfort when she moves  No numbness or tingling, denies fever, chills, fatigue, headache  She takes tylenol, this does not seem to help  Review of Systems   Constitutional: Negative for activity change, chills, fatigue and fever  Respiratory: Negative for shortness of breath  Cardiovascular: Negative for chest pain, palpitations and leg swelling     Musculoskeletal: Positive for back pain  Negative for arthralgias, joint swelling and myalgias  Skin: Negative for color change, rash and wound  Neurological: Negative for dizziness, weakness and headaches  Hematological: Negative for adenopathy  Current Outpatient Medications on File Prior to Visit   Medication Sig   • amLODIPine (NORVASC) 5 mg tablet Take 1 tablet (5 mg total) by mouth daily   • Cholecalciferol (VITAMIN D) 2000 UNITS CAPS Take by mouth  • losartan (COZAAR) 100 MG tablet Take 1 tablet (100 mg total) by mouth daily   • omeprazole (PriLOSEC) 40 MG capsule Take 1 capsule (40 mg total) by mouth daily   • Glycerin-Hypromellose- (DRY EYE RELIEF DROPS OP) Apply to eye (Patient not taking: Reported on 3/3/2023)   • predniSONE 10 mg tablet Take 3tabs daily for 3days then 2t daily for 3d then 1t daily for 3d (Patient not taking: Reported on 3/2/2023)       Objective     /70 (BP Location: Left arm, Patient Position: Sitting, Cuff Size: Adult)   Pulse 102   Temp (!) 97 °F (36 1 °C) (Skin)   Resp 16   Ht 5' 2" (1 575 m)   Wt 53 9 kg (118 lb 12 8 oz)   LMP 07/22/1986 (LMP Unknown) Comment: hysterectomy 30 years ago  SpO2 96%   BMI 21 73 kg/m²     Physical Exam  Vitals reviewed  Constitutional:       General: She is awake  She is not in acute distress  Appearance: Normal appearance  She is well-developed and well-groomed  She is not ill-appearing  Cardiovascular:      Rate and Rhythm: Normal rate and regular rhythm  Pulses: Normal pulses  Heart sounds: Normal heart sounds  No murmur heard  Pulmonary:      Effort: Pulmonary effort is normal       Breath sounds: Normal breath sounds  Musculoskeletal:         General: No tenderness  Normal range of motion  Cervical back: Full passive range of motion without pain and normal range of motion  No rigidity  No muscular tenderness  Normal range of motion        Thoracic back: Normal  No swelling, edema, tenderness or bony tenderness  Normal range of motion  Skin:     General: Skin is warm and dry  Findings: No erythema or rash  Neurological:      Mental Status: She is alert and oriented to person, place, and time  Deep Tendon Reflexes: Reflexes are normal and symmetric  Psychiatric:         Attention and Perception: Attention normal          Mood and Affect: Mood normal          Speech: Speech normal          Behavior: Behavior normal  Behavior is cooperative  Thought Content:  Thought content normal          Cognition and Memory: Cognition normal          Judgment: Judgment normal        IRAM Moore

## 2023-03-03 NOTE — ASSESSMENT & PLAN NOTE
Thoracic back pain with burning sensation when clothes touch the area for 2 days  No rash present  Presentation most suggestive of shingles, may be prodromal pain  Exam of back otherwise unremarkable  Would like to treat as shingles with valacyclovir and prn gabapentin  Pt advised to monitor area for rash  Offered f/u next week, she says she will call with an update early in the week to let me know how sx have progressed  F/u if worsening or not improving

## 2023-03-07 ENCOUNTER — TELEPHONE (OUTPATIENT)
Dept: FAMILY MEDICINE CLINIC | Facility: CLINIC | Age: 83
End: 2023-03-07

## 2023-03-07 NOTE — TELEPHONE ENCOUNTER
Pt saw you on 3/3 for back pain and she was calling to let you know how she was doing   Her back pain is very minimal and she did not develop a rash

## 2023-03-10 LAB
ALBUMIN SERPL ELPH-MCNC: 4.4 G/DL (ref 3.8–4.8)
ALPHA1 GLOB SERPL ELPH-MCNC: 0.3 G/DL (ref 0.2–0.3)
ALPHA2 GLOB SERPL ELPH-MCNC: 0.9 G/DL (ref 0.5–0.9)
BETA1 GLOB SERPL ELPH-MCNC: 0.5 G/DL (ref 0.4–0.6)
BETA2 GLOB SERPL ELPH-MCNC: 0.3 G/DL (ref 0.2–0.5)
BUN SERPL-MCNC: 16 MG/DL (ref 7–25)
BUN/CREAT SERPL: NORMAL (CALC) (ref 6–22)
CREAT SERPL-MCNC: 0.66 MG/DL (ref 0.6–0.95)
ERYTHROCYTE [SEDIMENTATION RATE] IN BLOOD BY WESTERGREN METHOD: 2 MM/H
GAMMA GLOB SERPL ELPH-MCNC: 0.8 G/DL (ref 0.8–1.7)
GFR/BSA.PRED SERPLBLD CYS-BASED-ARV: 89 ML/MIN/1.73M2
INTERPRETATION SERPL IFE-IMP: NORMAL
PROT PATTERN SERPL ELPH-IMP: NORMAL
PROT SERPL-MCNC: 7.2 G/DL (ref 6.1–8.1)
VIT B12 SERPL-MCNC: 321 PG/ML (ref 200–1100)

## 2023-04-26 ENCOUNTER — OFFICE VISIT (OUTPATIENT)
Dept: FAMILY MEDICINE CLINIC | Facility: CLINIC | Age: 83
End: 2023-04-26

## 2023-04-26 ENCOUNTER — HOSPITAL ENCOUNTER (OUTPATIENT)
Dept: RADIOLOGY | Facility: HOSPITAL | Age: 83
Discharge: HOME | End: 2023-04-26

## 2023-04-26 VITALS
HEART RATE: 100 BPM | WEIGHT: 120.2 LBS | OXYGEN SATURATION: 96 % | DIASTOLIC BLOOD PRESSURE: 70 MMHG | SYSTOLIC BLOOD PRESSURE: 160 MMHG | HEIGHT: 62 IN | BODY MASS INDEX: 22.12 KG/M2 | RESPIRATION RATE: 18 BRPM | TEMPERATURE: 98.1 F

## 2023-04-26 DIAGNOSIS — J06.9 ACUTE UPPER RESPIRATORY INFECTION: Primary | ICD-10-CM

## 2023-04-26 DIAGNOSIS — J06.9 ACUTE UPPER RESPIRATORY INFECTION: ICD-10-CM

## 2023-04-26 RX ORDER — BENZONATATE 200 MG/1
200 CAPSULE ORAL 3 TIMES DAILY PRN
Qty: 30 CAPSULE | Refills: 0 | Status: SHIPPED | OUTPATIENT
Start: 2023-04-26

## 2023-04-26 RX ORDER — AZITHROMYCIN 250 MG/1
TABLET, FILM COATED ORAL
Qty: 6 TABLET | Refills: 0 | Status: SHIPPED | OUTPATIENT
Start: 2023-04-26 | End: 2023-04-30

## 2023-04-26 NOTE — PROGRESS NOTES
FAMILY PRACTICE OFFICE VISIT       NAME: Tony Lou  AGE: [de-identified] y o  SEX: female       : 1940        MRN: [id number]    DATE: 2023  TIME: 10:36 AM    Assessment and Plan   1  Acute upper respiratory infection  -     XR chest pa & lateral; Future; Expected date: 2023  -     azithromycin (ZITHROMAX) 250 mg tablet; Take 2 tablets today then 1 tablet daily x 4 days  -     benzonatate (TESSALON) 200 MG capsule; Take 1 capsule (200 mg total) by mouth 3 (three) times a day as needed for cough                 Chief Complaint     Chief Complaint   Patient presents with   • Nasal Congestion     Patient is being seen for nasal congestion and cough  • Cough       History of Present Illness   Tony Lou is a [de-identified]y o -year-old female who is here for illness  First started runny nose last week  Felt ok over weekend  Monday started with cough  Moist  No sob  No fevers  Mucous thick and yellow in color  No ha or ear pain      Review of Systems   Review of Systems   Constitutional: Positive for fatigue  Negative for chills and fever  HENT: Positive for congestion, postnasal drip and rhinorrhea  Negative for ear pain and sore throat  Eyes: Negative for photophobia and visual disturbance  Respiratory: Positive for cough, chest tightness and wheezing  Negative for shortness of breath  Cardiovascular: Negative for chest pain and palpitations  Gastrointestinal: Negative for constipation, diarrhea, nausea and vomiting  Genitourinary: Negative for dysuria and frequency  Musculoskeletal: Negative for arthralgias and myalgias  Skin: Negative for rash  Neurological: Negative for dizziness, light-headedness and headaches  Hematological: Negative for adenopathy  Psychiatric/Behavioral: Negative for agitation, dysphoric mood and sleep disturbance  The patient is not nervous/anxious          Active Problem List     Patient Active Problem List   Diagnosis   • Acid reflux disease   • Essential hypertension   • Hyperlipidemia   • Spondylosis of cervical region without myelopathy or radiculopathy   • Thyroid nodule   • Vitamin D deficiency   • H/O cataract removal with insertion of prosthetic lens   • Benign essential tremor   • Trigeminal neuropathy   • Myogenic ptosis, left   • Abnormal CT scan, gallbladder   • Burning sensation         Past Medical History:  Past Medical History:   Diagnosis Date   • GERD (gastroesophageal reflux disease)    • Hyperlipidemia    • Hypertension    • Pneumonia    • Spinal stenosis    • Trapezius muscle spasm     last assessed - 29Jul2016       Past Surgical History:  Past Surgical History:   Procedure Laterality Date   • BREAST CYST ASPIRATION Right 2010   • CATARACT EXTRACTION, BILATERAL     • DEBRIDEMENT TENNIS ELBOW     • ELBOW SURGERY Right     tennis elbow   • FASCIOTOMY ELBOW Right     of the lateral epicondyle; last assessed - 10Oct2014   • HYSTERECTOMY      WIN-BSO   • OOPHORECTOMY Bilateral    • KS COLONOSCOPY FLX DX W/COLLJ SPEC WHEN PFRMD N/A 05/01/2017    Procedure: EGD AND COLONOSCOPY;  Surgeon: Sky Márquez MD;  Location: [facility];   Service: Gastroenterology       Family History:  Family History   Problem Relation Age of Onset   • Cancer Sister    • Colon cancer Sister 71   • Diabetes Sister    • Melanoma Mother    • Cirrhosis Father         alcoholic   • Stroke Father         CVA (cerebral vascular accident)   • Arrhythmia Father         pacemaker placement   • No Known Problems Maternal Grandmother    • No Known Problems Maternal Grandfather    • No Known Problems Paternal Grandmother    • No Known Problems Paternal Grandfather    • No Known Problems Sister    • No Known Problems Sister    • No Known Problems Sister    • No Known Problems Sister    • No Known Problems Maternal Aunt    • No Known Problems Maternal Aunt    • No Known Problems Maternal Aunt    • No Known Problems Maternal Aunt    • No Known Problems Maternal Aunt    • No Known Problems Maternal Aunt    • No Known Problems Paternal Aunt    • No Known Problems Paternal Aunt    • No Known Problems Paternal Aunt        Social History:  Social History     Socioeconomic History   • Marital status: /Civil Union     Spouse name: Not on file   • Number of children: 2   • Years of education: Not on file   • Highest education level: Not on file   Occupational History   • 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   Tobacco Use   • Smoking status: Former     Packs/day: 0 50     Types: Cigarettes     Start date: 1962     Passive exposure: Never   • Smokeless tobacco: Former   • Tobacco comments:     Quit June 2014 - 1/2 ppd for 40 years per [employer]   Vaping Use   • Vaping Use: Never used   Substance and Sexual Activity   • Alcohol use: No     Comment: Consumes alcohol occasionally per [employer]   • Drug use: No   • Sexual activity: Not Currently   Other Topics Concern   • Not on file   Social History Narrative    Two children - [place] [id number]     Social Determinants of Health     Financial Resource Strain: Low Risk    • Difficulty of Paying Living Expenses: Not hard at all   Food Insecurity: Not on file   Transportation Needs: No Transportation Needs   • Lack of Transportation (Medical): No   • Lack of Transportation (Non-Medical): No   Physical Activity: Not on file   Stress: Not on file   Social Connections: Not on file   Intimate Partner Violence: Not on file   Housing Stability: Not on file       Objective     Vitals:    04/26/23 0922   BP: 160/70   Pulse: 100   Resp: 18   Temp: 98 1 °F (36 7 °C)   SpO2: 96%     Wt Readings from Last 3 Encounters:   04/26/23 54 5 kg (120 lb 3 2 oz)   03/03/23 53 9 kg (118 lb 12 8 oz)   03/02/23 54 4 kg (120 lb)       Physical Exam  Vitals and nursing note reviewed  Constitutional:       Appearance: Normal appearance  HENT:      Head: Normocephalic and atraumatic        Right Ear: Tympanic membrane, ear canal and external ear normal       Left Ear: Tympanic membrane, ear canal and external ear normal       Nose: Congestion and rhinorrhea present  Mouth/Throat:      Pharynx: Posterior oropharyngeal erythema present  Eyes:      Conjunctiva/sclera: Conjunctivae normal    Cardiovascular:      Rate and Rhythm: Normal rate and regular rhythm  Heart sounds: Normal heart sounds  Pulmonary:      Effort: Pulmonary effort is normal       Breath sounds: Examination of the right-middle field reveals rhonchi  Examination of the left-middle field reveals rhonchi  Examination of the right-lower field reveals rhonchi  Examination of the left-lower field reveals rhonchi  Rhonchi present  Musculoskeletal:      Cervical back: Normal range of motion and neck supple  Neurological:      Mental Status: She is alert           Pertinent Laboratory/Diagnostic Studies:  Lab Results   Component Value Date    GLUCOSE 178 (H) 09/16/2016    BUN 16 03/08/2023    CREATININE 0 66 03/08/2023    CALCIUM 9 7 02/08/2023     10/11/2016    K 3 9 02/08/2023    CO2 31 02/08/2023     02/08/2023     Lab Results   Component Value Date    ALT 10 02/08/2023    AST 14 02/08/2023    ALKPHOS 92 02/08/2023    BILITOT 0 4 10/11/2016       Lab Results   Component Value Date    WBC 4 0 02/08/2023    HGB 14 3 02/08/2023    HCT 42 1 02/08/2023    MCV 95 2 02/08/2023     02/08/2023       No results found for: TSH    Lab Results   Component Value Date    CHOL 251 (H) 10/11/2016     Lab Results   Component Value Date    TRIG 98 08/03/2022     Lab Results   Component Value Date    HDL 78 08/03/2022     Lab Results   Component Value Date    LDLCALC 128 (H) 08/03/2022     No results found for: HGBA1C    Results for orders placed or performed in visit on 03/08/23   Protein electrophoresis, serum   Result Value Ref Range    Protein, Total 7 2 6 1 - 8 1 g/dL    Albumin, Electrophoresis 4 4 3 8 - 4 8 g/dL    Alpha-1 Globulin 0 3 0 2 - 0 3 g/dL    Alpha-2 Globulin 0 9 0 5 - 0 9 g/dL    Beta 1 Globulin 0 5 0 4 - 0 6 g/dL    Beta 2 Globulin 0 3 0 2 - 0 5 g/dL    Gamma Globulin 0 8 0 8 - 1 7 g/dL    Interpretation Normal Electrophoretic Pattern    BUN/Creatinine Ratio   Result Value Ref Range    BUN 16 7 - 25 mg/dL    Creatinine 0 66 0 60 - 0 95 mg/dL    eGFR 89 > OR = 60 mL/min/1 73m2    SL AMB BUN/CREATININE RATIO NOT APPLICABLE 6 - 22 (calc)   Sedimentation rate, automated   Result Value Ref Range    Sedimentation Rate 2 < OR = 30 mm/h   Immunofixation, Serum(Reflex Only-Do Not Order)   Result Value Ref Range    Interpretation: Normal pattern  No monoclonal proteins detected  Vitamin B12   Result Value Ref Range    Vitamin B-12 321 200 - 1,100 pg/mL       Orders Placed This Encounter   Procedures   • XR chest pa & lateral       ALLERGIES:  Allergies   Allergen Reactions   • Atorvastatin Edema   • Pneumococcal Vaccines Edema       Current Medications     Current Outpatient Medications   Medication Sig Dispense Refill   • amLODIPine (NORVASC) 5 mg tablet Take 1 tablet (5 mg total) by mouth daily 90 tablet 3   • azithromycin (ZITHROMAX) 250 mg tablet Take 2 tablets today then 1 tablet daily x 4 days 6 tablet 0   • benzonatate (TESSALON) 200 MG capsule Take 1 capsule (200 mg total) by mouth 3 (three) times a day as needed for cough 30 capsule 0   • Cholecalciferol (VITAMIN D) 2000 UNITS CAPS Take by mouth  • gabapentin (Neurontin) 100 mg capsule Take 1 capsule (100 mg total) by mouth 2 (two) times a day as needed (pain) 20 capsule 0   • Glycerin-Hypromellose- (DRY EYE RELIEF DROPS OP) Apply to eye     • losartan (COZAAR) 100 MG tablet Take 1 tablet (100 mg total) by mouth daily 90 tablet 3   • valACYclovir (VALTREX) 1,000 mg tablet Take 1 tablet (1,000 mg total) by mouth 3 (three) times a day for 7 days (Patient not taking: Reported on 4/26/2023) 21 tablet 0     No current facility-administered medications for this visit           Health Maintenance     Health Maintenance   Topic Date Due   • SLP PLAN OF CARE  Never done   • Osteoporosis Screening  Never done   • COVID-19 Vaccine (5 - Booster for Pfizer series) 10/18/2022   • Pneumococcal Vaccine: 65+ Years (2 - PCV) 07/05/2032 (Originally 10/12/2011)   • Medicare Annual Wellness Visit (AWV)  08/09/2023   • Fall Risk  02/14/2024   • Depression Screening  02/14/2024   • Urinary Incontinence Screening  02/14/2024   • BMI: Adult  04/26/2024   • Breast Cancer Screening: Mammogram  09/09/2024   • Hepatitis C Screening  Completed   • Influenza Vaccine  Completed   • HIB Vaccine  Aged Out   • IPV Vaccine  Aged Out   • Hepatitis A Vaccine  Aged Out   • Meningococcal ACWY Vaccine  Aged Out   • HPV Vaccine  Aged Out   • Colorectal Cancer Screening  Discontinued     Immunization History   Administered Date(s) Administered   • COVID-19 PFIZER VACCINE 0 3 ML IM 01/21/2021, 02/09/2021, 10/23/2021   • COVID-19 Pfizer vac (Jonnathan-sucrose, gray cap) 12 yr+ IM 08/23/2022   • COVID-19, unspecified 08/23/2022   • INFLUENZA 10/25/2016, 10/24/2017, 11/01/2018, 11/24/2018, 10/24/2019, 11/09/2021, 10/24/2022   • Influenza Split High Dose Preservative Free IM 10/10/2014, 10/13/2015, 10/25/2016, 11/01/2018, 11/07/2019   • Influenza, high dose seasonal 0 7 mL 10/24/2019, 10/24/2022   • Influenza, recombinant, quadrivalent,injectable, preservative free 11/03/2020   • Influenza, seasonal, injectable 10/08/2013   • Influenza, seasonal, injectable, preservative free 11/24/2018   • Pneumococcal Polysaccharide PPV23 10/12/2010   • Zoster 05/31/2017          IRAM Lee

## 2023-06-07 ENCOUNTER — VBI (OUTPATIENT)
Dept: ADMINISTRATIVE | Facility: OTHER | Age: 83
End: 2023-06-07

## 2023-07-27 ENCOUNTER — FOLLOW UP (OUTPATIENT)
Dept: URBAN - METROPOLITAN AREA CLINIC 6 | Facility: CLINIC | Age: 83
End: 2023-07-27

## 2023-07-27 DIAGNOSIS — H04.123: ICD-10-CM

## 2023-07-27 DIAGNOSIS — H35.363: ICD-10-CM

## 2023-07-27 DIAGNOSIS — Z96.1: ICD-10-CM

## 2023-07-27 DIAGNOSIS — H35.372: ICD-10-CM

## 2023-07-27 PROCEDURE — 92012 INTRM OPH EXAM EST PATIENT: CPT

## 2023-07-27 PROCEDURE — 92134 CPTRZ OPH DX IMG PST SGM RTA: CPT

## 2023-07-27 ASSESSMENT — TONOMETRY
OD_IOP_MMHG: 13
OS_IOP_MMHG: 11

## 2023-07-27 ASSESSMENT — VISUAL ACUITY
OD_CC: 20/40-1
OS_CC: 20/40

## 2023-08-02 ENCOUNTER — RA CDI HCC (OUTPATIENT)
Dept: OTHER | Facility: HOSPITAL | Age: 83
End: 2023-08-02

## 2023-08-02 NOTE — PROGRESS NOTES
[address] [facility] coding opportunities       Chart reviewed, no opportunity found: [address]        Patients Insurance     Medicare Insurance: [facility]

## 2023-08-09 LAB
ALBUMIN SERPL-MCNC: 4.5 G/DL (ref 3.6–5.1)
ALBUMIN/GLOB SERPL: 1.9 (CALC) (ref 1–2.5)
ALP SERPL-CCNC: 92 U/L (ref 37–153)
ALT SERPL-CCNC: 11 U/L (ref 6–29)
AST SERPL-CCNC: 17 U/L (ref 10–35)
BASOPHILS # BLD AUTO: 20 CELLS/UL (ref 0–200)
BASOPHILS NFR BLD AUTO: 0.4 %
BILIRUB SERPL-MCNC: 0.6 MG/DL (ref 0.2–1.2)
BUN SERPL-MCNC: 14 MG/DL (ref 7–25)
BUN/CREAT SERPL: NORMAL (CALC) (ref 6–22)
CALCIUM SERPL-MCNC: 9.9 MG/DL (ref 8.6–10.4)
CHLORIDE SERPL-SCNC: 105 MMOL/L (ref 98–110)
CHOLEST SERPL-MCNC: 227 MG/DL
CHOLEST/HDLC SERPL: 2.8 (CALC)
CO2 SERPL-SCNC: 28 MMOL/L (ref 20–32)
CREAT SERPL-MCNC: 0.72 MG/DL (ref 0.6–0.95)
EOSINOPHIL # BLD AUTO: 60 CELLS/UL (ref 15–500)
EOSINOPHIL NFR BLD AUTO: 1.2 %
ERYTHROCYTE [DISTWIDTH] IN BLOOD BY AUTOMATED COUNT: 13.1 % (ref 11–15)
GFR/BSA.PRED SERPLBLD CYS-BASED-ARV: 84 ML/MIN/1.73M2
GLOBULIN SER CALC-MCNC: 2.4 G/DL (CALC) (ref 1.9–3.7)
GLUCOSE SERPL-MCNC: 84 MG/DL (ref 65–99)
HCT VFR BLD AUTO: 43.2 % (ref 35–45)
HDLC SERPL-MCNC: 81 MG/DL
HGB BLD-MCNC: 14.7 G/DL (ref 11.7–15.5)
LDLC SERPL CALC-MCNC: 124 MG/DL (CALC)
LYMPHOCYTES # BLD AUTO: 1050 CELLS/UL (ref 850–3900)
LYMPHOCYTES NFR BLD AUTO: 21 %
MCH RBC QN AUTO: 32.4 PG (ref 27–33)
MCHC RBC AUTO-ENTMCNC: 34 G/DL (ref 32–36)
MCV RBC AUTO: 95.2 FL (ref 80–100)
MONOCYTES # BLD AUTO: 345 CELLS/UL (ref 200–950)
MONOCYTES NFR BLD AUTO: 6.9 %
NEUTROPHILS # BLD AUTO: 3525 CELLS/UL (ref 1500–7800)
NEUTROPHILS NFR BLD AUTO: 70.5 %
NONHDLC SERPL-MCNC: 146 MG/DL (CALC)
PLATELET # BLD AUTO: 299 THOUSAND/UL (ref 140–400)
PMV BLD REES-ECKER: 10.8 FL (ref 7.5–12.5)
POTASSIUM SERPL-SCNC: 4.2 MMOL/L (ref 3.5–5.3)
PROT SERPL-MCNC: 6.9 G/DL (ref 6.1–8.1)
RBC # BLD AUTO: 4.54 MILLION/UL (ref 3.8–5.1)
SODIUM SERPL-SCNC: 141 MMOL/L (ref 135–146)
TRIGL SERPL-MCNC: 111 MG/DL
WBC # BLD AUTO: 5 THOUSAND/UL (ref 3.8–10.8)

## 2023-08-14 ENCOUNTER — OFFICE VISIT (OUTPATIENT)
Dept: FAMILY MEDICINE CLINIC | Facility: CLINIC | Age: 83
End: 2023-08-14
Payer: COMMERCIAL

## 2023-08-14 VITALS
DIASTOLIC BLOOD PRESSURE: 62 MMHG | WEIGHT: 117 LBS | HEIGHT: 62 IN | HEART RATE: 100 BPM | TEMPERATURE: 96.8 F | SYSTOLIC BLOOD PRESSURE: 138 MMHG | OXYGEN SATURATION: 99 % | BODY MASS INDEX: 21.53 KG/M2 | RESPIRATION RATE: 16 BRPM

## 2023-08-14 DIAGNOSIS — I10 ESSENTIAL HYPERTENSION: ICD-10-CM

## 2023-08-14 DIAGNOSIS — E78.2 MIXED HYPERLIPIDEMIA: ICD-10-CM

## 2023-08-14 DIAGNOSIS — Z00.00 MEDICARE ANNUAL WELLNESS VISIT, SUBSEQUENT: Primary | ICD-10-CM

## 2023-08-14 PROCEDURE — G0439 PPPS, SUBSEQ VISIT: HCPCS | Performed by: NURSE PRACTITIONER

## 2023-08-14 RX ORDER — LOSARTAN POTASSIUM 100 MG/1
100 TABLET ORAL DAILY
Qty: 90 TABLET | Refills: 3 | Status: SHIPPED | OUTPATIENT
Start: 2023-08-14

## 2023-08-14 NOTE — PROGRESS NOTES
Assessment and Plan:     Problem List Items Addressed This Visit        Cardiovascular and Mediastinum    Essential hypertension    Relevant Medications    losartan (COZAAR) 100 MG tablet    Other Relevant Orders    Comprehensive metabolic panel    Lipid Panel with Direct LDL reflex       Other    Medicare annual wellness visit, subsequent - Primary    Hyperlipidemia    Relevant Orders    Comprehensive metabolic panel    Lipid Panel with Direct LDL reflex       Depression Screening and Follow-up Plan: Patient was screened for depression during today's encounter. They screened negative with a PHQ-2 score of 0. Preventive health issues were discussed with patient, and age appropriate screening tests were ordered as noted in patient's After Visit Summary. Personalized health advice and appropriate referrals for health education or preventive services given if needed, as noted in patient's After Visit Summary. History of Present Illness:     Patient presents for a Medicare Wellness Visit    Here for medicare wellness exam  Needs refills  Feels well  Labs reviewed       Patient Care Team:  IRAM Martinez as PCP - General (Family Medicine)  RANDY Williamson MD Jeannett Mead, MD as Endoscopist     Review of Systems:     Review of Systems   Constitutional: Negative for fatigue and fever. HENT: Negative for congestion, postnasal drip and rhinorrhea. Eyes: Negative for photophobia and visual disturbance. Respiratory: Negative for cough, shortness of breath and wheezing. Cardiovascular: Negative for chest pain and palpitations. Gastrointestinal: Negative for constipation, diarrhea, nausea and vomiting. Genitourinary: Negative for dysuria and frequency. Musculoskeletal: Negative for arthralgias and myalgias. Skin: Negative for rash. Neurological: Negative for dizziness, light-headedness and headaches. Hematological: Negative for adenopathy.    Psychiatric/Behavioral: Negative for sleep disturbance. The patient is not nervous/anxious. Problem List:     Patient Active Problem List   Diagnosis   • Acid reflux disease   • Essential hypertension   • Hyperlipidemia   • Spondylosis of cervical region without myelopathy or radiculopathy   • Thyroid nodule   • Vitamin D deficiency   • Medicare annual wellness visit, subsequent   • H/O cataract removal with insertion of prosthetic lens   • Benign essential tremor   • Trigeminal neuropathy   • Myogenic ptosis, left   • Abnormal CT scan, gallbladder   • Burning sensation      Past Medical and Surgical History:     Past Medical History:   Diagnosis Date   • GERD (gastroesophageal reflux disease)    • Hyperlipidemia    • Hypertension    • Pneumonia    • Spinal stenosis    • Trapezius muscle spasm     last assessed - 29Jul2016     Past Surgical History:   Procedure Laterality Date   • BREAST CYST ASPIRATION Right 2010   • CATARACT EXTRACTION, BILATERAL     • DEBRIDEMENT TENNIS ELBOW     • ELBOW SURGERY Right     tennis elbow   • FASCIOTOMY ELBOW Right     of the lateral epicondyle; last assessed - 10Oct2014   • HYSTERECTOMY      WIN-BSO   • OOPHORECTOMY Bilateral    • SC COLONOSCOPY FLX DX W/COLLJ SPEC WHEN PFRMD N/A 05/01/2017    Procedure: EGD AND COLONOSCOPY;  Surgeon: Briseida Marlow MD;  Location: [facility];   Service: Gastroenterology      Family History:     Family History   Problem Relation Age of Onset   • Cancer Sister    • Colon cancer Sister 71   • Diabetes Sister    • Melanoma Mother    • Cirrhosis Father         alcoholic   • Stroke Father         CVA (cerebral vascular accident)   • Arrhythmia Father         pacemaker placement   • No Known Problems Maternal Grandmother    • No Known Problems Maternal Grandfather    • No Known Problems Paternal Grandmother    • No Known Problems Paternal Grandfather    • No Known Problems Sister    • No Known Problems Sister    • No Known Problems Sister    • No Known Problems Sister • No Known Problems Maternal Aunt    • No Known Problems Maternal Aunt    • No Known Problems Maternal Aunt    • No Known Problems Maternal Aunt    • No Known Problems Maternal Aunt    • No Known Problems Maternal Aunt    • No Known Problems Paternal Aunt    • No Known Problems Paternal Aunt    • No Known Problems Paternal Aunt       Social History:     Social History     Socioeconomic History   • Marital status: /Civil Union     Spouse name: None   • Number of children: 2   • Years of education: None   • Highest education level: None   Occupational History   • Occupation: Retired -  - [employer]   Tobacco Use   • Smoking status: Former     Packs/day: 0.50     Types: Cigarettes     Start date: 1962     Passive exposure: Never   • Smokeless tobacco: Former   • Tobacco comments:     Quit June 2014 - 1/2 ppd for 40 years per [employer]   Vaping Use   • Vaping Use: Never used   Substance and Sexual Activity   • Alcohol use: No     Comment: Consumes alcohol occasionally per [employer]   • Drug use: No   • Sexual activity: Not Currently   Other Topics Concern   • None   Social History Narrative    Two children - [address] [place] [address]     Social Determinants of Health     Financial Resource Strain: Low Risk  (8/14/2023)    Overall Financial Resource Strain (CARDIA)    • Difficulty of Paying Living Expenses: Not hard at all   Food Insecurity: Not on file   Transportation Needs: No Transportation Needs (8/14/2023)    PRAPARE - Transportation    • Lack of Transportation (Medical): No    • Lack of Transportation (Non-Medical):  No   Physical Activity: Not on file   Stress: Not on file   Social Connections: Not on file   Intimate Partner Violence: Not on file   Housing Stability: Not on file      Medications and Allergies:     Current Outpatient Medications   Medication Sig Dispense Refill   • amLODIPine (NORVASC) 5 mg tablet Take 1 tablet (5 mg total) by mouth daily 90 tablet 3   • Cholecalciferol (VITAMIN D) 2000 UNITS CAPS Take by mouth. • Glycerin-Hypromellose- (DRY EYE RELIEF DROPS OP) Apply to eye     • losartan (COZAAR) 100 MG tablet Take 1 tablet (100 mg total) by mouth daily 90 tablet 3     No current facility-administered medications for this visit. Allergies   Allergen Reactions   • Atorvastatin Edema   • Pneumococcal Vaccines Edema      Immunizations:     Immunization History   Administered Date(s) Administered   • COVID-19 PFIZER VACCINE 0.3 ML IM 01/21/2021, 02/09/2021, 10/23/2021   • COVID-19 Pfizer vac (Jonnathan-sucrose, gray cap) 12 yr+ IM 08/23/2022   • COVID-19, unspecified 08/23/2022   • INFLUENZA 10/25/2016, 10/24/2017, 11/01/2018, 11/24/2018, 10/24/2019, 11/09/2021, 10/24/2022   • Influenza Split High Dose Preservative Free IM 10/10/2014, 10/13/2015, 10/25/2016, 11/01/2018, 11/07/2019   • Influenza, high dose seasonal 0.7 mL 10/24/2019, 10/24/2022   • Influenza, recombinant, quadrivalent,injectable, preservative free 11/03/2020   • Influenza, seasonal, injectable 10/08/2013   • Influenza, seasonal, injectable, preservative free 11/24/2018   • Pneumococcal Polysaccharide PPV23 10/12/2010   • Zoster 05/31/2017      Health Maintenance:         Topic Date Due   • Breast Cancer Screening: Mammogram  09/09/2024   • Hepatitis C Screening  Completed   • Colorectal Cancer Screening  Discontinued         Topic Date Due   • Influenza Vaccine (1) 09/01/2023      Medicare Screening Tests and Risk Assessments:     Ramila Hicks is here for her Subsequent Wellness visit. Last Medicare Wellness visit information reviewed, patient interviewed and updates made to the record today. Health Risk Assessment:   Patient rates overall health as good. Patient feels that their physical health rating is same. Patient is satisfied with their life. Eyesight was rated as same. Hearing was rated as same. Patient feels that their emotional and mental health rating is same. Patients states they are never, rarely angry. Patient states they are never, rarely unusually tired/fatigued. Pain experienced in the last 7 days has been none. Patient states that she has experienced no weight loss or gain in last 6 months. Depression Screening:   PHQ-2 Score: 0      Fall Risk Screening: In the past year, patient has experienced: no history of falling in past year      Urinary Incontinence Screening:   Patient has not leaked urine accidently in the last six months. Home Safety:  Patient does not have trouble with stairs inside or outside of their home. Patient has working smoke alarms and has working carbon monoxide detector. Home safety hazards include: none. Nutrition:   Current diet is Regular. Medications:   Patient is currently taking over-the-counter supplements. OTC medications include: see medication list. Patient is able to manage medications. Activities of Daily Living (ADLs)/Instrumental Activities of Daily Living (IADLs):   Walk and transfer into and out of bed and chair?: Yes  Dress and groom yourself?: Yes    Bathe or shower yourself?: Yes    Feed yourself?  Yes  Do your laundry/housekeeping?: Yes  Manage your money, pay your bills and track your expenses?: Yes  Make your own meals?: Yes    Do your own shopping?: Yes    Previous Hospitalizations:   Any hospitalizations or ED visits within the last 12 months?: Yes    How many hospitalizations have you had in the last year?: 1-2    Advance Care Planning:   Living will: No    Durable POA for healthcare: No    Advanced directive: No    Advanced directive counseling given: Yes    Five wishes given: No    Patient declined ACP directive: No    End of Life Decisions reviewed with patient: Yes      Cognitive Screening:   Provider or family/friend/caregiver concerned regarding cognition?: No    PREVENTIVE SCREENINGS      Cardiovascular Screening:    General: Screening Not Indicated and History Lipid Disorder      Diabetes Screening:     General: Screening Current Colorectal Cancer Screening:     General: Screening Not Indicated      Breast Cancer Screening:     General: Screening Current      Cervical Cancer Screening:    General: Screening Not Indicated      Osteoporosis Screening:    General: Risks and Benefits Discussed    Due for: DXA Axial      Abdominal Aortic Aneurysm (AAA) Screening:        General: Screening Not Indicated      Lung Cancer Screening:     General: Screening Not Indicated      Hepatitis C Screening:    General: Screening Current    Screening, Brief Intervention, and Referral to Treatment (SBIRT)    Screening  Typical number of drinks in a day: 0  Typical number of drinks in a week: 0  Interpretation: Low risk drinking behavior. Single Item Drug Screening:  How often have you used an illegal drug (including marijuana) or a prescription medication for non-medical reasons in the past year? never    Single Item Drug Screen Score: 0  Interpretation: Negative screen for possible drug use disorder    Brief Intervention  Alcohol & drug use screenings were reviewed. No concerns regarding substance use disorder identified. Other Counseling Topics:   Car/seat belt/driving safety, skin self-exam, sunscreen and calcium and vitamin D intake and regular weightbearing exercise. No results found. Physical Exam:     /62   Pulse 100   Temp (!) 96.8 °F (36 °C)   Resp 16   Ht 5' 1.69" (1.567 m)   Wt 53.1 kg (117 lb)   LMP 07/22/1986 (LMP Unknown) Comment: hysterectomy 30 years ago  SpO2 99%   BMI 21.61 kg/m²     Physical Exam  Vitals and nursing note reviewed. Constitutional:       Appearance: Normal appearance. HENT:      Head: Normocephalic and atraumatic.       Right Ear: Tympanic membrane, ear canal and external ear normal.      Left Ear: Tympanic membrane, ear canal and external ear normal.      Nose: Nose normal.      Mouth/Throat:      Mouth: Mucous membranes are moist.   Eyes:      Conjunctiva/sclera: Conjunctivae normal. Pupils: Pupils are equal, round, and reactive to light. Neck:      Thyroid: No thyroid mass, thyromegaly or thyroid tenderness. Vascular: No carotid bruit. Cardiovascular:      Rate and Rhythm: Normal rate and regular rhythm. Heart sounds: Normal heart sounds. Pulmonary:      Effort: Pulmonary effort is normal.      Breath sounds: Normal breath sounds. Abdominal:      General: Bowel sounds are normal.      Palpations: Abdomen is soft. Musculoskeletal:         General: Normal range of motion. Cervical back: Normal range of motion and neck supple. Skin:     General: Skin is warm and dry. Capillary Refill: Capillary refill takes less than 2 seconds. Neurological:      General: No focal deficit present. Mental Status: She is alert and oriented to person, place, and time. Psychiatric:         Mood and Affect: Mood normal.         Behavior: Behavior normal.         Thought Content:  Thought content normal.         Judgment: Judgment normal.          IRAM Quezada

## 2023-08-14 NOTE — PATIENT INSTRUCTIONS
Medicare Preventive Visit Patient Instructions  Thank you for completing your Welcome to Medicare Visit or Medicare Annual Wellness Visit today. Your next wellness visit will be due in one year (8/14/2024). The screening/preventive services that you may require over the next 5-10 years are detailed below. Some tests may not apply to you based off risk factors and/or age. Screening tests ordered at today's visit but not completed yet may show as past due. Also, please note that scanned in results may not display below. Preventive Screenings:  Service Recommendations Previous Testing/Comments   Colorectal Cancer Screening  * Colonoscopy    * Fecal Occult Blood Test (FOBT)/Fecal Immunochemical Test (FIT)  * Fecal DNA/Cologuard Test  * Flexible Sigmoidoscopy Age: 43-73 years old   Colonoscopy: every 10 years (may be performed more frequently if at higher risk)  OR  FOBT/FIT: every 1 year  OR  Cologuard: every 3 years  OR  Sigmoidoscopy: every 5 years  Screening may be recommended earlier than age 39 if at higher risk for colorectal cancer. Also, an individualized decision between you and your healthcare provider will decide whether screening between the ages of 77-80 would be appropriate. Colonoscopy: 05/01/2017  FOBT/FIT: Not on file  Cologuard: Not on file  Sigmoidoscopy: Not on file          Breast Cancer Screening Age: 36 years old  Frequency: every 1-2 years  Not required if history of left and right mastectomy Mammogram: 09/09/2022    Screening Current   Cervical Cancer Screening Between the ages of 21-29, pap smear recommended once every 3 years. Between the ages of 32-69, can perform pap smear with HPV co-testing every 5 years.    Recommendations may differ for women with a history of total hysterectomy, cervical cancer, or abnormal pap smears in past. Pap Smear: Not on file    Screening Not Indicated   Hepatitis C Screening Once for adults born between 1945 and 1965  More frequently in patients at high risk for Hepatitis C Hep C Antibody: 01/23/2019    Screening Current   Diabetes Screening 1-2 times per year if you're at risk for diabetes or have pre-diabetes Fasting glucose: No results in last 5 years (No results in last 5 years)  A1C: No results in last 5 years (No results in last 5 years)  Screening Current   Cholesterol Screening Once every 5 years if you don't have a lipid disorder. May order more often based on risk factors. Lipid panel: 08/09/2023    Screening Not Indicated  History Lipid Disorder     Other Preventive Screenings Covered by Medicare:  1. Abdominal Aortic Aneurysm (AAA) Screening: covered once if your at risk. You're considered to be at risk if you have a family history of AAA. 2. Lung Cancer Screening: covers low dose CT scan once per year if you meet all of the following conditions: (1) Age 48-67; (2) No signs or symptoms of lung cancer; (3) Current smoker or have quit smoking within the last 15 years; (4) You have a tobacco smoking history of at least 20 pack years (packs per day multiplied by number of years you smoked); (5) You get a written order from a healthcare provider. 3. Glaucoma Screening: covered annually if you're considered high risk: (1) You have diabetes OR (2) Family history of glaucoma OR (3)  aged 48 and older OR (3)  American aged 72 and older  3. Osteoporosis Screening: covered every 2 years if you meet one of the following conditions: (1) You're estrogen deficient and at risk for osteoporosis based off medical history and other findings; (2) Have a vertebral abnormality; (3) On glucocorticoid therapy for more than 3 months; (4) Have primary hyperparathyroidism; (5) On osteoporosis medications and need to assess response to drug therapy. · Last bone density test (DXA Scan): Not on file. 5. HIV Screening: covered annually if you're between the age of 14-79.  Also covered annually if you are younger than 13 and older than 72 with risk factors for HIV infection. For pregnant patients, it is covered up to 3 times per pregnancy. Immunizations:  Immunization Recommendations   Influenza Vaccine Annual influenza vaccination during flu season is recommended for all persons aged >= 6 months who do not have contraindications   Pneumococcal Vaccine   * Pneumococcal conjugate vaccine = PCV13 (Prevnar 13), PCV15 (Vaxneuvance), PCV20 (Prevnar 20)  * Pneumococcal polysaccharide vaccine = PPSV23 (Pneumovax) Adults 20-63 years old: 1-3 doses may be recommended based on certain risk factors  Adults 72 years old: 1-2 doses may be recommended based off what pneumonia vaccine you previously received   Hepatitis B Vaccine 3 dose series if at intermediate or high risk (ex: diabetes, end stage renal disease, liver disease)   Tetanus (Td) Vaccine - COST NOT COVERED BY MEDICARE PART B Following completion of primary series, a booster dose should be given every 10 years to maintain immunity against tetanus. Td may also be given as tetanus wound prophylaxis. Tdap Vaccine - COST NOT COVERED BY MEDICARE PART B Recommended at least once for all adults. For pregnant patients, recommended with each pregnancy. Shingles Vaccine (Shingrix) - COST NOT COVERED BY MEDICARE PART B  2 shot series recommended in those aged 48 and above     Health Maintenance Due:      Topic Date Due   • Breast Cancer Screening: Mammogram  09/09/2024   • Hepatitis C Screening  Completed   • Colorectal Cancer Screening  Discontinued     Immunizations Due:      Topic Date Due   • COVID-19 Vaccine (6 - Pfizer series) 10/18/2022   • Influenza Vaccine (1) 09/01/2023     Advance Directives   What are advance directives? Advance directives are legal documents that state your wishes and plans for medical care. These plans are made ahead of time in case you lose your ability to make decisions for yourself.  Advance directives can apply to any medical decision, such as the treatments you want, and if you want to donate organs. What are the types of advance directives? There are many types of advance directives, and each state has rules about how to use them. You may choose a combination of any of the following:  · Living will: This is a written record of the treatment you want. You can also choose which treatments you do not want, which to limit, and which to stop at a certain time. This includes surgery, medicine, IV fluid, and tube feedings. · Durable power of  for healthcare [facility]): This is a written record that states who you want to make healthcare choices for you when you are unable to make them for yourself. This person, called a proxy, is usually a family member or a friend. You may choose more than 1 proxy. · Do not resuscitate (DNR) order:  A DNR order is used in case your heart stops beating or you stop breathing. It is a request not to have certain forms of treatment, such as CPR. A DNR order may be included in other types of advance directives. · Medical directive: This covers the care that you want if you are in a coma, near death, or unable to make decisions for yourself. You can list the treatments you want for each condition. Treatment may include pain medicine, surgery, blood transfusions, dialysis, IV or tube feedings, and a ventilator (breathing machine). · Values history: This document has questions about your views, beliefs, and how you feel and think about life. This information can help others choose the care that you would choose. Why are advance directives important? An advance directive helps you control your care. Although spoken wishes may be used, it is better to have your wishes written down. Spoken wishes can be misunderstood, or not followed. Treatments may be given even if you do not want them. An advance directive may make it easier for your family to make difficult choices about your care.        © Copyright [employer] 2018 Information is for End User's use only and may not be sold, redistributed or otherwise used for commercial purposes.  All illustrations and images included in CareNotes® are the copyrighted property of [employer] or [address] [place] [address]

## 2023-08-29 ENCOUNTER — VBI (OUTPATIENT)
Dept: ADMINISTRATIVE | Facility: OTHER | Age: 83
End: 2023-08-29

## 2023-10-13 PROBLEM — Z00.00 MEDICARE ANNUAL WELLNESS VISIT, SUBSEQUENT: Status: RESOLVED | Noted: 2022-08-09 | Resolved: 2023-10-13

## 2023-11-21 DIAGNOSIS — I10 ESSENTIAL HYPERTENSION: ICD-10-CM

## 2023-11-21 RX ORDER — AMLODIPINE BESYLATE 5 MG/1
5 TABLET ORAL DAILY
Qty: 90 TABLET | Refills: 1 | Status: SHIPPED | OUTPATIENT
Start: 2023-11-21

## 2024-02-02 ENCOUNTER — FOLLOW UP (OUTPATIENT)
Dept: URBAN - METROPOLITAN AREA CLINIC 6 | Facility: CLINIC | Age: 84
End: 2024-02-02

## 2024-02-02 DIAGNOSIS — H43.813: ICD-10-CM

## 2024-02-02 DIAGNOSIS — H35.373: ICD-10-CM

## 2024-02-02 DIAGNOSIS — H04.123: ICD-10-CM

## 2024-02-02 DIAGNOSIS — H35.363: ICD-10-CM

## 2024-02-02 DIAGNOSIS — Z96.1: ICD-10-CM

## 2024-02-02 PROCEDURE — 92134 CPTRZ OPH DX IMG PST SGM RTA: CPT

## 2024-02-02 PROCEDURE — 92014 COMPRE OPH EXAM EST PT 1/>: CPT

## 2024-02-02 ASSESSMENT — TONOMETRY
OS_IOP_MMHG: 11
OD_IOP_MMHG: 11

## 2024-02-02 ASSESSMENT — VISUAL ACUITY
OS_CC: 20/30
OD_CC: 20/40

## 2024-02-16 LAB
ALBUMIN SERPL-MCNC: 4.4 G/DL (ref 3.6–5.1)
ALBUMIN/GLOB SERPL: 1.7 (CALC) (ref 1–2.5)
ALP SERPL-CCNC: 104 U/L (ref 37–153)
ALT SERPL-CCNC: 10 U/L (ref 6–29)
AST SERPL-CCNC: 16 U/L (ref 10–35)
BILIRUB SERPL-MCNC: 0.5 MG/DL (ref 0.2–1.2)
BUN SERPL-MCNC: 18 MG/DL (ref 7–25)
BUN/CREAT SERPL: NORMAL (CALC) (ref 6–22)
CALCIUM SERPL-MCNC: 9.8 MG/DL (ref 8.6–10.4)
CHLORIDE SERPL-SCNC: 105 MMOL/L (ref 98–110)
CHOLEST SERPL-MCNC: 248 MG/DL
CHOLEST/HDLC SERPL: 2.7 (CALC)
CO2 SERPL-SCNC: 28 MMOL/L (ref 20–32)
CREAT SERPL-MCNC: 0.74 MG/DL (ref 0.6–0.95)
GFR/BSA.PRED SERPLBLD CYS-BASED-ARV: 81 ML/MIN/1.73M2
GLOBULIN SER CALC-MCNC: 2.6 G/DL (CALC) (ref 1.9–3.7)
GLUCOSE SERPL-MCNC: 88 MG/DL (ref 65–99)
HDLC SERPL-MCNC: 92 MG/DL
LDLC SERPL CALC-MCNC: 138 MG/DL (CALC)
NONHDLC SERPL-MCNC: 156 MG/DL (CALC)
POTASSIUM SERPL-SCNC: 4 MMOL/L (ref 3.5–5.3)
PROT SERPL-MCNC: 7 G/DL (ref 6.1–8.1)
SODIUM SERPL-SCNC: 142 MMOL/L (ref 135–146)
TRIGL SERPL-MCNC: 83 MG/DL

## 2024-02-23 ENCOUNTER — OFFICE VISIT (OUTPATIENT)
Dept: FAMILY MEDICINE CLINIC | Facility: CLINIC | Age: 84
End: 2024-02-23
Payer: COMMERCIAL

## 2024-02-23 VITALS
HEIGHT: 62 IN | TEMPERATURE: 97.2 F | HEART RATE: 81 BPM | WEIGHT: 124 LBS | SYSTOLIC BLOOD PRESSURE: 136 MMHG | BODY MASS INDEX: 22.82 KG/M2 | RESPIRATION RATE: 17 BRPM | DIASTOLIC BLOOD PRESSURE: 64 MMHG | OXYGEN SATURATION: 99 %

## 2024-02-23 DIAGNOSIS — E55.9 VITAMIN D DEFICIENCY: ICD-10-CM

## 2024-02-23 DIAGNOSIS — Z13.820 ENCOUNTER FOR OSTEOPOROSIS SCREENING IN ASYMPTOMATIC POSTMENOPAUSAL PATIENT: ICD-10-CM

## 2024-02-23 DIAGNOSIS — I10 ESSENTIAL HYPERTENSION: Primary | ICD-10-CM

## 2024-02-23 DIAGNOSIS — Z78.0 ENCOUNTER FOR OSTEOPOROSIS SCREENING IN ASYMPTOMATIC POSTMENOPAUSAL PATIENT: ICD-10-CM

## 2024-02-23 DIAGNOSIS — E78.2 MIXED HYPERLIPIDEMIA: ICD-10-CM

## 2024-02-23 DIAGNOSIS — K21.9 GASTROESOPHAGEAL REFLUX DISEASE WITHOUT ESOPHAGITIS: ICD-10-CM

## 2024-02-23 DIAGNOSIS — G25.0 BENIGN ESSENTIAL TREMOR: ICD-10-CM

## 2024-02-23 PROCEDURE — 99214 OFFICE O/P EST MOD 30 MIN: CPT | Performed by: NURSE PRACTITIONER

## 2024-02-23 RX ORDER — OMEPRAZOLE 40 MG/1
CAPSULE, DELAYED RELEASE ORAL
COMMUNITY
Start: 2024-02-12

## 2024-02-23 NOTE — PROGRESS NOTES
FAMILY PRACTICE OFFICE VISIT       NAME: Siobhan Wilson  AGE: 83 y.o. SEX: female       : 1940        MRN: [id number]    DATE: 2024  TIME: 11:58 AM    Assessment and Plan   1. Essential hypertension  Assessment & Plan:  Stable  Cont meds      Orders:  -     Comprehensive metabolic panel; Future; Expected date: 2024  -     CBC and differential; Future; Expected date: 2024  -     TSH, 3rd generation with Free T4 reflex; Future; Expected date: 2024  -     Lipid Panel with Direct LDL reflex; Future; Expected date: 2024    2. Mixed hyperlipidemia  Assessment & Plan:  Statin intolerant  Low fat diet      Orders:  -     Comprehensive metabolic panel; Future; Expected date: 2024  -     CBC and differential; Future; Expected date: 2024  -     TSH, 3rd generation with Free T4 reflex; Future; Expected date: 2024  -     Lipid Panel with Direct LDL reflex; Future; Expected date: 2024    3. Vitamin D deficiency  Assessment & Plan:  Cont vit d supplement      Orders:  -     Comprehensive metabolic panel; Future; Expected date: 2024  -     CBC and differential; Future; Expected date: 2024  -     TSH, 3rd generation with Free T4 reflex; Future; Expected date: 2024  -     Lipid Panel with Direct LDL reflex; Future; Expected date: 2024    4. Gastroesophageal reflux disease without esophagitis  Assessment & Plan:  Stable  Cont meds        5. Benign essential tremor  Assessment & Plan:  Stable        6. Encounter for osteoporosis screening in asymptomatic postmenopausal patient  -     DXA bone density spine hip and pelvis; Future; Expected date: 2024         There are no Patient Instructions on file for this visit.        Chief Complaint     Chief Complaint   Patient presents with    Follow-up     Pt been seeing for follow up       History of Present Illness   Siobhan Wilson is a 83 y.o.-year-old female who is here for f/u to chronic medical  conditions  Feeling well  Got new car this week  Unable to take statin meds  Labs reviewed  Bp stable  Gerd stable  Taking meds without side affects  Cholesterol elevated   Tremor stable        Review of Systems   Review of Systems   Constitutional:  Negative for fatigue and fever.   HENT:  Negative for congestion, postnasal drip and rhinorrhea.    Eyes:  Negative for photophobia and visual disturbance.   Respiratory:  Negative for cough, shortness of breath and wheezing.    Cardiovascular:  Negative for chest pain and palpitations.   Gastrointestinal:  Negative for constipation, diarrhea, nausea and vomiting.   Genitourinary:  Negative for dysuria and frequency.   Musculoskeletal:  Negative for arthralgias and myalgias.   Skin:  Negative for rash.   Neurological:  Negative for dizziness and headaches.   Hematological:  Negative for adenopathy.   Psychiatric/Behavioral:  Negative for dysphoric mood and sleep disturbance. The patient is not nervous/anxious.        Active Problem List     Patient Active Problem List   Diagnosis    Acid reflux disease    Essential hypertension    Hyperlipidemia    Spondylosis of cervical region without myelopathy or radiculopathy    Thyroid nodule    Vitamin D deficiency    H/O cataract removal with insertion of prosthetic lens    Benign essential tremor    Trigeminal neuropathy    Myogenic ptosis, left    Abnormal CT scan, gallbladder    Burning sensation         Past Medical History:  Past Medical History:   Diagnosis Date    GERD (gastroesophageal reflux disease)     Hyperlipidemia     Hypertension     Pneumonia     Spinal stenosis     Trapezius muscle spasm     last assessed - [id number]       Past Surgical History:  Past Surgical History:   Procedure Laterality Date    BREAST CYST ASPIRATION Right 2010    CATARACT EXTRACTION, BILATERAL      DEBRIDEMENT TENNIS ELBOW      ELBOW SURGERY Right     tennis elbow    FASCIOTOMY ELBOW Right     of the lateral epicondyle; last assessed -  [id number]    HYSTERECTOMY      WIN-BSO    OOPHORECTOMY Bilateral     ND COLONOSCOPY FLX DX W/COLLJ SPEC WHEN PFRMD N/A 05/01/2017    Procedure: EGD AND COLONOSCOPY;  Surgeon: Carl Palafox MD;  Location: [facility];  Service: Gastroenterology       Family History:  Family History   Problem Relation Age of Onset    Cancer Sister     Colon cancer Sister 69    Diabetes Sister     Melanoma Mother     Cirrhosis Father         alcoholic    Stroke Father         CVA (cerebral vascular accident)    Arrhythmia Father         pacemaker placement    No Known Problems Maternal Grandmother     No Known Problems Maternal Grandfather     No Known Problems Paternal Grandmother     No Known Problems Paternal Grandfather     No Known Problems Sister     No Known Problems Sister     No Known Problems Sister     No Known Problems Sister     No Known Problems Maternal Aunt     No Known Problems Maternal Aunt     No Known Problems Maternal Aunt     No Known Problems Maternal Aunt     No Known Problems Maternal Aunt     No Known Problems Maternal Aunt     No Known Problems Paternal Aunt     No Known Problems Paternal Aunt     No Known Problems Paternal Aunt        Social History:  Social History     Socioeconomic History    Marital status: /Civil Union     Spouse name: Not on file    Number of children: 2    Years of education: Not on file    Highest education level: Not on file   Occupational History    Occupation: Retired -  - [employer]   Tobacco Use    Smoking status: Former     Current packs/day: 0.50     Average packs/day: 0.5 packs/day for 62.1 years (31.1 ttl pk-yrs)     Types: Cigarettes     Start date: 1962     Passive exposure: Never    Smokeless tobacco: Former    Tobacco comments:     Quit June 2014 - 1/2 ppd for 40 years per [employer]   Vaping Use    Vaping status: Never Used   Substance and Sexual Activity    Alcohol use: No     Comment: Consumes alcohol occasionally per [employer]    Drug use: No     Sexual activity: Not Currently   Other Topics Concern    Not on file   Social History Narrative    Two children - Henrry 1959 & Sawyer 1962     Social Determinants of Health     Financial Resource Strain: Low Risk  (8/14/2023)    Overall Financial Resource Strain (CARDIA)     Difficulty of Paying Living Expenses: Not hard at all   Food Insecurity: Not on file   Transportation Needs: No Transportation Needs (8/14/2023)    PRAPARE - Transportation     Lack of Transportation (Medical): No     Lack of Transportation (Non-Medical): No   Physical Activity: Not on file   Stress: Not on file   Social Connections: Not on file   Intimate Partner Violence: Not on file   Housing Stability: Not on file       Objective     Vitals:    02/23/24 1045   BP: 136/64   Pulse: 81   Resp: 17   Temp: (!) 97.2 °F (36.2 °C)   SpO2: 99%     Wt Readings from Last 3 Encounters:   02/23/24 56.2 kg (124 lb)   08/14/23 53.1 kg (117 lb)   04/26/23 54.5 kg (120 lb 3.2 oz)       Physical Exam  Vitals and nursing note reviewed.   Constitutional:       Appearance: Normal appearance.   HENT:      Head: Normocephalic and atraumatic.      Right Ear: Tympanic membrane, ear canal and external ear normal.      Left Ear: Tympanic membrane, ear canal and external ear normal.      Nose: Nose normal.      Mouth/Throat:      Mouth: Mucous membranes are moist.   Eyes:      Conjunctiva/sclera: Conjunctivae normal.   Cardiovascular:      Rate and Rhythm: Normal rate and regular rhythm.      Pulses: Normal pulses.      Heart sounds: Normal heart sounds.   Pulmonary:      Effort: Pulmonary effort is normal.      Breath sounds: Normal breath sounds.   Abdominal:      General: Bowel sounds are normal.      Palpations: Abdomen is soft.   Musculoskeletal:         General: Normal range of motion.      Cervical back: Normal range of motion and neck supple.   Skin:     General: Skin is warm and dry.      Capillary Refill: Capillary refill takes less than 2 seconds.  "  Neurological:      General: No focal deficit present.      Mental Status: She is alert and oriented to person, place, and time.   Psychiatric:         Mood and Affect: Mood normal.         Behavior: Behavior normal.         Thought Content: Thought content normal.         Judgment: Judgment normal.         Pertinent Laboratory/Diagnostic Studies:  Lab Results   Component Value Date    GLUCOSE 178 (H) 09/16/2016    BUN 18 02/16/2024    CREATININE 0.74 02/16/2024    CALCIUM 9.8 02/16/2024     10/11/2016    K 4.0 02/16/2024    CO2 28 02/16/2024     02/16/2024     Lab Results   Component Value Date    ALT 10 02/16/2024    AST 16 02/16/2024    ALKPHOS 104 02/16/2024    BILITOT 0.4 10/11/2016       Lab Results   Component Value Date    WBC 5.0 08/09/2023    HGB 14.7 08/09/2023    HCT 43.2 08/09/2023    MCV 95.2 08/09/2023     08/09/2023       No results found for: \"TSH\"    Lab Results   Component Value Date    CHOL 251 (H) 10/11/2016     Lab Results   Component Value Date    TRIG 83 02/16/2024     Lab Results   Component Value Date    HDL 92 02/16/2024     Lab Results   Component Value Date    LDLCALC 138 (H) 02/16/2024     No results found for: \"HGBA1C\"    Results for orders placed or performed in visit on 02/16/24   Lipid Panel with Direct LDL reflex   Result Value Ref Range    Total Cholesterol 248 (H) <200 mg/dL    HDL 92 > OR = 50 mg/dL    Triglycerides 83 <150 mg/dL    LDL Calculated 138 (H) mg/dL (calc)    Chol HDLC Ratio 2.7 <5.0 (calc)    Non-HDL Cholesterol 156 (H) <130 mg/dL (calc)   Comprehensive metabolic panel   Result Value Ref Range    Glucose, Random 88 65 - 99 mg/dL    BUN 18 7 - 25 mg/dL    Creatinine 0.74 0.60 - 0.95 mg/dL    eGFR 81 > OR = 60 mL/min/1.73m2    SL AMB BUN/CREATININE RATIO SEE NOTE: 6 - 22 (calc)    Sodium 142 135 - 146 mmol/L    Potassium 4.0 3.5 - 5.3 mmol/L    Chloride 105 98 - 110 mmol/L    CO2 28 20 - 32 mmol/L    Calcium 9.8 8.6 - 10.4 mg/dL    Protein, Total " 7.0 6.1 - 8.1 g/dL    Albumin 4.4 3.6 - 5.1 g/dL    Globulin 2.6 1.9 - 3.7 g/dL (calc)    Albumin/Globulin Ratio 1.7 1.0 - 2.5 (calc)    TOTAL BILIRUBIN 0.5 0.2 - 1.2 mg/dL    Alkaline Phosphatase 104 37 - 153 U/L    AST 16 10 - 35 U/L    ALT 10 6 - 29 U/L       Orders Placed This Encounter   Procedures    DXA bone density spine hip and pelvis    Comprehensive metabolic panel    CBC and differential    TSH, 3rd generation with Free T4 reflex    Lipid Panel with Direct LDL reflex       ALLERGIES:  Allergies   Allergen Reactions    Atorvastatin Edema    Pneumococcal Vaccines Edema       Current Medications     Current Outpatient Medications   Medication Sig Dispense Refill    amLODIPine (NORVASC) 5 mg tablet TAKE 1 TABLET EVERY DAY 90 tablet 1    Cholecalciferol (VITAMIN D) 2000 UNITS CAPS Take by mouth.      Glycerin-Hypromellose- (DRY EYE RELIEF DROPS OP) Apply to eye      losartan (COZAAR) 100 MG tablet Take 1 tablet (100 mg total) by mouth daily 90 tablet 3    omeprazole (PriLOSEC) 40 MG capsule        No current facility-administered medications for this visit.         Health Maintenance     Health Maintenance   Topic Date Due    SLP PLAN OF CARE  Never done    Osteoporosis Screening  Never done    Zoster Vaccine (2 of 3) 07/26/2017    COVID-19 Vaccine (7 - 2023-24 season) 05/23/2024 (Originally 12/8/2023)    Pneumococcal Vaccine: 65+ Years (2 of 2 - PCV) 07/05/2032 (Originally 10/12/2011)    Fall Risk  08/14/2024    Urinary Incontinence Screening  08/14/2024    Medicare Annual Wellness Visit (AWV)  08/14/2024    Breast Cancer Screening: Mammogram  09/09/2024    Depression Screening  02/23/2025    Hepatitis C Screening  Completed    Influenza Vaccine  Completed    HIB Vaccine  Aged Out    IPV Vaccine  Aged Out    Hepatitis A Vaccine  Aged Out    Meningococcal ACWY Vaccine  Aged Out    HPV Vaccine  Aged Out    Colorectal Cancer Screening  Discontinued     Immunization History   Administered Date(s)  Administered    COVID-19 PFIZER VACCINE 0.3 ML IM 01/21/2021, 02/09/2021, 10/23/2021    COVID-19 Pfizer mRNA vacc PF jonnathan-sucrose 12 yr and older (Comirnaty) 10/13/2023    COVID-19 Pfizer vac (Jonnathan-sucrose, gray cap) 12 yr+ IM 08/23/2022    COVID-19, unspecified 08/23/2022    INFLUENZA 10/25/2016, 10/24/2017, 11/01/2018, 11/24/2018, 10/24/2019, 11/09/2021, 10/24/2022, 10/06/2023    Influenza Split High Dose Preservative Free IM 10/10/2014, 10/13/2015, 10/25/2016, 11/01/2018, 11/07/2019    Influenza, high dose seasonal 0.7 mL 10/24/2019, 10/24/2022    Influenza, recombinant, quadrivalent,injectable, preservative free 11/03/2020    Influenza, seasonal, injectable 10/08/2013    Influenza, seasonal, injectable, preservative free 11/24/2018    Pneumococcal Polysaccharide PPV23 10/12/2010    Zoster 05/31/2017       Depression Screening and Follow-up Plan: Patient was screened for depression during today's encounter. They screened negative with a PHQ-2 score of 0.        IRAM Sevilla

## 2024-05-16 DIAGNOSIS — I10 ESSENTIAL HYPERTENSION: ICD-10-CM

## 2024-05-17 RX ORDER — AMLODIPINE BESYLATE 5 MG/1
5 TABLET ORAL DAILY
Qty: 90 TABLET | Refills: 3 | Status: SHIPPED | OUTPATIENT
Start: 2024-05-17

## 2024-08-02 ENCOUNTER — FOLLOW UP (OUTPATIENT)
Dept: URBAN - METROPOLITAN AREA CLINIC 6 | Facility: CLINIC | Age: 84
End: 2024-08-02

## 2024-08-02 DIAGNOSIS — H35.373: ICD-10-CM

## 2024-08-02 DIAGNOSIS — H35.363: ICD-10-CM

## 2024-08-02 DIAGNOSIS — Z96.1: ICD-10-CM

## 2024-08-02 PROCEDURE — 92012 INTRM OPH EXAM EST PATIENT: CPT

## 2024-08-02 PROCEDURE — 92134 CPTRZ OPH DX IMG PST SGM RTA: CPT

## 2024-08-02 ASSESSMENT — TONOMETRY
OS_IOP_MMHG: 12
OD_IOP_MMHG: 12

## 2024-08-02 ASSESSMENT — VISUAL ACUITY
OS_CC: 20/40+1
OD_CC: 20/40

## 2024-08-08 ENCOUNTER — RA CDI HCC (OUTPATIENT)
Dept: OTHER | Facility: HOSPITAL | Age: 84
End: 2024-08-08

## 2024-08-12 DIAGNOSIS — I10 ESSENTIAL HYPERTENSION: ICD-10-CM

## 2024-08-12 DIAGNOSIS — K21.9 GASTROESOPHAGEAL REFLUX DISEASE WITHOUT ESOPHAGITIS: Primary | ICD-10-CM

## 2024-08-12 RX ORDER — LOSARTAN POTASSIUM 100 MG/1
100 TABLET ORAL DAILY
Qty: 100 TABLET | Refills: 1 | Status: SHIPPED | OUTPATIENT
Start: 2024-08-12

## 2024-08-12 NOTE — TELEPHONE ENCOUNTER
Requested medication(s) are due for refill today: Yes  Patient has already received a courtesy refill: No  Other reason request has been forwarded to provider: please refill medication.

## 2024-08-13 LAB
ALBUMIN SERPL-MCNC: 4.4 G/DL (ref 3.6–5.1)
ALBUMIN/GLOB SERPL: 1.8 (CALC) (ref 1–2.5)
ALP SERPL-CCNC: 92 U/L (ref 37–153)
ALT SERPL-CCNC: 10 U/L (ref 6–29)
AST SERPL-CCNC: 19 U/L (ref 10–35)
BASOPHILS # BLD AUTO: 32 CELLS/UL (ref 0–200)
BASOPHILS NFR BLD AUTO: 0.6 %
BILIRUB SERPL-MCNC: 0.6 MG/DL (ref 0.2–1.2)
BUN SERPL-MCNC: 14 MG/DL (ref 7–25)
BUN/CREAT SERPL: NORMAL (CALC) (ref 6–22)
CALCIUM SERPL-MCNC: 10.1 MG/DL (ref 8.6–10.4)
CHLORIDE SERPL-SCNC: 104 MMOL/L (ref 98–110)
CHOLEST SERPL-MCNC: 226 MG/DL
CHOLEST/HDLC SERPL: 2.8 (CALC)
CO2 SERPL-SCNC: 28 MMOL/L (ref 20–32)
CREAT SERPL-MCNC: 0.71 MG/DL (ref 0.6–0.95)
EOSINOPHIL # BLD AUTO: 48 CELLS/UL (ref 15–500)
EOSINOPHIL NFR BLD AUTO: 0.9 %
ERYTHROCYTE [DISTWIDTH] IN BLOOD BY AUTOMATED COUNT: 12.4 % (ref 11–15)
GFR/BSA.PRED SERPLBLD CYS-BASED-ARV: 85 ML/MIN/1.73M2
GLOBULIN SER CALC-MCNC: 2.5 G/DL (CALC) (ref 1.9–3.7)
GLUCOSE SERPL-MCNC: 95 MG/DL (ref 65–99)
HCT VFR BLD AUTO: 44.3 % (ref 35–45)
HDLC SERPL-MCNC: 80 MG/DL
HGB BLD-MCNC: 15.3 G/DL (ref 11.7–15.5)
LDLC SERPL CALC-MCNC: 128 MG/DL (CALC)
LYMPHOCYTES # BLD AUTO: 1055 CELLS/UL (ref 850–3900)
LYMPHOCYTES NFR BLD AUTO: 19.9 %
MCH RBC QN AUTO: 33 PG (ref 27–33)
MCHC RBC AUTO-ENTMCNC: 34.5 G/DL (ref 32–36)
MCV RBC AUTO: 95.5 FL (ref 80–100)
MONOCYTES # BLD AUTO: 339 CELLS/UL (ref 200–950)
MONOCYTES NFR BLD AUTO: 6.4 %
NEUTROPHILS # BLD AUTO: 3827 CELLS/UL (ref 1500–7800)
NEUTROPHILS NFR BLD AUTO: 72.2 %
NONHDLC SERPL-MCNC: 146 MG/DL (CALC)
PLATELET # BLD AUTO: 315 THOUSAND/UL (ref 140–400)
PMV BLD REES-ECKER: 10.6 FL (ref 7.5–12.5)
POTASSIUM SERPL-SCNC: 4.4 MMOL/L (ref 3.5–5.3)
PROT SERPL-MCNC: 6.9 G/DL (ref 6.1–8.1)
RBC # BLD AUTO: 4.64 MILLION/UL (ref 3.8–5.1)
SODIUM SERPL-SCNC: 139 MMOL/L (ref 135–146)
TRIGL SERPL-MCNC: 85 MG/DL
TSH SERPL-ACNC: 1.33 MIU/L (ref 0.4–4.5)
WBC # BLD AUTO: 5.3 THOUSAND/UL (ref 3.8–10.8)

## 2024-08-13 RX ORDER — OMEPRAZOLE 40 MG/1
40 CAPSULE, DELAYED RELEASE ORAL DAILY
Qty: 90 CAPSULE | Refills: 3 | Status: SHIPPED | OUTPATIENT
Start: 2024-08-13

## 2024-08-15 ENCOUNTER — OFFICE VISIT (OUTPATIENT)
Dept: FAMILY MEDICINE CLINIC | Facility: CLINIC | Age: 84
End: 2024-08-15
Payer: COMMERCIAL

## 2024-08-15 VITALS
HEART RATE: 89 BPM | OXYGEN SATURATION: 97 % | RESPIRATION RATE: 17 BRPM | DIASTOLIC BLOOD PRESSURE: 60 MMHG | HEIGHT: 61 IN | SYSTOLIC BLOOD PRESSURE: 120 MMHG | BODY MASS INDEX: 22.84 KG/M2 | TEMPERATURE: 97.2 F | WEIGHT: 121 LBS

## 2024-08-15 DIAGNOSIS — Z00.00 MEDICARE ANNUAL WELLNESS VISIT, SUBSEQUENT: Primary | ICD-10-CM

## 2024-08-15 DIAGNOSIS — G25.0 BENIGN ESSENTIAL TREMOR: ICD-10-CM

## 2024-08-15 DIAGNOSIS — E78.2 MIXED HYPERLIPIDEMIA: ICD-10-CM

## 2024-08-15 DIAGNOSIS — I10 ESSENTIAL HYPERTENSION: ICD-10-CM

## 2024-08-15 PROCEDURE — G0439 PPPS, SUBSEQ VISIT: HCPCS | Performed by: NURSE PRACTITIONER

## 2024-08-15 NOTE — PROGRESS NOTES
Ambulatory Visit  Name: Siobhan Wilson      : 1940      MRN: [id number]  Encounter Provider: IRAM Sevilla  Encounter Date: 8/15/2024   Encounter department: [facility]    Assessment & Plan   1. Medicare annual wellness visit, subsequent  2. Essential hypertension  -     Comprehensive metabolic panel; Future  -     CBC and differential; Future  -     Lipid Panel with Direct LDL reflex; Future  3. Benign essential tremor  -     Comprehensive metabolic panel; Future  -     CBC and differential; Future  -     Lipid Panel with Direct LDL reflex; Future  4. Mixed hyperlipidemia  -     Comprehensive metabolic panel; Future  -     CBC and differential; Future  -     Lipid Panel with Direct LDL reflex; Future      Depression Screening and Follow-up Plan: Patient was screened for depression during today's encounter. They screened negative with a PHQ-2 score of 0.      Preventive health issues were discussed with patient, and age appropriate screening tests were ordered as noted in patient's After Visit Summary. Personalized health advice and appropriate referrals for health education or preventive services given if needed, as noted in patient's After Visit Summary.    History of Present Illness     Here for medicare wellness  Has living will, will bring in         Patient Care Team:  IRAM Sevilla as PCP - General (Family Medicine)  MD Carl Jiang MD as Endoscopist    Review of Systems   Constitutional:  Negative for fatigue and fever.   HENT:  Negative for congestion, postnasal drip and rhinorrhea.    Eyes:  Negative for photophobia and visual disturbance.   Respiratory:  Negative for cough, shortness of breath and wheezing.    Cardiovascular:  Negative for chest pain and palpitations.   Gastrointestinal:  Negative for constipation, diarrhea, nausea and vomiting.   Genitourinary:  Negative for dysuria and frequency.   Musculoskeletal:  Negative for arthralgias and  myalgias.   Skin:  Negative for rash.   Neurological:  Negative for dizziness, light-headedness and headaches.   Hematological:  Negative for adenopathy.   Psychiatric/Behavioral:  Negative for dysphoric mood and sleep disturbance. The patient is not nervous/anxious.      Medical History Reviewed by provider this encounter:  Tobacco  Allergies  Meds  Problems  Med Hx  Surg Hx  Fam Hx       Annual Wellness Visit Questionnaire   Siobhan is here for her Subsequent Wellness visit. Last Medicare Wellness visit information reviewed, patient interviewed and updates made to the record today.      Health Risk Assessment:   Patient rates overall health as very good. Patient feels that their physical health rating is same. Patient is very satisfied with their life. Eyesight was rated as slightly worse. Hearing was rated as same. Patient feels that their emotional and mental health rating is same. Patients states they are never, rarely angry. Patient states they are never, rarely unusually tired/fatigued. Pain experienced in the last 7 days has been none. Patient states that she has experienced no weight loss or gain in last 6 months.     Depression Screening:   PHQ-2 Score: 0      Fall Risk Screening:   In the past year, patient has experienced: no history of falling in past year      Urinary Incontinence Screening:   Patient has not leaked urine accidently in the last six months.     Home Safety:  Patient does not have trouble with stairs inside or outside of their home. Patient has working smoke alarms and has working carbon monoxide detector. Home safety hazards include: none.     Nutrition:   Current diet is Regular.     Medications:   Patient is currently taking over-the-counter supplements. OTC medications include: see medication list. Patient is able to manage medications.     Activities of Daily Living (ADLs)/Instrumental Activities of Daily Living (IADLs):   Walk and transfer into and out of bed and chair?:  Yes  Dress and groom yourself?: Yes    Bathe or shower yourself?: Yes    Feed yourself? Yes  Do your laundry/housekeeping?: Yes  Manage your money, pay your bills and track your expenses?: Yes  Make your own meals?: Yes    Do your own shopping?: Yes    Previous Hospitalizations:   Any hospitalizations or ED visits within the last 12 months?: No      Advance Care Planning:   Living will: Yes    Durable POA for healthcare: Yes    Advanced directive: Yes    Advanced directive counseling given: Yes    Five wishes given: No    Patient declined ACP directive: No      Cognitive Screening:   Provider or family/friend/caregiver concerned regarding cognition?: No    PREVENTIVE SCREENINGS      Cardiovascular Screening:    General: Screening Not Indicated and History Lipid Disorder      Diabetes Screening:     General: Screening Current      Colorectal Cancer Screening:     General: Screening Not Indicated      Breast Cancer Screening:     General: Screening Current      Cervical Cancer Screening:    General: Screening Not Indicated      Osteoporosis Screening:    General: Screening Not Indicated      Abdominal Aortic Aneurysm (AAA) Screening:        General: Screening Not Indicated      Lung Cancer Screening:     General: Screening Not Indicated      Hepatitis C Screening:    General: Screening Current    Screening, Brief Intervention, and Referral to Treatment (SBIRT)    Screening  Typical number of drinks in a day: 0  Typical number of drinks in a week: 0  Interpretation: Low risk drinking behavior.    Single Item Drug Screening:  How often have you used an illegal drug (including marijuana) or a prescription medication for non-medical reasons in the past year? never    Single Item Drug Screen Score: 0  Interpretation: Negative screen for possible drug use disorder    Brief Intervention  Alcohol & drug use screenings were reviewed. No concerns regarding substance use disorder identified.     Other Counseling Topics:  "  Car/seat belt/driving safety, skin self-exam, sunscreen and calcium and vitamin D intake and regular weightbearing exercise.     Social Determinants of Health     Financial Resource Strain: Low Risk  (8/14/2023)    Overall Financial Resource Strain (CARDIA)     Difficulty of Paying Living Expenses: Not hard at all   Food Insecurity: No Food Insecurity (8/15/2024)    Hunger Vital Sign     Worried About Running Out of Food in the Last Year: Never true     Ran Out of Food in the Last Year: Never true   Transportation Needs: No Transportation Needs (8/15/2024)    PRAPARE - Transportation     Lack of Transportation (Medical): No     Lack of Transportation (Non-Medical): No   Housing Stability: Low Risk  (8/15/2024)    Housing Stability Vital Sign     Unable to Pay for Housing in the Last Year: No     Number of Times Moved in the Last Year: 1     Homeless in the Last Year: No   Utilities: Not At Risk (8/15/2024)    [facility] Utilities     Threatened with loss of utilities: No     No results found.    Objective     /60 (BP Location: Left arm, Patient Position: Sitting, Cuff Size: Standard)   Pulse 89   Temp (!) 97.2 °F (36.2 °C) (Tympanic)   Resp 17   Ht 5' 0.59\" (1.539 m)   Wt 54.9 kg (121 lb)   LMP 07/22/1986 (LMP Unknown) Comment: hysterectomy 30 years ago  SpO2 97%   BMI 23.17 kg/m²     Physical Exam  Vitals and nursing note reviewed.   Constitutional:       Appearance: Normal appearance.   HENT:      Head: Normocephalic and atraumatic.      Right Ear: Tympanic membrane, ear canal and external ear normal.      Left Ear: Tympanic membrane, ear canal and external ear normal.      Nose: Nose normal.      Mouth/Throat:      Mouth: Mucous membranes are moist.   Eyes:      Conjunctiva/sclera: Conjunctivae normal.   Cardiovascular:      Rate and Rhythm: Normal rate and regular rhythm.      Pulses: Normal pulses.      Heart sounds: Normal heart sounds.   Pulmonary:      Effort: Pulmonary effort is normal.      Breath " sounds: Normal breath sounds.   Abdominal:      General: Bowel sounds are normal.      Palpations: Abdomen is soft.   Musculoskeletal:         General: Normal range of motion.      Cervical back: Normal range of motion and neck supple.   Skin:     General: Skin is warm.      Capillary Refill: Capillary refill takes less than 2 seconds.   Neurological:      General: No focal deficit present.      Mental Status: She is alert and oriented to person, place, and time.   Psychiatric:         Mood and Affect: Mood normal.         Behavior: Behavior normal.         Thought Content: Thought content normal.

## 2024-08-15 NOTE — PATIENT INSTRUCTIONS
Medicare Preventive Visit Patient Instructions  Thank you for completing your Welcome to Medicare Visit or Medicare Annual Wellness Visit today. Your next wellness visit will be due in one year (8/16/2025).  The screening/preventive services that you may require over the next 5-10 years are detailed below. Some tests may not apply to you based off risk factors and/or age. Screening tests ordered at today's visit but not completed yet may show as past due. Also, please note that scanned in results may not display below.  Preventive Screenings:  Service Recommendations Previous Testing/Comments   Colorectal Cancer Screening  * Colonoscopy    * Fecal Occult Blood Test (FOBT)/Fecal Immunochemical Test (FIT)  * Fecal DNA/Cologuard Test  * Flexible Sigmoidoscopy Age: 45-75 years old   Colonoscopy: every 10 years (may be performed more frequently if at higher risk)  OR  FOBT/FIT: every 1 year  OR  Cologuard: every 3 years  OR  Sigmoidoscopy: every 5 years  Screening may be recommended earlier than age 45 if at higher risk for colorectal cancer. Also, an individualized decision between you and your healthcare provider will decide whether screening between the ages of 76-85 would be appropriate. Colonoscopy: 05/01/2017  FOBT/FIT: Not on file  Cologuard: Not on file  Sigmoidoscopy: Not on file          Breast Cancer Screening Age: 40+ years old  Frequency: every 1-2 years  Not required if history of left and right mastectomy Mammogram: 09/09/2022    Screening Current   Cervical Cancer Screening Between the ages of 21-29, pap smear recommended once every 3 years.   Between the ages of 30-65, can perform pap smear with HPV co-testing every 5 years.   Recommendations may differ for women with a history of total hysterectomy, cervical cancer, or abnormal pap smears in past. Pap Smear: Not on file    Screening Not Indicated   Hepatitis C Screening Once for adults born between 1945 and 1965  More frequently in patients at high risk  for Hepatitis C Hep C Antibody: 01/23/2019    Screening Current   Diabetes Screening 1-2 times per year if you're at risk for diabetes or have pre-diabetes Fasting glucose: No results in last 5 years (No results in last 5 years)  A1C: No results in last 5 years (No results in last 5 years)  Screening Current   Cholesterol Screening Once every 5 years if you don't have a lipid disorder. May order more often based on risk factors. Lipid panel: 08/13/2024    Screening Not Indicated  History Lipid Disorder     Other Preventive Screenings Covered by Medicare:  Abdominal Aortic Aneurysm (AAA) Screening: covered once if your at risk. You're considered to be at risk if you have a family history of AAA.  Lung Cancer Screening: covers low dose CT scan once per year if you meet all of the following conditions: (1) Age 55-77; (2) No signs or symptoms of lung cancer; (3) Current smoker or have quit smoking within the last 15 years; (4) You have a tobacco smoking history of at least 20 pack years (packs per day multiplied by number of years you smoked); (5) You get a written order from a healthcare provider.  Glaucoma Screening: covered annually if you're considered high risk: (1) You have diabetes OR (2) Family history of glaucoma OR (3)  aged 50 and older OR (4)  American aged 65 and older  Osteoporosis Screening: covered every 2 years if you meet one of the following conditions: (1) You're estrogen deficient and at risk for osteoporosis based off medical history and other findings; (2) Have a vertebral abnormality; (3) On glucocorticoid therapy for more than 3 months; (4) Have primary hyperparathyroidism; (5) On osteoporosis medications and need to assess response to drug therapy.   Last bone density test (DXA Scan): Not on file.  HIV Screening: covered annually if you're between the age of 15-65. Also covered annually if you are younger than 15 and older than 65 with risk factors for HIV infection. For  pregnant patients, it is covered up to 3 times per pregnancy.    Immunizations:  Immunization Recommendations   Influenza Vaccine Annual influenza vaccination during flu season is recommended for all persons aged >= 6 months who do not have contraindications   Pneumococcal Vaccine   * Pneumococcal conjugate vaccine = PCV13 (Prevnar 13), PCV15 (Vaxneuvance), PCV20 (Prevnar 20)  * Pneumococcal polysaccharide vaccine = PPSV23 (Pneumovax) Adults 19-65 yo with certain risk factors or if 65+ yo  If never received any pneumonia vaccine: recommend Prevnar 20 (PCV20)  Give PCV20 if previously received 1 dose of PCV13 or PPSV23   Hepatitis B Vaccine 3 dose series if at intermediate or high risk (ex: diabetes, end stage renal disease, liver disease)   Respiratory syncytial virus (RSV) Vaccine - COVERED BY MEDICARE PART D  * RSVPreF3 (Arexvy) CDC recommends that adults 60 years of age and older may receive a single dose of RSV vaccine using shared clinical decision-making (SCDM)   Tetanus (Td) Vaccine - COST NOT COVERED BY MEDICARE PART B Following completion of primary series, a booster dose should be given every 10 years to maintain immunity against tetanus. Td may also be given as tetanus wound prophylaxis.   Tdap Vaccine - COST NOT COVERED BY MEDICARE PART B Recommended at least once for all adults. For pregnant patients, recommended with each pregnancy.   Shingles Vaccine (Shingrix) - COST NOT COVERED BY MEDICARE PART B  2 shot series recommended in those 19 years and older who have or will have weakened immune systems or those 50 years and older     Health Maintenance Due:      Topic Date Due   • Breast Cancer Screening: Mammogram  09/09/2024   • Hepatitis C Screening  Completed   • Colorectal Cancer Screening  Discontinued     Immunizations Due:      Topic Date Due   • Influenza Vaccine (1) 09/01/2024     Advance Directives   What are advance directives?  Advance directives are legal documents that state your wishes and  plans for medical care. These plans are made ahead of time in case you lose your ability to make decisions for yourself. Advance directives can apply to any medical decision, such as the treatments you want, and if you want to donate organs.   What are the types of advance directives?  There are many types of advance directives, and each state has rules about how to use them. You may choose a combination of any of the following:  Living will:  This is a written record of the treatment you want. You can also choose which treatments you do not want, which to limit, and which to stop at a certain time. This includes surgery, medicine, IV fluid, and tube feedings.   Durable power of  for healthcare (DPAHC):  This is a written record that states who you want to make healthcare choices for you when you are unable to make them for yourself. This person, called a proxy, is usually a family member or a friend. You may choose more than 1 proxy.  Do not resuscitate (DNR) order:  A DNR order is used in case your heart stops beating or you stop breathing. It is a request not to have certain forms of treatment, such as CPR. A DNR order may be included in other types of advance directives.  Medical directive:  This covers the care that you want if you are in a coma, near death, or unable to make decisions for yourself. You can list the treatments you want for each condition. Treatment may include pain medicine, surgery, blood transfusions, dialysis, IV or tube feedings, and a ventilator (breathing machine).  Values history:  This document has questions about your views, beliefs, and how you feel and think about life. This information can help others choose the care that you would choose.  Why are advance directives important?  An advance directive helps you control your care. Although spoken wishes may be used, it is better to have your wishes written down. Spoken wishes can be misunderstood, or not followed. Treatments  may be given even if you do not want them. An advance directive may make it easier for your family to make difficult choices about your care.       © Copyright [employer] 2018 Information is for End User's use only and may not be sold, redistributed or otherwise used for commercial purposes. All illustrations and images included in CareNotes® are the copyrighted property of [employer] or [employer]

## 2024-09-03 ENCOUNTER — OFFICE VISIT (OUTPATIENT)
Dept: FAMILY MEDICINE CLINIC | Facility: CLINIC | Age: 84
End: 2024-09-03
Payer: COMMERCIAL

## 2024-09-03 VITALS
TEMPERATURE: 97.3 F | HEIGHT: 61 IN | HEART RATE: 90 BPM | DIASTOLIC BLOOD PRESSURE: 54 MMHG | WEIGHT: 121.6 LBS | SYSTOLIC BLOOD PRESSURE: 122 MMHG | OXYGEN SATURATION: 96 % | BODY MASS INDEX: 22.96 KG/M2 | RESPIRATION RATE: 17 BRPM

## 2024-09-03 DIAGNOSIS — J06.9 ACUTE UPPER RESPIRATORY INFECTION: Primary | ICD-10-CM

## 2024-09-03 DIAGNOSIS — L03.031 CELLULITIS OF RIGHT TOE: ICD-10-CM

## 2024-09-03 PROCEDURE — G2211 COMPLEX E/M VISIT ADD ON: HCPCS | Performed by: NURSE PRACTITIONER

## 2024-09-03 PROCEDURE — 99213 OFFICE O/P EST LOW 20 MIN: CPT | Performed by: NURSE PRACTITIONER

## 2024-09-03 RX ORDER — DOXYCYCLINE HYCLATE 100 MG
100 TABLET ORAL 2 TIMES DAILY
Qty: 14 TABLET | Refills: 0 | Status: SHIPPED | OUTPATIENT
Start: 2024-09-03 | End: 2024-09-10

## 2024-09-03 RX ORDER — BENZONATATE 200 MG/1
200 CAPSULE ORAL 3 TIMES DAILY PRN
Qty: 30 CAPSULE | Refills: 0 | Status: SHIPPED | OUTPATIENT
Start: 2024-09-03

## 2024-09-03 NOTE — PROGRESS NOTES
FAMILY PRACTICE OFFICE VISIT       NAME: Siobhan Wilson  AGE: 83 y.o. SEX: female       : 1940        MRN: [id number]    DATE: 2024  TIME: 3:48 PM    Assessment and Plan   1. Acute upper respiratory infection  -     doxycycline hyclate (VIBRA-TABS) 100 mg tablet; Take 1 tablet (100 mg total) by mouth 2 (two) times a day for 7 days  -     benzonatate (TESSALON) 200 MG capsule; Take 1 capsule (200 mg total) by mouth 3 (three) times a day as needed for cough  2. Cellulitis of right toe  Assessment & Plan:  Antibiotic as ordered   F/u if not better or worsens         There are no Patient Instructions on file for this visit.        Chief Complaint     Chief Complaint   Patient presents with    Cough     Pt being seeing for cough started 2 weeks ago       History of Present Illness   Siobhan Wilson is a 83 y.o.-year-old female who is here for c/o 2 weeks of illness  Sore throat and cough  Mucous in throat  Hoarse voice  Pnd and congestion  No ha or fever  No ear pain  Used otc meds with no relief  Redness to right toe    Review of Systems   Review of Systems   Constitutional:  Positive for fatigue. Negative for chills and fever.   HENT:  Positive for congestion, postnasal drip, rhinorrhea, sore throat and voice change. Negative for ear pain, sinus pressure and sinus pain.    Respiratory:  Positive for cough. Negative for chest tightness, shortness of breath and wheezing.    Cardiovascular:  Negative for chest pain and palpitations.   Gastrointestinal:  Negative for constipation, diarrhea, nausea and vomiting.   Genitourinary:  Negative for dysuria and frequency.   Musculoskeletal:  Negative for arthralgias and myalgias.   Skin:  Positive for color change. Negative for wound.   Neurological:  Negative for dizziness, light-headedness and headaches.   Hematological:  Negative for adenopathy.   Psychiatric/Behavioral:  Negative for dysphoric mood and sleep disturbance. The patient is not nervous/anxious.         Active Problem List     Patient Active Problem List   Diagnosis    Acid reflux disease    Essential hypertension    Hyperlipidemia    Spondylosis of cervical region without myelopathy or radiculopathy    Thyroid nodule    Vitamin D deficiency    H/O cataract removal with insertion of prosthetic lens    Benign essential tremor    Trigeminal neuropathy    Myogenic ptosis, left    Abnormal CT scan, gallbladder    Burning sensation    Cellulitis of right toe         Past Medical History:  Past Medical History:   Diagnosis Date    GERD (gastroesophageal reflux disease)     Hyperlipidemia     Hypertension     Pneumonia     Spinal stenosis     Trapezius muscle spasm     last assessed - [id number]       Past Surgical History:  Past Surgical History:   Procedure Laterality Date    BREAST CYST ASPIRATION Right 2010    CATARACT EXTRACTION, BILATERAL      DEBRIDEMENT TENNIS ELBOW      ELBOW SURGERY Right     tennis elbow    FASCIOTOMY ELBOW Right     of the lateral epicondyle; last assessed - 10Oct2014    HYSTERECTOMY      WIN-BSO    OOPHORECTOMY Bilateral     PA COLONOSCOPY FLX DX W/COLLJ SPEC WHEN PFRMD N/A 05/01/2017    Procedure: EGD AND COLONOSCOPY;  Surgeon: Carl Palafox MD;  Location: [facility];  Service: Gastroenterology       Family History:  Family History   Problem Relation Age of Onset    Cancer Sister     Colon cancer Sister 69    Diabetes Sister     Melanoma Mother     Cirrhosis Father         alcoholic    Stroke Father         CVA (cerebral vascular accident)    Arrhythmia Father         pacemaker placement    No Known Problems Maternal Grandmother     No Known Problems Maternal Grandfather     No Known Problems Paternal Grandmother     No Known Problems Paternal Grandfather     No Known Problems Sister     No Known Problems Sister     No Known Problems Sister     No Known Problems Sister     No Known Problems Maternal Aunt     No Known Problems Maternal Aunt     No Known Problems Maternal Aunt     No  Known Problems Maternal Aunt     No Known Problems Maternal Aunt     No Known Problems Maternal Aunt     No Known Problems Paternal Aunt     No Known Problems Paternal Aunt     No Known Problems Paternal Aunt        Social History:  Social History     Socioeconomic History    Marital status: /Civil Union     Spouse name: Not on file    Number of children: 2    Years of education: Not on file    Highest education level: Not on file   Occupational History    Occupation: Retired -  - [employer]   Tobacco Use    Smoking status: Former     Current packs/day: 0.50     Average packs/day: 0.5 packs/day for 62.7 years (31.3 ttl pk-yrs)     Types: Cigarettes     Start date: 1962     Passive exposure: Never    Smokeless tobacco: Former    Tobacco comments:     Quit June 2014 - 1/2 ppd for 40 years per [employer]   Vaping Use    Vaping status: Never Used   Substance and Sexual Activity    Alcohol use: No     Comment: Consumes alcohol occasionally per [employer]    Drug use: No    Sexual activity: Not Currently   Other Topics Concern    Not on file   Social History Narrative    Two children - Henrry 1959 & Sawyer 1962     Social Determinants of Health     Financial Resource Strain: Low Risk  (8/14/2023)    Overall Financial Resource Strain (CARDIA)     Difficulty of Paying Living Expenses: Not hard at all   Food Insecurity: No Food Insecurity (8/15/2024)    Hunger Vital Sign     Worried About Running Out of Food in the Last Year: Never true     Ran Out of Food in the Last Year: Never true   Transportation Needs: No Transportation Needs (8/15/2024)    PRAPARE - Transportation     Lack of Transportation (Medical): No     Lack of Transportation (Non-Medical): No   Physical Activity: Not on file   Stress: Not on file   Social Connections: Not on file   Intimate Partner Violence: Not on file   Housing Stability: Low Risk  (8/15/2024)    Housing Stability Vital Sign     Unable to Pay for Housing in the Last Year: No      Number of Times Moved in the Last Year: 1     Homeless in the Last Year: No       Objective     Vitals:    09/03/24 1321   BP: 122/54   Pulse: 90   Resp: 17   Temp: (!) 97.3 °F (36.3 °C)   SpO2: 96%     Wt Readings from Last 3 Encounters:   09/03/24 55.2 kg (121 lb 9.6 oz)   08/15/24 54.9 kg (121 lb)   02/23/24 56.2 kg (124 lb)       Physical Exam  Vitals and nursing note reviewed.   Constitutional:       Appearance: Normal appearance.   HENT:      Head: Normocephalic and atraumatic.      Right Ear: Tympanic membrane, ear canal and external ear normal.      Left Ear: Tympanic membrane, ear canal and external ear normal.      Nose: Rhinorrhea present.      Mouth/Throat:      Pharynx: Posterior oropharyngeal erythema present.   Eyes:      Conjunctiva/sclera: Conjunctivae normal.   Cardiovascular:      Rate and Rhythm: Normal rate and regular rhythm.      Heart sounds: Normal heart sounds.   Pulmonary:      Effort: Pulmonary effort is normal.      Breath sounds: Normal breath sounds.   Abdominal:      General: Bowel sounds are normal.   Musculoskeletal:         General: Normal range of motion.      Cervical back: Normal range of motion and neck supple.   Skin:     General: Skin is warm and dry.      Capillary Refill: Capillary refill takes less than 2 seconds.      Findings: Erythema present.      Comments: Right great tow erythema     Neurological:      Mental Status: She is alert and oriented to person, place, and time.   Psychiatric:         Mood and Affect: Mood normal.         Behavior: Behavior normal.         Thought Content: Thought content normal.         Judgment: Judgment normal.         Pertinent Laboratory/Diagnostic Studies:  Lab Results   Component Value Date    GLUCOSE 178 (H) 09/16/2016    BUN 14 08/13/2024    CREATININE 0.71 08/13/2024    CALCIUM 10.1 08/13/2024     10/11/2016    K 4.4 08/13/2024    CO2 28 08/13/2024     08/13/2024     Lab Results   Component Value Date    ALT 10  "08/13/2024    AST 19 08/13/2024    ALKPHOS 92 08/13/2024    BILITOT 0.4 10/11/2016       Lab Results   Component Value Date    WBC 5.3 08/13/2024    HGB 15.3 08/13/2024    HCT 44.3 08/13/2024    MCV 95.5 08/13/2024     08/13/2024       No results found for: \"TSH\"    Lab Results   Component Value Date    CHOL 251 (H) 10/11/2016     Lab Results   Component Value Date    TRIG 85 08/13/2024     Lab Results   Component Value Date    HDL 80 08/13/2024     Lab Results   Component Value Date    LDLCALC 128 (H) 08/13/2024     No results found for: \"HGBA1C\"    Results for orders placed or performed in visit on 08/13/24   Lipid Panel with Direct LDL reflex   Result Value Ref Range    Total Cholesterol 226 (H) <200 mg/dL    HDL 80 > OR = 50 mg/dL    Triglycerides 85 <150 mg/dL    LDL Calculated 128 (H) mg/dL (calc)    Chol HDLC Ratio 2.8 <5.0 (calc)    Non-HDL Cholesterol 146 (H) <130 mg/dL (calc)   Comprehensive metabolic panel   Result Value Ref Range    Glucose, Random 95 65 - 99 mg/dL    BUN 14 7 - 25 mg/dL    Creatinine 0.71 0.60 - 0.95 mg/dL    eGFR 85 > OR = 60 mL/min/1.73m2    SL AMB BUN/CREATININE RATIO SEE NOTE: 6 - 22 (calc)    Sodium 139 135 - 146 mmol/L    Potassium 4.4 3.5 - 5.3 mmol/L    Chloride 104 98 - 110 mmol/L    CO2 28 20 - 32 mmol/L    Calcium 10.1 8.6 - 10.4 mg/dL    Protein, Total 6.9 6.1 - 8.1 g/dL    Albumin 4.4 3.6 - 5.1 g/dL    Globulin 2.5 1.9 - 3.7 g/dL (calc)    Albumin/Globulin Ratio 1.8 1.0 - 2.5 (calc)    TOTAL BILIRUBIN 0.6 0.2 - 1.2 mg/dL    Alkaline Phosphatase 92 37 - 153 U/L    AST 19 10 - 35 U/L    ALT 10 6 - 29 U/L   CBC and differential   Result Value Ref Range    White Blood Cell Count 5.3 3.8 - 10.8 Thousand/uL    Red Blood Cell Count 4.64 3.80 - 5.10 Million/uL    Hemoglobin 15.3 11.7 - 15.5 g/dL    HCT 44.3 35.0 - 45.0 %    MCV 95.5 80.0 - 100.0 fL    MCH 33.0 27.0 - 33.0 pg    MCHC 34.5 32.0 - 36.0 g/dL    RDW 12.4 11.0 - 15.0 %    Platelet Count 315 140 - 400 Thousand/uL "    SL AMB MPV 10.6 7.5 - 12.5 fL    Neutrophils (Absolute) 3,827 1,500 - 7,800 cells/uL    Lymphocytes (Absolute) 1,055 850 - 3,900 cells/uL    Monocytes (Absolute) 339 200 - 950 cells/uL    Eosinophils (Absolute) 48 15 - 500 cells/uL    Basophils ABS 32 0 - 200 cells/uL    Neutrophils 72.2 %    Lymphocytes 19.9 %    Monocytes 6.4 %    Eosinophils 0.9 %    Basophils PCT 0.6 %   TSH, 3rd generation with Free T4 reflex   Result Value Ref Range    TSH W/RFX TO FREE T4 1.33 0.40 - 4.50 mIU/L       No orders of the defined types were placed in this encounter.      ALLERGIES:  Allergies   Allergen Reactions    Atorvastatin Edema    Pneumococcal Vaccines Edema       Current Medications     Current Outpatient Medications   Medication Sig Dispense Refill    amLODIPine (NORVASC) 5 mg tablet TAKE 1 TABLET EVERY DAY 90 tablet 3    benzonatate (TESSALON) 200 MG capsule Take 1 capsule (200 mg total) by mouth 3 (three) times a day as needed for cough 30 capsule 0    Cholecalciferol (VITAMIN D) 2000 UNITS CAPS Take by mouth.      doxycycline hyclate (VIBRA-TABS) 100 mg tablet Take 1 tablet (100 mg total) by mouth 2 (two) times a day for 7 days 14 tablet 0    Glycerin-Hypromellose- (DRY EYE RELIEF DROPS OP) Apply to eye      losartan (COZAAR) 100 MG tablet TAKE 1 TABLET EVERY  tablet 1    omeprazole (PriLOSEC) 40 MG capsule TAKE 1 CAPSULE EVERY DAY 90 capsule 3     No current facility-administered medications for this visit.         Health Maintenance     Health Maintenance   Topic Date Due    SLP PLAN OF CARE  Never done    Osteoporosis Screening  Never done    RSV Vaccine Age 60+ Years (1 - 1-dose 60+ series) Never done    Zoster Vaccine (2 of 3) 07/26/2017    Influenza Vaccine (1) 09/01/2024    Breast Cancer Screening: Mammogram  09/09/2024    Pneumococcal Vaccine: 65+ Years (2 of 2 - PCV) 07/05/2032 (Originally 10/12/2011)    Fall Risk  08/15/2025    Depression Screening  08/15/2025    Urinary Incontinence  Screening  08/15/2025    Medicare Annual Wellness Visit (AWV)  08/15/2025    Hepatitis C Screening  Completed    COVID-19 Vaccine  Completed    RSV Vaccine age 0-20 Months  Aged Out    HIB Vaccine  Aged Out    IPV Vaccine  Aged Out    Hepatitis A Vaccine  Aged Out    Meningococcal ACWY Vaccine  Aged Out    HPV Vaccine  Aged Out    Colorectal Cancer Screening  Discontinued     Immunization History   Administered Date(s) Administered    COVID-19 PFIZER VACCINE 0.3 ML IM 01/21/2021, 02/09/2021, 10/23/2021    COVID-19 Pfizer mRNA vacc PF jonnathan-sucrose 12 yr and older (Comirnaty) 10/13/2023    COVID-19 Pfizer vac (Jonnathan-sucrose, gray cap) 12 yr+ IM 08/23/2022    COVID-19, unspecified 08/23/2022    INFLUENZA 10/25/2016, 10/24/2017, 11/01/2018, 11/24/2018, 10/24/2019, 11/09/2021, 10/24/2022, 10/06/2023    Influenza Split High Dose Preservative Free IM 10/10/2014, 10/13/2015, 10/25/2016, 11/01/2018, 11/07/2019    Influenza, high dose seasonal 0.7 mL 10/24/2019, 10/24/2022    Influenza, recombinant, quadrivalent,injectable, preservative free 11/03/2020    Influenza, seasonal, injectable 10/08/2013    Influenza, seasonal, injectable, preservative free 11/24/2018    Pneumococcal Polysaccharide PPV23 10/12/2010    Zoster 05/31/2017          IRAM Sevilla

## 2024-09-07 PROBLEM — L02.611 CELLULITIS AND ABSCESS OF TOE OF RIGHT FOOT: Status: ACTIVE | Noted: 2024-09-07

## 2024-09-07 PROBLEM — L03.031 CELLULITIS AND ABSCESS OF TOE OF RIGHT FOOT: Status: ACTIVE | Noted: 2024-09-07

## 2025-02-11 DIAGNOSIS — I10 ESSENTIAL HYPERTENSION: ICD-10-CM

## 2025-02-11 RX ORDER — LOSARTAN POTASSIUM 100 MG/1
100 TABLET ORAL DAILY
Qty: 90 TABLET | Refills: 3 | Status: SHIPPED | OUTPATIENT
Start: 2025-02-11

## 2025-02-25 LAB
ALBUMIN SERPL-MCNC: 4.3 G/DL (ref 3.6–5.1)
ALBUMIN/GLOB SERPL: 1.6 (CALC) (ref 1–2.5)
ALP SERPL-CCNC: 122 U/L (ref 37–153)
ALT SERPL-CCNC: 7 U/L (ref 6–29)
AST SERPL-CCNC: 13 U/L (ref 10–35)
BASOPHILS # BLD AUTO: 17 CELLS/UL (ref 0–200)
BASOPHILS NFR BLD AUTO: 0.3 %
BILIRUB SERPL-MCNC: 0.5 MG/DL (ref 0.2–1.2)
BUN SERPL-MCNC: 18 MG/DL (ref 7–25)
BUN/CREAT SERPL: NORMAL (CALC) (ref 6–22)
CALCIUM SERPL-MCNC: 10 MG/DL (ref 8.6–10.4)
CHLORIDE SERPL-SCNC: 107 MMOL/L (ref 98–110)
CHOLEST SERPL-MCNC: 215 MG/DL
CHOLEST/HDLC SERPL: 2.7 (CALC)
CO2 SERPL-SCNC: 30 MMOL/L (ref 20–32)
CREAT SERPL-MCNC: 0.73 MG/DL (ref 0.6–0.95)
EOSINOPHIL # BLD AUTO: 70 CELLS/UL (ref 15–500)
EOSINOPHIL NFR BLD AUTO: 1.2 %
ERYTHROCYTE [DISTWIDTH] IN BLOOD BY AUTOMATED COUNT: 13 % (ref 11–15)
GFR/BSA.PRED SERPLBLD CYS-BASED-ARV: 82 ML/MIN/1.73M2
GLOBULIN SER CALC-MCNC: 2.7 G/DL (CALC) (ref 1.9–3.7)
GLUCOSE SERPL-MCNC: 94 MG/DL (ref 65–99)
HCT VFR BLD AUTO: 46 % (ref 35–45)
HDLC SERPL-MCNC: 79 MG/DL
HGB BLD-MCNC: 14.8 G/DL (ref 11.7–15.5)
LDLC SERPL CALC-MCNC: 120 MG/DL (CALC)
LYMPHOCYTES # BLD AUTO: 858 CELLS/UL (ref 850–3900)
LYMPHOCYTES NFR BLD AUTO: 14.8 %
MCH RBC QN AUTO: 31.4 PG (ref 27–33)
MCHC RBC AUTO-ENTMCNC: 32.2 G/DL (ref 32–36)
MCV RBC AUTO: 97.5 FL (ref 80–100)
MONOCYTES # BLD AUTO: 284 CELLS/UL (ref 200–950)
MONOCYTES NFR BLD AUTO: 4.9 %
NEUTROPHILS # BLD AUTO: 4570 CELLS/UL (ref 1500–7800)
NEUTROPHILS NFR BLD AUTO: 78.8 %
NONHDLC SERPL-MCNC: 136 MG/DL (CALC)
PLATELET # BLD AUTO: 392 THOUSAND/UL (ref 140–400)
PMV BLD REES-ECKER: 10.6 FL (ref 7.5–12.5)
POTASSIUM SERPL-SCNC: 4.3 MMOL/L (ref 3.5–5.3)
PROT SERPL-MCNC: 7 G/DL (ref 6.1–8.1)
RBC # BLD AUTO: 4.72 MILLION/UL (ref 3.8–5.1)
SODIUM SERPL-SCNC: 143 MMOL/L (ref 135–146)
TRIGL SERPL-MCNC: 69 MG/DL
WBC # BLD AUTO: 5.8 THOUSAND/UL (ref 3.8–10.8)

## 2025-02-27 ENCOUNTER — OFFICE VISIT (OUTPATIENT)
Dept: FAMILY MEDICINE CLINIC | Facility: CLINIC | Age: 85
End: 2025-02-27
Payer: COMMERCIAL

## 2025-02-27 VITALS
HEIGHT: 61 IN | HEART RATE: 87 BPM | SYSTOLIC BLOOD PRESSURE: 122 MMHG | BODY MASS INDEX: 22.84 KG/M2 | RESPIRATION RATE: 18 BRPM | WEIGHT: 121 LBS | OXYGEN SATURATION: 99 % | DIASTOLIC BLOOD PRESSURE: 60 MMHG | TEMPERATURE: 96.9 F

## 2025-02-27 DIAGNOSIS — Z78.0 ENCOUNTER FOR OSTEOPOROSIS SCREENING IN ASYMPTOMATIC POSTMENOPAUSAL PATIENT: ICD-10-CM

## 2025-02-27 DIAGNOSIS — Z13.820 ENCOUNTER FOR OSTEOPOROSIS SCREENING IN ASYMPTOMATIC POSTMENOPAUSAL PATIENT: ICD-10-CM

## 2025-02-27 DIAGNOSIS — K21.9 GASTROESOPHAGEAL REFLUX DISEASE WITHOUT ESOPHAGITIS: ICD-10-CM

## 2025-02-27 DIAGNOSIS — I10 ESSENTIAL HYPERTENSION: Primary | ICD-10-CM

## 2025-02-27 DIAGNOSIS — E78.2 MIXED HYPERLIPIDEMIA: ICD-10-CM

## 2025-02-27 PROBLEM — R20.8 BURNING SENSATION: Status: RESOLVED | Noted: 2023-03-03 | Resolved: 2025-02-27

## 2025-02-27 PROBLEM — L03.031 CELLULITIS OF RIGHT TOE: Status: RESOLVED | Noted: 2024-09-07 | Resolved: 2025-02-27

## 2025-02-27 PROCEDURE — G2211 COMPLEX E/M VISIT ADD ON: HCPCS | Performed by: NURSE PRACTITIONER

## 2025-02-27 PROCEDURE — 99214 OFFICE O/P EST MOD 30 MIN: CPT | Performed by: NURSE PRACTITIONER

## 2025-02-27 RX ORDER — AMLODIPINE BESYLATE 5 MG/1
5 TABLET ORAL DAILY
Qty: 90 TABLET | Refills: 3 | Status: SHIPPED | OUTPATIENT
Start: 2025-02-27

## 2025-02-27 RX ORDER — OMEPRAZOLE 40 MG/1
40 CAPSULE, DELAYED RELEASE ORAL DAILY
Qty: 90 CAPSULE | Refills: 3 | Status: SHIPPED | OUTPATIENT
Start: 2025-02-27

## 2025-02-27 RX ORDER — LOSARTAN POTASSIUM 100 MG/1
100 TABLET ORAL DAILY
Qty: 90 TABLET | Refills: 3 | Status: SHIPPED | OUTPATIENT
Start: 2025-02-27

## 2025-02-27 NOTE — ASSESSMENT & PLAN NOTE
Stable  Cont meds    Orders:    omeprazole (PriLOSEC) 40 MG capsule; Take 1 capsule (40 mg total) by mouth daily    Comprehensive metabolic panel; Future    CBC and differential; Future    Lipid Panel with Direct LDL reflex; Future

## 2025-02-27 NOTE — PROGRESS NOTES
Name: Siobhan Wilson      : 1940      MRN: [id number]  Encounter Provider: IRAM Sevilla  Encounter Date: 2025   Encounter department: [facility]    Assessment & Plan  Essential hypertension  Stable  Cont meds    Orders:    amLODIPine (NORVASC) 5 mg tablet; Take 1 tablet (5 mg total) by mouth daily    losartan (COZAAR) 100 MG tablet; Take 1 tablet (100 mg total) by mouth daily    Comprehensive metabolic panel; Future    CBC and differential; Future    Lipid Panel with Direct LDL reflex; Future    Gastroesophageal reflux disease without esophagitis  Stable  Cont meds    Orders:    omeprazole (PriLOSEC) 40 MG capsule; Take 1 capsule (40 mg total) by mouth daily    Comprehensive metabolic panel; Future    CBC and differential; Future    Lipid Panel with Direct LDL reflex; Future    Mixed hyperlipidemia  Declines statin           Encounter for osteoporosis screening in asymptomatic postmenopausal patient    Orders:    DXA bone density spine hip and pelvis; Future      Depression Screening and Follow-up Plan: Patient was screened for depression during today's encounter. They screened negative with a PHQ-2 score of 0.      Tobacco Cessation Counseling: Tobacco cessation counseling was provided. The patient is sincerely urged to quit consumption of tobacco. She is not ready to quit tobacco. Medication options and side effects of medication discussed. Patient refused medication.         History of Present Illness     Here for follow up  Feeling well  Labs reviewed   Stable  Declines statin\  Declines mammogram   Declines vaccines  Tolerating meds without side affects          Review of Systems   Constitutional:  Negative for fatigue and fever.   HENT:  Negative for congestion, postnasal drip and rhinorrhea.    Eyes:  Negative for photophobia and visual disturbance.   Respiratory:  Negative for cough, shortness of breath and wheezing.    Cardiovascular:  Negative for chest pain and  palpitations.   Gastrointestinal:  Negative for constipation, diarrhea, nausea and vomiting.   Genitourinary:  Negative for dysuria and frequency.   Musculoskeletal:  Negative for arthralgias and myalgias.   Skin:  Negative for rash.   Neurological:  Negative for dizziness, light-headedness and headaches.   Hematological:  Negative for adenopathy.   Psychiatric/Behavioral:  Negative for dysphoric mood and sleep disturbance. The patient is not nervous/anxious.      Past Medical History:   Diagnosis Date    GERD (gastroesophageal reflux disease)     Hyperlipidemia     Hypertension     Pneumonia     Spinal stenosis     Trapezius muscle spasm     last assessed - [id number]     Past Surgical History:   Procedure Laterality Date    BREAST CYST ASPIRATION Right 2010    CATARACT EXTRACTION, BILATERAL      DEBRIDEMENT TENNIS ELBOW      ELBOW SURGERY Right     tennis elbow    FASCIOTOMY ELBOW Right     of the lateral epicondyle; last assessed - 10Oct2014    HYSTERECTOMY      WIN-BSO    OOPHORECTOMY Bilateral     PA COLONOSCOPY FLX DX W/COLLJ SPEC WHEN PFRMD N/A 05/01/2017    Procedure: EGD AND COLONOSCOPY;  Surgeon: Carl Palafox MD;  Location: [facility];  Service: Gastroenterology     Family History   Problem Relation Age of Onset    Cancer Sister     Colon cancer Sister 69    Diabetes Sister     Melanoma Mother     Cirrhosis Father         alcoholic    Stroke Father         CVA (cerebral vascular accident)    Arrhythmia Father         pacemaker placement    No Known Problems Maternal Grandmother     No Known Problems Maternal Grandfather     No Known Problems Paternal Grandmother     No Known Problems Paternal Grandfather     No Known Problems Sister     No Known Problems Sister     No Known Problems Sister     No Known Problems Sister     No Known Problems Maternal Aunt     No Known Problems Maternal Aunt     No Known Problems Maternal Aunt     No Known Problems Maternal Aunt     No Known Problems Maternal Aunt     No  Known Problems Maternal Aunt     No Known Problems Paternal Aunt     No Known Problems Paternal Aunt     No Known Problems Paternal Aunt      Social History     Tobacco Use    Smoking status: Every Day     Current packs/day: 0.50     Average packs/day: 0.5 packs/day for 63.2 years (31.6 ttl pk-yrs)     Types: Cigarettes     Start date: 1962     Passive exposure: Never    Smokeless tobacco: Former    Tobacco comments:     Quit June 2014 - 1/2 ppd for 40 years per [employer]   Vaping Use    Vaping status: Every Day   Substance and Sexual Activity    Alcohol use: No     Comment: Consumes alcohol occasionally per [employer]    Drug use: No    Sexual activity: Not Currently     Current Outpatient Medications on File Prior to Visit   Medication Sig    Cholecalciferol (VITAMIN D) 2000 UNITS CAPS Take by mouth.    Glycerin-Hypromellose- (DRY EYE RELIEF DROPS OP) Apply to eye    [DISCONTINUED] amLODIPine (NORVASC) 5 mg tablet TAKE 1 TABLET EVERY DAY    [DISCONTINUED] losartan (COZAAR) 100 MG tablet TAKE 1 TABLET EVERY DAY    [DISCONTINUED] omeprazole (PriLOSEC) 40 MG capsule TAKE 1 CAPSULE EVERY DAY    [DISCONTINUED] benzonatate (TESSALON) 200 MG capsule Take 1 capsule (200 mg total) by mouth 3 (three) times a day as needed for cough (Patient not taking: Reported on 2/27/2025)     Allergies   Allergen Reactions    Atorvastatin Edema    Pneumococcal Vaccines Edema     Immunization History   Administered Date(s) Administered    COVID-19 PFIZER VACCINE 0.3 ML IM 01/21/2021, 02/09/2021, 10/23/2021    COVID-19 Pfizer mRNA vacc PF jonnathan-sucrose 12 yr and older (Comirnaty) 10/13/2023    COVID-19 Pfizer vac (Jonnathan-sucrose, gray cap) 12 yr+ IM 08/23/2022    COVID-19, unspecified 08/23/2022    INFLUENZA 10/25/2016, 10/24/2017, 11/01/2018, 11/24/2018, 10/24/2019, 11/09/2021, 10/24/2022, 10/06/2023    Influenza Split High Dose Preservative Free IM 10/10/2014, 10/13/2015, 10/25/2016, 11/01/2018, 11/07/2019    Influenza, high dose  "seasonal 0.7 mL 10/24/2019, 10/24/2022, 10/01/2024    Influenza, recombinant, quadrivalent,injectable, preservative free 11/03/2020    Influenza, seasonal, injectable 10/08/2013    Influenza, seasonal, injectable, preservative free 11/24/2018    Pneumococcal Polysaccharide PPV23 10/12/2010    Zoster 05/31/2017     Objective   /60 (BP Location: Left arm, Patient Position: Sitting, Cuff Size: Standard)   Pulse 87   Temp (!) 96.9 °F (36.1 °C) (Tympanic)   Resp 18   Ht 5' 0.59\" (1.539 m)   Wt 54.9 kg (121 lb)   LMP 07/22/1986 (LMP Unknown) Comment: hysterectomy 30 years ago  SpO2 99%   BMI 23.17 kg/m²     Physical Exam  Vitals and nursing note reviewed.   Constitutional:       Appearance: Normal appearance.   HENT:      Head: Normocephalic and atraumatic.      Right Ear: Tympanic membrane, ear canal and external ear normal.      Left Ear: Tympanic membrane, ear canal and external ear normal.      Nose: Nose normal.      Mouth/Throat:      Mouth: Mucous membranes are moist.   Eyes:      General:         Right eye: No discharge.      Conjunctiva/sclera: Conjunctivae normal.   Cardiovascular:      Rate and Rhythm: Normal rate and regular rhythm.      Heart sounds: Normal heart sounds.   Pulmonary:      Effort: Pulmonary effort is normal.      Breath sounds: Normal breath sounds.   Abdominal:      General: Bowel sounds are normal.      Palpations: Abdomen is soft.   Musculoskeletal:         General: Normal range of motion.      Cervical back: Normal range of motion and neck supple.   Skin:     General: Skin is warm and dry.      Capillary Refill: Capillary refill takes less than 2 seconds.   Neurological:      General: No focal deficit present.      Mental Status: She is alert and oriented to person, place, and time.   Psychiatric:         Mood and Affect: Mood normal.         Behavior: Behavior normal.         Thought Content: Thought content normal.         Judgment: Judgment normal.         "

## 2025-02-27 NOTE — ASSESSMENT & PLAN NOTE
Stable  Cont meds    Orders:    amLODIPine (NORVASC) 5 mg tablet; Take 1 tablet (5 mg total) by mouth daily    losartan (COZAAR) 100 MG tablet; Take 1 tablet (100 mg total) by mouth daily    Comprehensive metabolic panel; Future    CBC and differential; Future    Lipid Panel with Direct LDL reflex; Future

## (undated) RX ORDER — BROMFENAC SODIUM 0.7 MG/ML: 1 SOLUTION/ DROPS OPHTHALMIC